# Patient Record
Sex: FEMALE | Race: OTHER | HISPANIC OR LATINO | URBAN - METROPOLITAN AREA
[De-identification: names, ages, dates, MRNs, and addresses within clinical notes are randomized per-mention and may not be internally consistent; named-entity substitution may affect disease eponyms.]

---

## 2017-07-19 ENCOUNTER — INPATIENT (INPATIENT)
Facility: HOSPITAL | Age: 77
LOS: 4 days | Discharge: HOME HEALTH | End: 2017-07-24
Attending: HOSPITALIST | Admitting: HOSPITALIST
Payer: MEDICAID

## 2017-07-19 VITALS
TEMPERATURE: 100 F | RESPIRATION RATE: 16 BRPM | DIASTOLIC BLOOD PRESSURE: 100 MMHG | OXYGEN SATURATION: 100 % | SYSTOLIC BLOOD PRESSURE: 192 MMHG | HEART RATE: 98 BPM

## 2017-07-19 LAB
ALBUMIN SERPL ELPH-MCNC: 3.7 G/DL — SIGNIFICANT CHANGE UP (ref 3.3–5)
ALP SERPL-CCNC: 64 U/L — SIGNIFICANT CHANGE UP (ref 40–120)
ALT FLD-CCNC: 14 U/L — SIGNIFICANT CHANGE UP (ref 4–33)
APPEARANCE UR: CLEAR — SIGNIFICANT CHANGE UP
AST SERPL-CCNC: 27 U/L — SIGNIFICANT CHANGE UP (ref 4–32)
BASOPHILS # BLD AUTO: 0.02 K/UL — SIGNIFICANT CHANGE UP (ref 0–0.2)
BASOPHILS NFR BLD AUTO: 0.2 % — SIGNIFICANT CHANGE UP (ref 0–2)
BILIRUB SERPL-MCNC: 0.2 MG/DL — SIGNIFICANT CHANGE UP (ref 0.2–1.2)
BILIRUB UR-MCNC: NEGATIVE — SIGNIFICANT CHANGE UP
BLOOD UR QL VISUAL: NEGATIVE — SIGNIFICANT CHANGE UP
BUN SERPL-MCNC: 44 MG/DL — HIGH (ref 7–23)
CALCIUM SERPL-MCNC: 8.4 MG/DL — SIGNIFICANT CHANGE UP (ref 8.4–10.5)
CHLORIDE SERPL-SCNC: 90 MMOL/L — LOW (ref 98–107)
CK SERPL-CCNC: 91 U/L — SIGNIFICANT CHANGE UP (ref 25–170)
CO2 SERPL-SCNC: 23 MMOL/L — SIGNIFICANT CHANGE UP (ref 22–31)
COLOR SPEC: SIGNIFICANT CHANGE UP
CREAT SERPL-MCNC: 1.52 MG/DL — HIGH (ref 0.5–1.3)
EOSINOPHIL # BLD AUTO: 0.18 K/UL — SIGNIFICANT CHANGE UP (ref 0–0.5)
EOSINOPHIL NFR BLD AUTO: 2.2 % — SIGNIFICANT CHANGE UP (ref 0–6)
GLUCOSE SERPL-MCNC: 191 MG/DL — HIGH (ref 70–99)
GLUCOSE UR-MCNC: 50 — SIGNIFICANT CHANGE UP
HCT VFR BLD CALC: 30.1 % — LOW (ref 34.5–45)
HGB BLD-MCNC: 10.2 G/DL — LOW (ref 11.5–15.5)
HYALINE CASTS # UR AUTO: SIGNIFICANT CHANGE UP (ref 0–?)
IMM GRANULOCYTES # BLD AUTO: 0.03 # — SIGNIFICANT CHANGE UP
IMM GRANULOCYTES NFR BLD AUTO: 0.4 % — SIGNIFICANT CHANGE UP (ref 0–1.5)
KETONES UR-MCNC: NEGATIVE — SIGNIFICANT CHANGE UP
LEUKOCYTE ESTERASE UR-ACNC: NEGATIVE — SIGNIFICANT CHANGE UP
LYMPHOCYTES # BLD AUTO: 2.18 K/UL — SIGNIFICANT CHANGE UP (ref 1–3.3)
LYMPHOCYTES # BLD AUTO: 26.9 % — SIGNIFICANT CHANGE UP (ref 13–44)
MCHC RBC-ENTMCNC: 29.3 PG — SIGNIFICANT CHANGE UP (ref 27–34)
MCHC RBC-ENTMCNC: 33.9 % — SIGNIFICANT CHANGE UP (ref 32–36)
MCV RBC AUTO: 86.5 FL — SIGNIFICANT CHANGE UP (ref 80–100)
MONOCYTES # BLD AUTO: 0.45 K/UL — SIGNIFICANT CHANGE UP (ref 0–0.9)
MONOCYTES NFR BLD AUTO: 5.6 % — SIGNIFICANT CHANGE UP (ref 2–14)
NEUTROPHILS # BLD AUTO: 5.23 K/UL — SIGNIFICANT CHANGE UP (ref 1.8–7.4)
NEUTROPHILS NFR BLD AUTO: 64.7 % — SIGNIFICANT CHANGE UP (ref 43–77)
NITRITE UR-MCNC: NEGATIVE — SIGNIFICANT CHANGE UP
NRBC # FLD: 0 — SIGNIFICANT CHANGE UP
PH UR: 7 — SIGNIFICANT CHANGE UP (ref 4.6–8)
PLATELET # BLD AUTO: 160 K/UL — SIGNIFICANT CHANGE UP (ref 150–400)
PMV BLD: 11.7 FL — SIGNIFICANT CHANGE UP (ref 7–13)
POTASSIUM SERPL-MCNC: 6.5 MMOL/L — CRITICAL HIGH (ref 3.5–5.3)
POTASSIUM SERPL-SCNC: 6.5 MMOL/L — CRITICAL HIGH (ref 3.5–5.3)
PROT SERPL-MCNC: 7.5 G/DL — SIGNIFICANT CHANGE UP (ref 6–8.3)
PROT UR-MCNC: 300 — HIGH
RBC # BLD: 3.48 M/UL — LOW (ref 3.8–5.2)
RBC # FLD: 14.3 % — SIGNIFICANT CHANGE UP (ref 10.3–14.5)
RBC CASTS # UR COMP ASSIST: SIGNIFICANT CHANGE UP (ref 0–?)
SODIUM SERPL-SCNC: 130 MMOL/L — LOW (ref 135–145)
SP GR SPEC: 1.01 — SIGNIFICANT CHANGE UP (ref 1–1.03)
SQUAMOUS # UR AUTO: SIGNIFICANT CHANGE UP
UROBILINOGEN FLD QL: NORMAL E.U. — SIGNIFICANT CHANGE UP (ref 0.1–0.2)
WBC # BLD: 8.09 K/UL — SIGNIFICANT CHANGE UP (ref 3.8–10.5)
WBC # FLD AUTO: 8.09 K/UL — SIGNIFICANT CHANGE UP (ref 3.8–10.5)
WBC UR QL: SIGNIFICANT CHANGE UP (ref 0–?)

## 2017-07-19 PROCEDURE — 71010: CPT | Mod: 26

## 2017-07-19 RX ORDER — HYDRALAZINE HCL 50 MG
10 TABLET ORAL ONCE
Qty: 0 | Refills: 0 | Status: COMPLETED | OUTPATIENT
Start: 2017-07-19 | End: 2017-07-19

## 2017-07-19 RX ORDER — CEFTRIAXONE 500 MG/1
1 INJECTION, POWDER, FOR SOLUTION INTRAMUSCULAR; INTRAVENOUS ONCE
Qty: 0 | Refills: 0 | Status: COMPLETED | OUTPATIENT
Start: 2017-07-19 | End: 2017-07-19

## 2017-07-19 RX ORDER — AZITHROMYCIN 500 MG/1
500 TABLET, FILM COATED ORAL ONCE
Qty: 0 | Refills: 0 | Status: COMPLETED | OUTPATIENT
Start: 2017-07-19 | End: 2017-07-19

## 2017-07-19 RX ADMIN — Medication 10 MILLIGRAM(S): at 23:31

## 2017-07-19 RX ADMIN — CEFTRIAXONE 100 GRAM(S): 500 INJECTION, POWDER, FOR SOLUTION INTRAMUSCULAR; INTRAVENOUS at 23:38

## 2017-07-19 NOTE — ED PROVIDER NOTE - MEDICAL DECISION MAKING DETAILS
Patient presenting with altered mental status, likely infectious (UTI vs PNA) vs Neurologic  - CBC, CMP, VBG, UA, CXR, cultures  - Reassess

## 2017-07-19 NOTE — ED PROVIDER NOTE - ATTENDING CONTRIBUTION TO CARE
ITALO DAVILA MD: 75 yo F with a past medical history of DM, Hypertension, hyperlipidemia and PNA several months ago presents with AMS and weakness since yesterday evening. Baseline the patient is A+OX2, but the patient is confused and seems to be hallucinating.  No recent h/o F/C, cough, SOB, N/V/D.  LOLA FLORES NOTE:  Patient is awake and alert and in no acute distress.  Doesn't follow commands.  Normocephalic/atraumatic.  Auricles are normal.  EOMI groassly. Neck supple.  Lungs CTAB, no wheeze, no rhonchi,  no rales.  Heart is regular rate and rhythm.  Abdomen is soft, not distended +BS.  Back is nontender, no CVAT.  DR. SANCHEZ, ATTENDING MD-  I performed a face to face bedside interview with patient regarding history of present illness, review of symptoms and past medical history. I completed an independent physical exam.  I have discussed patient's plan of care with Dr. Murdock.   I agree with note as stated above, having amended the EMR as needed to reflect my findings. I have discussed the assessment and plan of care.  This includes during the time I functioned as the attending physician for this patient.

## 2017-07-19 NOTE — ED ADULT NURSE NOTE - OBJECTIVE STATEMENT
Break coverage:  received pt in room 3 with family for altered mental status since this morning.  Pt A&Ox0 at present time, only responsive to vigorous tactile stimuli.  Family states pt is normally ambulatory with speaks normal; family now reports pt only mumbling and reaching for objects that are not there.  Pt febrile, no skin breakdown noted.  Septic workup completed as per MD request.  Family also reports pt had recent fall; unknown if there was head trauma. Pt awaiting full MD evaluation.

## 2017-07-19 NOTE — ED ADULT TRIAGE NOTE - CHIEF COMPLAINT QUOTE
Pt brought in with family for altered mental status since this morning. Per daughter pt has been mumbling since this morning and has been grabbing things off the floor that do not exist.

## 2017-07-19 NOTE — ED PROVIDER NOTE - OBJECTIVE STATEMENT
Offered  services, prefers family to translate.   76F h/o DM, HTN, HLD, PNA (3 months ago) p/w AMS and weakness since yesterday evening. Pt has been mumbling, has not been making sense. Has been grabbing towards the floor to  things that aren't there. Denies cough, fever, vomiting, diarrhea, falls. Baseline AAOx2.

## 2017-07-20 DIAGNOSIS — J18.9 PNEUMONIA, UNSPECIFIED ORGANISM: ICD-10-CM

## 2017-07-20 DIAGNOSIS — E11.9 TYPE 2 DIABETES MELLITUS WITHOUT COMPLICATIONS: ICD-10-CM

## 2017-07-20 DIAGNOSIS — E03.9 HYPOTHYROIDISM, UNSPECIFIED: ICD-10-CM

## 2017-07-20 DIAGNOSIS — N17.9 ACUTE KIDNEY FAILURE, UNSPECIFIED: ICD-10-CM

## 2017-07-20 DIAGNOSIS — Z29.9 ENCOUNTER FOR PROPHYLACTIC MEASURES, UNSPECIFIED: ICD-10-CM

## 2017-07-20 DIAGNOSIS — I10 ESSENTIAL (PRIMARY) HYPERTENSION: ICD-10-CM

## 2017-07-20 DIAGNOSIS — D64.9 ANEMIA, UNSPECIFIED: ICD-10-CM

## 2017-07-20 DIAGNOSIS — E78.5 HYPERLIPIDEMIA, UNSPECIFIED: ICD-10-CM

## 2017-07-20 DIAGNOSIS — R53.83 OTHER FATIGUE: ICD-10-CM

## 2017-07-20 LAB
ALBUMIN SERPL ELPH-MCNC: 3.4 G/DL — SIGNIFICANT CHANGE UP (ref 3.3–5)
ALBUMIN SERPL ELPH-MCNC: 3.6 G/DL — SIGNIFICANT CHANGE UP (ref 3.3–5)
ALBUMIN SERPL ELPH-MCNC: 3.8 G/DL — SIGNIFICANT CHANGE UP (ref 3.3–5)
ALP SERPL-CCNC: 55 U/L — SIGNIFICANT CHANGE UP (ref 40–120)
ALP SERPL-CCNC: 61 U/L — SIGNIFICANT CHANGE UP (ref 40–120)
ALP SERPL-CCNC: 66 U/L — SIGNIFICANT CHANGE UP (ref 40–120)
ALT FLD-CCNC: 10 U/L — SIGNIFICANT CHANGE UP (ref 4–33)
ALT FLD-CCNC: 11 U/L — SIGNIFICANT CHANGE UP (ref 4–33)
ALT FLD-CCNC: 23 U/L — SIGNIFICANT CHANGE UP (ref 4–33)
APAP SERPL-MCNC: < 15 UG/ML — LOW (ref 15–25)
APTT BLD: 22.5 SEC — LOW (ref 27.5–37.4)
AST SERPL-CCNC: 15 U/L — SIGNIFICANT CHANGE UP (ref 4–32)
AST SERPL-CCNC: 17 U/L — SIGNIFICANT CHANGE UP (ref 4–32)
AST SERPL-CCNC: 65 U/L — HIGH (ref 4–32)
BARBITURATES MEASUREMENT: NEGATIVE — SIGNIFICANT CHANGE UP
BASE EXCESS BLDV CALC-SCNC: 3.6 MMOL/L — SIGNIFICANT CHANGE UP
BASOPHILS # BLD AUTO: 0.04 K/UL — SIGNIFICANT CHANGE UP (ref 0–0.2)
BASOPHILS NFR BLD AUTO: 0.3 % — SIGNIFICANT CHANGE UP (ref 0–2)
BENZODIAZ SERPL-MCNC: NEGATIVE — SIGNIFICANT CHANGE UP
BILIRUB SERPL-MCNC: 0.2 MG/DL — SIGNIFICANT CHANGE UP (ref 0.2–1.2)
BLOOD GAS VENOUS - CREATININE: 1.44 MG/DL — HIGH (ref 0.5–1.3)
BUN SERPL-MCNC: 39 MG/DL — HIGH (ref 7–23)
BUN SERPL-MCNC: 41 MG/DL — HIGH (ref 7–23)
BUN SERPL-MCNC: 43 MG/DL — HIGH (ref 7–23)
CALCIUM SERPL-MCNC: 8 MG/DL — LOW (ref 8.4–10.5)
CALCIUM SERPL-MCNC: 8.1 MG/DL — LOW (ref 8.4–10.5)
CALCIUM SERPL-MCNC: 8.5 MG/DL — SIGNIFICANT CHANGE UP (ref 8.4–10.5)
CHLORIDE BLDV-SCNC: 95 MMOL/L — LOW (ref 96–108)
CHLORIDE SERPL-SCNC: 88 MMOL/L — LOW (ref 98–107)
CHLORIDE SERPL-SCNC: 90 MMOL/L — LOW (ref 98–107)
CHLORIDE SERPL-SCNC: 90 MMOL/L — LOW (ref 98–107)
CHOLEST SERPL-MCNC: 169 MG/DL — SIGNIFICANT CHANGE UP (ref 120–199)
CK SERPL-CCNC: 53 U/L — SIGNIFICANT CHANGE UP (ref 25–170)
CO2 SERPL-SCNC: 21 MMOL/L — LOW (ref 22–31)
CO2 SERPL-SCNC: 23 MMOL/L — SIGNIFICANT CHANGE UP (ref 22–31)
CO2 SERPL-SCNC: 25 MMOL/L — SIGNIFICANT CHANGE UP (ref 22–31)
CREAT SERPL-MCNC: 1.35 MG/DL — HIGH (ref 0.5–1.3)
CREAT SERPL-MCNC: 1.45 MG/DL — HIGH (ref 0.5–1.3)
CREAT SERPL-MCNC: 1.5 MG/DL — HIGH (ref 0.5–1.3)
EOSINOPHIL # BLD AUTO: 0.11 K/UL — SIGNIFICANT CHANGE UP (ref 0–0.5)
EOSINOPHIL NFR BLD AUTO: 0.8 % — SIGNIFICANT CHANGE UP (ref 0–6)
ETHANOL BLD-MCNC: < 10 MG/DL — SIGNIFICANT CHANGE UP
FERRITIN SERPL-MCNC: 489.5 NG/ML — HIGH (ref 15–150)
GAS PNL BLDV: 126 MMOL/L — LOW (ref 136–146)
GLUCOSE BLDV-MCNC: 231 — HIGH (ref 70–99)
GLUCOSE SERPL-MCNC: 189 MG/DL — HIGH (ref 70–99)
GLUCOSE SERPL-MCNC: 222 MG/DL — HIGH (ref 70–99)
GLUCOSE SERPL-MCNC: 279 MG/DL — HIGH (ref 70–99)
HAV IGM SER-ACNC: NONREACTIVE — SIGNIFICANT CHANGE UP
HBA1C BLD-MCNC: 7.9 % — HIGH (ref 4–5.6)
HBV CORE IGM SER-ACNC: NONREACTIVE — SIGNIFICANT CHANGE UP
HBV SURFACE AG SER-ACNC: NONREACTIVE — SIGNIFICANT CHANGE UP
HCO3 BLDV-SCNC: 27 MMOL/L — SIGNIFICANT CHANGE UP (ref 20–27)
HCT VFR BLD CALC: 31.5 % — LOW (ref 34.5–45)
HCT VFR BLDV CALC: 33.7 % — LOW (ref 34.5–45)
HCV AB S/CO SERPL IA: 0.16 S/CO — SIGNIFICANT CHANGE UP
HCV AB SERPL-IMP: SIGNIFICANT CHANGE UP
HDLC SERPL-MCNC: 47 MG/DL — SIGNIFICANT CHANGE UP (ref 45–65)
HGB BLD-MCNC: 10.4 G/DL — LOW (ref 11.5–15.5)
HGB BLDV-MCNC: 10.9 G/DL — LOW (ref 11.5–15.5)
HIV1 AG SER QL: SIGNIFICANT CHANGE UP
HIV1+2 AB SPEC QL: SIGNIFICANT CHANGE UP
IMM GRANULOCYTES # BLD AUTO: 0.06 # — SIGNIFICANT CHANGE UP
IMM GRANULOCYTES NFR BLD AUTO: 0.4 % — SIGNIFICANT CHANGE UP (ref 0–1.5)
INR BLD: 0.8 — LOW (ref 0.88–1.17)
IRON SATN MFR SERPL: 212 UG/DL — SIGNIFICANT CHANGE UP (ref 140–530)
IRON SATN MFR SERPL: 48 UG/DL — SIGNIFICANT CHANGE UP (ref 30–160)
LACTATE BLDV-MCNC: 1.1 MMOL/L — SIGNIFICANT CHANGE UP (ref 0.5–2)
LDH SERPL L TO P-CCNC: 200 U/L — SIGNIFICANT CHANGE UP (ref 135–225)
LIPID PNL WITH DIRECT LDL SERPL: 104 MG/DL — SIGNIFICANT CHANGE UP
LYMPHOCYTES # BLD AUTO: 23.4 % — SIGNIFICANT CHANGE UP (ref 13–44)
LYMPHOCYTES # BLD AUTO: 3.17 K/UL — SIGNIFICANT CHANGE UP (ref 1–3.3)
MAGNESIUM SERPL-MCNC: 1.7 MG/DL — SIGNIFICANT CHANGE UP (ref 1.6–2.6)
MCHC RBC-ENTMCNC: 27.7 PG — SIGNIFICANT CHANGE UP (ref 27–34)
MCHC RBC-ENTMCNC: 33 % — SIGNIFICANT CHANGE UP (ref 32–36)
MCV RBC AUTO: 83.8 FL — SIGNIFICANT CHANGE UP (ref 80–100)
MONOCYTES # BLD AUTO: 0.78 K/UL — SIGNIFICANT CHANGE UP (ref 0–0.9)
MONOCYTES NFR BLD AUTO: 5.8 % — SIGNIFICANT CHANGE UP (ref 2–14)
NEUTROPHILS # BLD AUTO: 9.39 K/UL — HIGH (ref 1.8–7.4)
NEUTROPHILS NFR BLD AUTO: 69.3 % — SIGNIFICANT CHANGE UP (ref 43–77)
NRBC # FLD: 0 — SIGNIFICANT CHANGE UP
PCO2 BLDV: 45 MMHG — SIGNIFICANT CHANGE UP (ref 41–51)
PH BLDV: 7.41 PH — SIGNIFICANT CHANGE UP (ref 7.32–7.43)
PHOSPHATE SERPL-MCNC: 3.8 MG/DL — SIGNIFICANT CHANGE UP (ref 2.5–4.5)
PLATELET # BLD AUTO: 281 K/UL — SIGNIFICANT CHANGE UP (ref 150–400)
PMV BLD: 11.4 FL — SIGNIFICANT CHANGE UP (ref 7–13)
PO2 BLDV: 50 MMHG — HIGH (ref 35–40)
POTASSIUM BLDV-SCNC: 4.7 MMOL/L — HIGH (ref 3.4–4.5)
POTASSIUM SERPL-MCNC: 5 MMOL/L — SIGNIFICANT CHANGE UP (ref 3.5–5.3)
POTASSIUM SERPL-MCNC: 5 MMOL/L — SIGNIFICANT CHANGE UP (ref 3.5–5.3)
POTASSIUM SERPL-MCNC: SIGNIFICANT CHANGE UP MMOL/L (ref 3.5–5.3)
POTASSIUM SERPL-SCNC: 5 MMOL/L — SIGNIFICANT CHANGE UP (ref 3.5–5.3)
POTASSIUM SERPL-SCNC: 5 MMOL/L — SIGNIFICANT CHANGE UP (ref 3.5–5.3)
POTASSIUM SERPL-SCNC: SIGNIFICANT CHANGE UP MMOL/L (ref 3.5–5.3)
PROT SERPL-MCNC: 6.6 G/DL — SIGNIFICANT CHANGE UP (ref 6–8.3)
PROT SERPL-MCNC: 7.2 G/DL — SIGNIFICANT CHANGE UP (ref 6–8.3)
PROT SERPL-MCNC: 8.1 G/DL — SIGNIFICANT CHANGE UP (ref 6–8.3)
PROTHROM AB SERPL-ACNC: 8.9 SEC — LOW (ref 9.8–13.1)
RBC # BLD: 3.76 M/UL — LOW (ref 3.8–5.2)
RBC # FLD: 14.1 % — SIGNIFICANT CHANGE UP (ref 10.3–14.5)
SALICYLATES SERPL-MCNC: < 5 MG/DL — LOW (ref 15–30)
SAO2 % BLDV: 80.8 % — SIGNIFICANT CHANGE UP (ref 60–85)
SODIUM SERPL-SCNC: 125 MMOL/L — LOW (ref 135–145)
SODIUM SERPL-SCNC: 128 MMOL/L — LOW (ref 135–145)
SODIUM SERPL-SCNC: 130 MMOL/L — LOW (ref 135–145)
SPECIMEN SOURCE: SIGNIFICANT CHANGE UP
SPECIMEN SOURCE: SIGNIFICANT CHANGE UP
T PALLIDUM AB TITR SER: NEGATIVE — SIGNIFICANT CHANGE UP
T4 FREE SERPL-MCNC: 1.28 NG/DL — SIGNIFICANT CHANGE UP (ref 0.9–1.8)
TRIGL SERPL-MCNC: 127 MG/DL — SIGNIFICANT CHANGE UP (ref 10–149)
TSH SERPL-MCNC: 2 UIU/ML — SIGNIFICANT CHANGE UP (ref 0.27–4.2)
UIBC SERPL-MCNC: 164 UG/DL — SIGNIFICANT CHANGE UP (ref 110–370)
WBC # BLD: 13.55 K/UL — HIGH (ref 3.8–10.5)
WBC # FLD AUTO: 13.55 K/UL — HIGH (ref 3.8–10.5)

## 2017-07-20 PROCEDURE — 70450 CT HEAD/BRAIN W/O DYE: CPT | Mod: 26

## 2017-07-20 PROCEDURE — 99223 1ST HOSP IP/OBS HIGH 75: CPT

## 2017-07-20 PROCEDURE — 71250 CT THORAX DX C-: CPT | Mod: 26

## 2017-07-20 PROCEDURE — 70551 MRI BRAIN STEM W/O DYE: CPT | Mod: 26

## 2017-07-20 PROCEDURE — 70547 MR ANGIOGRAPHY NECK W/O DYE: CPT | Mod: 26

## 2017-07-20 PROCEDURE — 74176 CT ABD & PELVIS W/O CONTRAST: CPT | Mod: 26

## 2017-07-20 RX ORDER — SODIUM CHLORIDE 9 MG/ML
1000 INJECTION INTRAMUSCULAR; INTRAVENOUS; SUBCUTANEOUS
Qty: 0 | Refills: 0 | Status: DISCONTINUED | OUTPATIENT
Start: 2017-07-20 | End: 2017-07-20

## 2017-07-20 RX ORDER — ASPIRIN/CALCIUM CARB/MAGNESIUM 324 MG
300 TABLET ORAL DAILY
Qty: 0 | Refills: 0 | Status: DISCONTINUED | OUTPATIENT
Start: 2017-07-20 | End: 2017-07-20

## 2017-07-20 RX ORDER — ASPIRIN/CALCIUM CARB/MAGNESIUM 324 MG
81 TABLET ORAL DAILY
Qty: 0 | Refills: 0 | Status: DISCONTINUED | OUTPATIENT
Start: 2017-07-20 | End: 2017-07-21

## 2017-07-20 RX ORDER — INSULIN LISPRO 100/ML
VIAL (ML) SUBCUTANEOUS
Qty: 0 | Refills: 0 | Status: DISCONTINUED | OUTPATIENT
Start: 2017-07-20 | End: 2017-07-24

## 2017-07-20 RX ORDER — GLUCAGON INJECTION, SOLUTION 0.5 MG/.1ML
1 INJECTION, SOLUTION SUBCUTANEOUS ONCE
Qty: 0 | Refills: 0 | Status: DISCONTINUED | OUTPATIENT
Start: 2017-07-20 | End: 2017-07-24

## 2017-07-20 RX ORDER — INSULIN LISPRO 100/ML
VIAL (ML) SUBCUTANEOUS AT BEDTIME
Qty: 0 | Refills: 0 | Status: DISCONTINUED | OUTPATIENT
Start: 2017-07-20 | End: 2017-07-24

## 2017-07-20 RX ORDER — DEXTROSE 50 % IN WATER 50 %
1 SYRINGE (ML) INTRAVENOUS ONCE
Qty: 0 | Refills: 0 | Status: DISCONTINUED | OUTPATIENT
Start: 2017-07-20 | End: 2017-07-24

## 2017-07-20 RX ORDER — HEPARIN SODIUM 5000 [USP'U]/ML
5000 INJECTION INTRAVENOUS; SUBCUTANEOUS EVERY 12 HOURS
Qty: 0 | Refills: 0 | Status: DISCONTINUED | OUTPATIENT
Start: 2017-07-20 | End: 2017-07-24

## 2017-07-20 RX ORDER — CEFTRIAXONE 500 MG/1
1 INJECTION, POWDER, FOR SOLUTION INTRAMUSCULAR; INTRAVENOUS EVERY 24 HOURS
Qty: 0 | Refills: 0 | Status: DISCONTINUED | OUTPATIENT
Start: 2017-07-20 | End: 2017-07-24

## 2017-07-20 RX ORDER — SODIUM CHLORIDE 9 MG/ML
1000 INJECTION, SOLUTION INTRAVENOUS
Qty: 0 | Refills: 0 | Status: DISCONTINUED | OUTPATIENT
Start: 2017-07-20 | End: 2017-07-20

## 2017-07-20 RX ORDER — SODIUM CHLORIDE 9 MG/ML
1000 INJECTION INTRAMUSCULAR; INTRAVENOUS; SUBCUTANEOUS
Qty: 0 | Refills: 0 | Status: DISCONTINUED | OUTPATIENT
Start: 2017-07-20 | End: 2017-07-24

## 2017-07-20 RX ORDER — DEXTROSE 50 % IN WATER 50 %
25 SYRINGE (ML) INTRAVENOUS ONCE
Qty: 0 | Refills: 0 | Status: DISCONTINUED | OUTPATIENT
Start: 2017-07-20 | End: 2017-07-24

## 2017-07-20 RX ORDER — ACETAMINOPHEN 500 MG
650 TABLET ORAL EVERY 8 HOURS
Qty: 0 | Refills: 0 | Status: DISCONTINUED | OUTPATIENT
Start: 2017-07-20 | End: 2017-07-24

## 2017-07-20 RX ORDER — ATORVASTATIN CALCIUM 80 MG/1
40 TABLET, FILM COATED ORAL AT BEDTIME
Qty: 0 | Refills: 0 | Status: DISCONTINUED | OUTPATIENT
Start: 2017-07-20 | End: 2017-07-22

## 2017-07-20 RX ORDER — SODIUM CHLORIDE 9 MG/ML
1000 INJECTION, SOLUTION INTRAVENOUS
Qty: 0 | Refills: 0 | Status: DISCONTINUED | OUTPATIENT
Start: 2017-07-20 | End: 2017-07-24

## 2017-07-20 RX ORDER — HEPARIN SODIUM 5000 [USP'U]/ML
5000 INJECTION INTRAVENOUS; SUBCUTANEOUS EVERY 12 HOURS
Qty: 0 | Refills: 0 | Status: DISCONTINUED | OUTPATIENT
Start: 2017-07-20 | End: 2017-07-20

## 2017-07-20 RX ORDER — DEXTROSE 50 % IN WATER 50 %
12.5 SYRINGE (ML) INTRAVENOUS ONCE
Qty: 0 | Refills: 0 | Status: DISCONTINUED | OUTPATIENT
Start: 2017-07-20 | End: 2017-07-24

## 2017-07-20 RX ORDER — AZITHROMYCIN 500 MG/1
500 TABLET, FILM COATED ORAL EVERY 24 HOURS
Qty: 0 | Refills: 0 | Status: DISCONTINUED | OUTPATIENT
Start: 2017-07-21 | End: 2017-07-24

## 2017-07-20 RX ORDER — INSULIN GLARGINE 100 [IU]/ML
8 INJECTION, SOLUTION SUBCUTANEOUS AT BEDTIME
Qty: 0 | Refills: 0 | Status: DISCONTINUED | OUTPATIENT
Start: 2017-07-20 | End: 2017-07-21

## 2017-07-20 RX ADMIN — AZITHROMYCIN 250 MILLIGRAM(S): 500 TABLET, FILM COATED ORAL at 00:14

## 2017-07-20 RX ADMIN — HEPARIN SODIUM 5000 UNIT(S): 5000 INJECTION INTRAVENOUS; SUBCUTANEOUS at 17:42

## 2017-07-20 RX ADMIN — SODIUM CHLORIDE 75 MILLILITER(S): 9 INJECTION INTRAMUSCULAR; INTRAVENOUS; SUBCUTANEOUS at 03:00

## 2017-07-20 RX ADMIN — INSULIN GLARGINE 8 UNIT(S): 100 INJECTION, SOLUTION SUBCUTANEOUS at 22:09

## 2017-07-20 RX ADMIN — CEFTRIAXONE 100 GRAM(S): 500 INJECTION, POWDER, FOR SOLUTION INTRAMUSCULAR; INTRAVENOUS at 22:09

## 2017-07-20 RX ADMIN — Medication 2: at 17:42

## 2017-07-20 RX ADMIN — Medication 300 MILLIGRAM(S): at 12:24

## 2017-07-20 RX ADMIN — Medication 1 MILLIGRAM(S): at 18:45

## 2017-07-20 RX ADMIN — AZITHROMYCIN 250 MILLIGRAM(S): 500 TABLET, FILM COATED ORAL at 23:31

## 2017-07-20 RX ADMIN — SODIUM CHLORIDE 75 MILLILITER(S): 9 INJECTION INTRAMUSCULAR; INTRAVENOUS; SUBCUTANEOUS at 10:19

## 2017-07-20 RX ADMIN — Medication 1: at 13:46

## 2017-07-20 RX ADMIN — HEPARIN SODIUM 5000 UNIT(S): 5000 INJECTION INTRAVENOUS; SUBCUTANEOUS at 06:17

## 2017-07-20 NOTE — H&P ADULT - HISTORY OF PRESENT ILLNESS
77 y/o female from Home , accompanied  with her 2 sons and Daughters in law in the ER tonight, HX of DM, HTN , High Cholesterol, CKD, Hypothyroid? Pneumonia 3 months ago , hospitalized in [facility] , + Anemia, pt was admitted as per family for Lethargy, Unsteady gait, confusion, trying to  objects from floor which do not exist, mumbling, slurred speech? Abdominal pain, Constipation, Drooling , Last BM 2 days ago ass per family, + N/V in the ER, no Fever, no rash, no cough , Neuro was called by me R/O CVA, + NIXON as per lab, S/P IV Zithromax and IV Ceftriaxone for possible LLL Pneumonia as per Chest X Ray ( Prelim ), CT head , CT Chest , CT A/P No Contrast were ordered tonight, NPO, IVF NS, Elevated BP in the /73, S/P IV Hydralazine 10 mg x 1 given in the ER tonight, pt was A+O x 2 up to 2 days ago as per family,     Labs: Na 130, K+ 5, BUN 41, Creatinine 1.52-----> 1.35, Glucose 222, CPK 91, UA : Glucose 50, Protein 300, WBC 8.09, Hgb 10.2, Platelet 160,     Vitals : Tem 100, HR 86, , /53, RR 16, 99% RA

## 2017-07-20 NOTE — SWALLOW BEDSIDE ASSESSMENT ADULT - ORAL PREPARATORY PHASE
Within functional limits no mastication, oral holding of solid piece in the anterior oral cavity; given verbal directive but does not chew solid therefore it was removed from the oral cavity

## 2017-07-20 NOTE — H&P ADULT - NSHPLABSRESULTS_GEN_ALL_CORE
ECG Ordered pending ECG : NSR @ 93, no acute ST , T changes noted     Chest X Ray: Prelim LLL Pneumonia?

## 2017-07-20 NOTE — PHYSICAL THERAPY INITIAL EVALUATION ADULT - PERTINENT HX OF CURRENT PROBLEM, REHAB EVAL
76 y.o. female admitted for c/o Lethargy, Unsteady gait, confusion, trying to  objects from floor which do not exist, mumbling, slurred speech? Abdominal pain, Constipation, Drooling .  Pt found to have PNA , neuro consulted.

## 2017-07-20 NOTE — H&P ADULT - PMH
DM (diabetes mellitus)    HLD (hyperlipidemia)    HTN (hypertension) Anemia    CKD (chronic kidney disease)    DM (diabetes mellitus)    HLD (hyperlipidemia)    HTN (hypertension)    Hypothyroid    Pneumonia

## 2017-07-20 NOTE — SWALLOW BEDSIDE ASSESSMENT ADULT - SWALLOW EVAL: DIAGNOSIS
Patient presents with functional oral and pharyngeal stage swallowing mechanism for puree and thin liquid trials characterized by adequate oral containment, adequate bolus manipulation and transport with adequate oral clearance. There is laryngeal elevation upon palpation, initiation of the pharyngeal swallow. There were no overt signs of impaired airway protection.   Of Note: Patient did not masticate solid trial despite dentures in place and encouragement to chew the solid; therefore solid piece was removed from the oral cavity; family also reports that patient tends to pocket solids.

## 2017-07-20 NOTE — CONSULT NOTE ADULT - SUBJECTIVE AND OBJECTIVE BOX
Neurology Consult    Name  BENTON RIGGINS    HPI: 77 yearold RH woman from [facility] w/ PMH of DM, dementia, HTN, HLD, PNA (3 months ago, treated in [facility]) p/w AMS and weakness since yesterday evening. Family reports that she has been altered since about March but today they noticed her having slurred speech and a left facial droop. She has also been trying to grab imaginary items from the floor. Family reports that at baseline she is usually AAOx2 (does not realize she is no longer in [facility]) but is conversational. Walks with a cane and assistance Lately has been veering toward the sides, was seen leaning to the left by daughter in law and to the right by daughter. Family also states that she has been complaining of headaches 2 weeks ago. Primary attending wants to r/o stroke. Family denies cough, fever, vomiting, diarrhea, falls.     NIHSS- 10, MRS-4        MEDICATIONS  (STANDING):  insulin lispro (HumaLOG) corrective regimen sliding scale   SubCutaneous three times a day before meals  insulin lispro (HumaLOG) corrective regimen sliding scale   SubCutaneous at bedtime  dextrose 5%. 1000 milliLiter(s) (50 mL/Hr) IV Continuous <Continuous>  dextrose 50% Injectable 12.5 Gram(s) IV Push once  dextrose 50% Injectable 25 Gram(s) IV Push once  dextrose 50% Injectable 25 Gram(s) IV Push once  azithromycin  IVPB 500 milliGRAM(s) IV Intermittent every 24 hours  cefTRIAXone   IVPB 1 Gram(s) IV Intermittent every 24 hours  sodium chloride 0.9%. 1000 milliLiter(s) (75 mL/Hr) IV Continuous <Continuous>    MEDICATIONS  (PRN):  dextrose Gel 1 Dose(s) Oral once PRN Blood Glucose LESS THAN 70 milliGRAM(s)/deciliter  glucagon  Injectable 1 milliGRAM(s) IntraMuscular once PRN Glucose LESS THAN 70 milligrams/deciliter      Allergies    No Known Allergies        Objective:   Vital Signs Last 24 Hrs  T(C): 38.2 (20 Jul 2017 01:37), Max: 38.2 (20 Jul 2017 01:37)  T(F): 100.8 (20 Jul 2017 01:37), Max: 100.8 (20 Jul 2017 01:37)  HR: 94 (20 Jul 2017 01:37) (78 - 98)  BP: 148/81 (20 Jul 2017 01:37) (148/81 - 228/73)  BP(mean): --  RR: 16 (20 Jul 2017 01:37) (15 - 16)  SpO2: 96% (20 Jul 2017 01:37) (96% - 100%)    General Exam:   General appearance: No acute distress                   Neurological Exam:  Mental Status: AAOx0, mute, does not follow any commands    Cranial Nerves: EOMI, PERRL, mild left facial (as per family) no dysarthria, tongue seems to be deviated to the right    Motor: moves all extremities spontaneously, L>R    Sensation: does not withdraw to painful stimulus on RUE and RLE. withdraws on LUE and LLE.    Coordination: deferred, patient not following commands    Labs:    07-20    130<L>  |  90<L>  |  41<H>  ----------------------------<  222<H>  5.0   |  25  |  1.35<H>    Ca    8.5      20 Jul 2017 00:10    TPro  7.2  /  Alb  3.6  /  TBili  0.2  /  DBili  x   /  AST  17  /  ALT  11  /  AlkPhos  66  07-20    LIVER FUNCTIONS - ( 20 Jul 2017 00:10 )  Alb: 3.6 g/dL / Pro: 7.2 g/dL / ALK PHOS: 66 u/L / ALT: 11 u/L / AST: 17 u/L / GGT: x           CBC Full  -  ( 19 Jul 2017 22:05 )  WBC Count : 8.09 K/uL  Hemoglobin : 10.2 g/dL  Hematocrit : 30.1 %  Platelet Count - Automated : 160 K/uL  Mean Cell Volume : 86.5 fL  Mean Cell Hemoglobin : 29.3 pg  Mean Cell Hemoglobin Concentration : 33.9 %  Auto Neutrophil # : 5.23 K/uL  Auto Lymphocyte # : 2.18 K/uL  Auto Monocyte # : 0.45 K/uL  Auto Eosinophil # : 0.18 K/uL  Auto Basophil # : 0.02 K/uL  Auto Neutrophil % : 64.7 %  Auto Lymphocyte % : 26.9 %  Auto Monocyte % : 5.6 %  Auto Eosinophil % : 2.2 %  Auto Basophil % : 0.2 % Neurology Consult    Name  BENTON RIGGINS    HPI: 77 yearold RH woman from [facility] w/ PMH of DM, dementia, HTN, HLD, PNA (3 months ago, treated in [facility] p/w AMS and weakness since yesterday evening. Family reports that she has been altered since about March but today they noticed her having slurred speech and a left facial droop. She has also been trying to grab imaginary items from the floor. Family reports that at baseline she is usually AAOx2 (does not realize she is no longer in [facility]) but is conversational. Walks with a cane and assistance Lately has been veering toward the sides, was seen leaning to the left by daughter in law and to the right by daughter. Family also states that she has been complaining of headaches 2 weeks ago. Family denies cough, fever, vomiting, diarrhea, falls.     NIHSS- 10, MRS-4        MEDICATIONS  (STANDING):  insulin lispro (HumaLOG) corrective regimen sliding scale   SubCutaneous three times a day before meals  insulin lispro (HumaLOG) corrective regimen sliding scale   SubCutaneous at bedtime  dextrose 5%. 1000 milliLiter(s) (50 mL/Hr) IV Continuous <Continuous>  dextrose 50% Injectable 12.5 Gram(s) IV Push once  dextrose 50% Injectable 25 Gram(s) IV Push once  dextrose 50% Injectable 25 Gram(s) IV Push once  azithromycin  IVPB 500 milliGRAM(s) IV Intermittent every 24 hours  cefTRIAXone   IVPB 1 Gram(s) IV Intermittent every 24 hours  sodium chloride 0.9%. 1000 milliLiter(s) (75 mL/Hr) IV Continuous <Continuous>    MEDICATIONS  (PRN):  dextrose Gel 1 Dose(s) Oral once PRN Blood Glucose LESS THAN 70 milliGRAM(s)/deciliter  glucagon  Injectable 1 milliGRAM(s) IntraMuscular once PRN Glucose LESS THAN 70 milligrams/deciliter      Allergies    No Known Allergies        Objective:   Vital Signs Last 24 Hrs  T(C): 38.2 (20 Jul 2017 01:37), Max: 38.2 (20 Jul 2017 01:37)  T(F): 100.8 (20 Jul 2017 01:37), Max: 100.8 (20 Jul 2017 01:37)  HR: 94 (20 Jul 2017 01:37) (78 - 98)  BP: 148/81 (20 Jul 2017 01:37) (148/81 - 228/73)  BP(mean): --  RR: 16 (20 Jul 2017 01:37) (15 - 16)  SpO2: 96% (20 Jul 2017 01:37) (96% - 100%)    General Exam:   General appearance: No acute distress                   Neurological Exam:  Mental Status: AAOx0, mute, does not follow any commands    Cranial Nerves: EOMI, PERRL, mild left facial (as per family) no dysarthria, tongue seems to be deviated to the right    Motor: moves all extremities spontaneously, L>R    Sensation: does not withdraw to painful stimulus on RUE and RLE. withdraws on LUE and LLE.    Coordination: deferred, patient not following commands    Labs:    07-20    130<L>  |  90<L>  |  41<H>  ----------------------------<  222<H>  5.0   |  25  |  1.35<H>    Ca    8.5      20 Jul 2017 00:10    TPro  7.2  /  Alb  3.6  /  TBili  0.2  /  DBili  x   /  AST  17  /  ALT  11  /  AlkPhos  66  07-20    LIVER FUNCTIONS - ( 20 Jul 2017 00:10 )  Alb: 3.6 g/dL / Pro: 7.2 g/dL / ALK PHOS: 66 u/L / ALT: 11 u/L / AST: 17 u/L / GGT: x           CBC Full  -  ( 19 Jul 2017 22:05 )  WBC Count : 8.09 K/uL  Hemoglobin : 10.2 g/dL  Hematocrit : 30.1 %  Platelet Count - Automated : 160 K/uL  Mean Cell Volume : 86.5 fL  Mean Cell Hemoglobin : 29.3 pg  Mean Cell Hemoglobin Concentration : 33.9 %  Auto Neutrophil # : 5.23 K/uL  Auto Lymphocyte # : 2.18 K/uL  Auto Monocyte # : 0.45 K/uL  Auto Eosinophil # : 0.18 K/uL  Auto Basophil # : 0.02 K/uL  Auto Neutrophil % : 64.7 %  Auto Lymphocyte % : 26.9 %  Auto Monocyte % : 5.6 %  Auto Eosinophil % : 2.2 %  Auto Basophil % : 0.2 %

## 2017-07-20 NOTE — H&P ADULT - ASSESSMENT
75 y/o female from Home , accompanied  with her 2 sons and Daughters in law in the ER tonight, HX of DM, HTN , High Cholesterol, CKD, Hypothyroid? Pneumonia 3 months ago , hospitalized in [facility] , + Anemia, pt was admitted as per family for Lethargy, Unsteady gait, confusion, trying to  objects from floor which do not exist, mumbling, slurred speech? Abdominal pain, Constipation, Drooling , Last BM 2 days ago ass per family, + N/V in the ER, no Fever, no rash, no cough , Neuro was called by me R/O CVA, + NIXON as per lab, S/P IV Zithromax and IV Ceftriaxone for possible LLL Pneumonia as per Chest X Ray ( Prelim ), CT head , CT Chest , CT A/P No Contrast were ordered tonight, NPO, IVF NS, Elevated BP in the /73, S/P IV Hydralazine 10 mg x 1 given in the ER tonight, pt was A+O x 2 up to 2 days ago as per family,

## 2017-07-20 NOTE — SWALLOW BEDSIDE ASSESSMENT ADULT - COMMENTS
Patient is a 77 y/o female from Home, HX of DM, HTN , High Cholesterol, CKD, Hypothyroid? Pneumonia 3 months ago , hospitalized in [facility] , + Anemia, pt was admitted as per family for Lethargy, Unsteady gait, confusion, trying to  objects from floor which do not exist, mumbling, slurred speech, r/o CVA, PNA.

## 2017-07-20 NOTE — H&P ADULT - PROBLEM SELECTOR PLAN 1
R/O CVA, Neuro consult was called, CT Head No Contrast, + NIXON, MRI Brain No contrast, MRA Head and Neck No Contrast, Fall/aspiration/seizure precaution, NPO ,   HOLD ASA/SQ Heparin for now, CT Head Pending, R/O Bleed , PT consult  Speech and swallow consult, CBC, CMP, HgbA1c, TSH, Free T4 , PT, PTT, INR, RPR, HIV Test, Tox Screen , Hep A,B,C  profile , Fasting Lipid, CPK , ECHO, ECG   IVF NS @ 75 cc/hr x 8 hours  CT A/P No contrast: R/O Obstruction , + constipation    ASA per  Rectal if NO Bleed , CT Head pending R/O CVA, Neuro consult was called, CT Head No Contrast, + NIXON, MRI Brain No contrast, MRA Head and Neck No Contrast, Fall/aspiration/seizure precaution, NPO ,   HOLD ASA/SQ Heparin for now, CT Head Pending, R/O Bleed , PT consult  Speech and swallow consult, CBC, CMP, HgbA1c, TSH, Free T4 , PT, PTT, INR, RPR, HIV Test, Tox Screen , Hep A,B,C  profile , Fasting Lipid, CPK , ECHO,   IVF NS @ 75 cc/hr x 8 hours  CT A/P No contrast: R/O Obstruction , + constipation    ASA per  Rectal if NO Bleed , CT Head pending  Enhanced Supervision

## 2017-07-20 NOTE — CONSULT NOTE ADULT - ATTENDING COMMENTS
History and  exam as above, with minor changes.  History. 76-year-old right-handed  lady first evaluated at [facility] on 7/20/17 with change in mental status. According to her family, in 3/17, in [facility], she developed a profound change in her mental status, including inability to generate speech, and was diagnosed with "diabetic coma" (perhaps hyperosmolar coma), with blood glucose of over 900. She was treated and improved, but never returned completely to baseline, with residual mild or mild-moderate cognitive deficits. On 7/19/17, she developed recurrent "confusion" followed by inability to generate speech, extremely similar to her previous episode, but apparently without documented hyperglycemia. When she was initially evaluated by our resident, she was mute and unable to follow commands, with the suggestion of a mild right hemiparesis. ROS otherwise negative. Exam. Alert and attentive; speech is fluent and prosodic; follows some but not all one step commands; no clear facial palsy; moves all extremities well versus gravity; remainder of neurologic exam nonfocal. CT head (7/19/17) to my eye showed moderate periventricular chronic ischemic changes and was otherwise unremarkable. Impression. In 3/17 she developed a change in mental status, ultimately with mutism and was diagnosed with probable hyperosmolar coma with blood glucose of over 900. Since that event, she improved, but with residual cognitive deficits. On 7/19/17 she developed recurrent, similar symptoms, with her initial exam suggesting global aphasia and mild right hemiparesis, but with subsequent examination showing only mildly impaired comprehension and otherwise a nonfocal exam. It is unclear whether she was actually globally aphasic consistent with left hemispheric dysfunction, possibly from left MCA infarction, or whether she had diffuse cerebral dysfunction with some type of encephalopathy, either due to as yet undiagnosed metabolic or infectious condition, or perhaps from a seizure. Plan. MRI brain/MRA neck and head; TTE; if an infarct is documented and MRA and TTE are unremarkable, then would obtain implantable loop recorder; if MRI is negative for infarction, obtain EEG; aspirin; rehabilitation.

## 2017-07-21 DIAGNOSIS — R47.01 APHASIA: ICD-10-CM

## 2017-07-21 DIAGNOSIS — G93.40 ENCEPHALOPATHY, UNSPECIFIED: ICD-10-CM

## 2017-07-21 LAB
BUN SERPL-MCNC: 30 MG/DL — HIGH (ref 7–23)
CALCIUM SERPL-MCNC: 8.1 MG/DL — LOW (ref 8.4–10.5)
CHLORIDE SERPL-SCNC: 101 MMOL/L — SIGNIFICANT CHANGE UP (ref 98–107)
CHLORIDE UR-SCNC: 26 MMOL/L — SIGNIFICANT CHANGE UP
CO2 SERPL-SCNC: 26 MMOL/L — SIGNIFICANT CHANGE UP (ref 22–31)
CREAT ?TM UR-MCNC: 23.71 MG/DL — SIGNIFICANT CHANGE UP
CREAT SERPL-MCNC: 1.45 MG/DL — HIGH (ref 0.5–1.3)
GLUCOSE SERPL-MCNC: 241 MG/DL — HIGH (ref 70–99)
HCT VFR BLD CALC: 31 % — LOW (ref 34.5–45)
HGB BLD-MCNC: 10.2 G/DL — LOW (ref 11.5–15.5)
MAGNESIUM SERPL-MCNC: 2 MG/DL — SIGNIFICANT CHANGE UP (ref 1.6–2.6)
MCHC RBC-ENTMCNC: 28.2 PG — SIGNIFICANT CHANGE UP (ref 27–34)
MCHC RBC-ENTMCNC: 32.9 % — SIGNIFICANT CHANGE UP (ref 32–36)
MCV RBC AUTO: 85.6 FL — SIGNIFICANT CHANGE UP (ref 80–100)
NRBC # FLD: 0 — SIGNIFICANT CHANGE UP
OSMOLALITY UR: 190 MOSMO/KG — SIGNIFICANT CHANGE UP (ref 50–1200)
PHOSPHATE SERPL-MCNC: 4.3 MG/DL — SIGNIFICANT CHANGE UP (ref 2.5–4.5)
PLATELET # BLD AUTO: 266 K/UL — SIGNIFICANT CHANGE UP (ref 150–400)
PMV BLD: 11.8 FL — SIGNIFICANT CHANGE UP (ref 7–13)
POTASSIUM SERPL-MCNC: 4.6 MMOL/L — SIGNIFICANT CHANGE UP (ref 3.5–5.3)
POTASSIUM SERPL-SCNC: 4.6 MMOL/L — SIGNIFICANT CHANGE UP (ref 3.5–5.3)
POTASSIUM UR-SCNC: 20.8 MEQ/L — SIGNIFICANT CHANGE UP
RBC # BLD: 3.62 M/UL — LOW (ref 3.8–5.2)
RBC # FLD: 14.4 % — SIGNIFICANT CHANGE UP (ref 10.3–14.5)
SODIUM SERPL-SCNC: 139 MMOL/L — SIGNIFICANT CHANGE UP (ref 135–145)
SODIUM UR-SCNC: 39 MEQ/L — SIGNIFICANT CHANGE UP
SPECIMEN SOURCE: SIGNIFICANT CHANGE UP
WBC # BLD: 10.34 K/UL — SIGNIFICANT CHANGE UP (ref 3.8–10.5)
WBC # FLD AUTO: 10.34 K/UL — SIGNIFICANT CHANGE UP (ref 3.8–10.5)

## 2017-07-21 PROCEDURE — 99233 SBSQ HOSP IP/OBS HIGH 50: CPT

## 2017-07-21 RX ORDER — INSULIN GLARGINE 100 [IU]/ML
10 INJECTION, SOLUTION SUBCUTANEOUS AT BEDTIME
Qty: 0 | Refills: 0 | Status: DISCONTINUED | OUTPATIENT
Start: 2017-07-21 | End: 2017-07-24

## 2017-07-21 RX ORDER — AMLODIPINE BESYLATE 2.5 MG/1
5 TABLET ORAL DAILY
Qty: 0 | Refills: 0 | Status: DISCONTINUED | OUTPATIENT
Start: 2017-07-21 | End: 2017-07-24

## 2017-07-21 RX ORDER — ASPIRIN/CALCIUM CARB/MAGNESIUM 324 MG
81 TABLET ORAL DAILY
Qty: 0 | Refills: 0 | Status: DISCONTINUED | OUTPATIENT
Start: 2017-07-21 | End: 2017-07-24

## 2017-07-21 RX ADMIN — Medication 2.5 MILLIGRAM(S): at 05:18

## 2017-07-21 RX ADMIN — Medication: at 08:30

## 2017-07-21 RX ADMIN — Medication 1: at 21:36

## 2017-07-21 RX ADMIN — HEPARIN SODIUM 5000 UNIT(S): 5000 INJECTION INTRAVENOUS; SUBCUTANEOUS at 18:19

## 2017-07-21 RX ADMIN — Medication 2: at 12:37

## 2017-07-21 RX ADMIN — INSULIN GLARGINE 10 UNIT(S): 100 INJECTION, SOLUTION SUBCUTANEOUS at 21:36

## 2017-07-21 RX ADMIN — Medication 1 MILLIGRAM(S): at 15:41

## 2017-07-21 RX ADMIN — AZITHROMYCIN 250 MILLIGRAM(S): 500 TABLET, FILM COATED ORAL at 23:03

## 2017-07-21 RX ADMIN — CEFTRIAXONE 100 GRAM(S): 500 INJECTION, POWDER, FOR SOLUTION INTRAMUSCULAR; INTRAVENOUS at 22:11

## 2017-07-21 RX ADMIN — Medication: at 18:19

## 2017-07-21 RX ADMIN — AMLODIPINE BESYLATE 5 MILLIGRAM(S): 2.5 TABLET ORAL at 15:41

## 2017-07-21 RX ADMIN — ATORVASTATIN CALCIUM 40 MILLIGRAM(S): 80 TABLET, FILM COATED ORAL at 21:25

## 2017-07-21 RX ADMIN — Medication 81 MILLIGRAM(S): at 15:41

## 2017-07-21 NOTE — PROGRESS NOTE ADULT - ASSESSMENT
75 yearold RH woman from [facility] w/ PMH of DM, dementia, HTN, HLD, PNA (3 months ago, treated in [facility] p/w AMS and weakness since the previous evening.    On initial physical exam, patient is not following any commands and does not speak, is not withdrawing from painful stimulation on RUE and RLE, however the next morning when visited by the stroke team, she seemed much more alert and interactive, a complete change in exam.   Patient had imaging which is concerning as there appears to be left M1 stenosis which would correlate to her initial symptoms of aphasia (left hemispheric dysfunction). For this reason, we would like to obtain higher quality imaging. Also, it is possible that a small infarct might be missed due to motion artifact.      Plan:  MRI brain w/o con  MRA Head and Neck w/o con (patient in [facility]ON)   rectal until passes dysphagia screening, NPO until then  atorvastatin 80  HBA1c, lipid panel  TTE w/ bubble study  Permissive HTN for 24 hours (<220/110)  neurochecks and vital signs Q4H  infectious work up as per primary team 75 yearold RH woman from [facility] w/ PMH of DM, dementia, HTN, HLD, PNA (3 months ago, treated in [facility]) p/w AMS and weakness since the previous evening.    On initial physical exam, patient is not following any commands and does not speak, is not withdrawing from painful stimulation on RUE and RLE, however the next morning when visited by the stroke team, she seemed much more alert and interactive, a complete change in exam.   Patient had imaging which is concerning as there appears to be left M1 stenosis which would correlate to her initial symptoms of aphasia (left hemispheric dysfunction). For this reason, we would like to obtain higher quality imaging. Also, it is possible that a small infarct might be missed due to motion artifact.  If Left MCA Stenosis Is Confirmed, then she should be treated as if she had symptomatic intracranial atherosclerosis, with aspirin plus Plavix for 3 months and high dose statin, etc. If repeat imaging shows no evidence of left MCA or left ICA stenosis, and no evidence of left hemispheric infarction, then her diagnosis is most likely encephalopathy due to systemic infection such as pneumonia, and her diagnosis would be non-neurovascular. In This Situation, just treat as appropriate from general medical standpoint.    Plan:   Repeat MRI brain w/o con  with adequate sedation  Repeat  MRA Head and Neck w/o con (patient in NIXON)   rectal until passes dysphagia screening, NPO until then  atorvastatin 80  HBA1c, lipid panel  TTE w/ bubble study  Permissive HTN for 24 hours (<220/110)  neurochecks and vital signs Q4H  infectious work up as per primary team

## 2017-07-21 NOTE — DISCHARGE NOTE ADULT - PATIENT PORTAL LINK FT
“You can access the Follow[employer] Patient Portal, offered by [facility], by registering with the following website: [email or web address]”

## 2017-07-21 NOTE — PROGRESS NOTE ADULT - PROBLEM SELECTOR PLAN 4
-unknown baseline likely CKD secondary to long standing DM  -consider restart ACE as outpt if Cr stable

## 2017-07-21 NOTE — DISCHARGE NOTE ADULT - CARE PROVIDER_API CALL
Your medical doctor or [facility] medical clinic,   Please call for appointment  Phone: [phone]  Fax: (   )    -

## 2017-07-21 NOTE — PROGRESS NOTE ADULT - SUBJECTIVE AND OBJECTIVE BOX
Patient is a 76y old  Female who presents with a chief complaint of R/O Pneumonia, confusion, R/O CVA, NIXON, Lethargic (2017 00:11)      SUBJECTIVE / OVERNIGHT EVENTS: Pt in NAD granddaughter at bedside states she a little more clearer sometimes a little confused.    MEDICATIONS  (STANDING):  insulin lispro (HumaLOG) corrective regimen sliding scale   SubCutaneous three times a day before meals  insulin lispro (HumaLOG) corrective regimen sliding scale   SubCutaneous at bedtime  dextrose 5%. 1000 milliLiter(s) (50 mL/Hr) IV Continuous <Continuous>  dextrose 50% Injectable 12.5 Gram(s) IV Push once  dextrose 50% Injectable 25 Gram(s) IV Push once  dextrose 50% Injectable 25 Gram(s) IV Push once  azithromycin  IVPB 500 milliGRAM(s) IV Intermittent every 24 hours  cefTRIAXone   IVPB 1 Gram(s) IV Intermittent every 24 hours  heparin  Injectable 5000 Unit(s) SubCutaneous every 12 hours  sodium chloride 0.9%. 1000 milliLiter(s) (75 mL/Hr) IV Continuous <Continuous>  aspirin enteric coated 81 milliGRAM(s) Oral daily  enalapril 2.5 milliGRAM(s) Oral daily  atorvastatin 40 milliGRAM(s) Oral at bedtime  insulin glargine Injectable (LANTUS) 8 Unit(s) SubCutaneous at bedtime    MEDICATIONS  (PRN):  dextrose Gel 1 Dose(s) Oral once PRN Blood Glucose LESS THAN 70 milliGRAM(s)/deciliter  glucagon  Injectable 1 milliGRAM(s) IntraMuscular once PRN Glucose LESS THAN 70 milligrams/deciliter  acetaminophen  Suppository 650 milliGRAM(s) Rectal every 8 hours PRN For Temp greater than 38 C (100.4 F)        CAPILLARY BLOOD GLUCOSE  199 (2017 07:28)  95 (2017 20:59)  231 (2017 17:25)  155 (2017 12:30)        I&O's Summary    2017 07:01  -  2017 07:00  --------------------------------------------------------  IN: 1410 mL / OUT: 1850 mL / NET: -440 mL        PHYSICAL EXAM:  GENERAL: NAD, well-developed  HEAD:  Atraumatic, Normocephalic  EYES: EOMI, PERRLA, conjunctiva and sclera clear  NECK: Supple, No JVD  CHEST/LUNG: +LLL crackles  HEART: Regular rate and rhythm; No murmurs, rubs, or gallops  ABDOMEN: Soft, Nontender, Nondistended; Bowel sounds present  EXTREMITIES:  2+ Peripheral Pulses, No clubbing, cyanosis, or edema  PSYCH: thinks she is in [facility]  NEUROLOGY: non-focal  SKIN: No rashes or lesions    LABS:                        10.2   10.34 )-----------( 266      ( 2017 04:50 )             31.0     07-21    139  |  101  |  30<H>  ----------------------------<  241<H>  4.6   |  26  |  1.45<H>    Ca    8.1<L>      2017 04:50  Phos  4.3     -  Mg     2.0     -    TPro  6.6  /  Alb  3.4  /  TBili  0.2  /  DBili  x   /  AST  15  /  ALT  10  /  AlkPhos  61  07-20    PT/INR - ( 2017 05:39 )   PT: 8.9 SEC;   INR: 0.80          PTT - ( 2017 05:39 )  PTT:22.5 SEC  CARDIAC MARKERS ( 2017 05:39 )  x     / x     / 53 u/L / x     / x      CARDIAC MARKERS ( 2017 22:05 )  x     / x     / 91 u/L / x     / x          Urinalysis Basic - ( 2017 22:25 )    Color: PLYEL / Appearance: CLEAR / S.009 / pH: 7.0  Gluc: 50 / Ketone: NEGATIVE  / Bili: NEGATIVE / Urobili: NORMAL E.U.   Blood: NEGATIVE / Protein: 300 / Nitrite: NEGATIVE   Leuk Esterase: NEGATIVE / RBC: 2-5 / WBC 0-2   Sq Epi: OCC / Non Sq Epi: x / Bacteria: x        RADIOLOGY & ADDITIONAL TESTS:    Imaging Personally Reviewed:< from: MRI Head w/o Cont (17 @ 20:15) >  No large territorial infarction.    Severely motion limited examination. Questionable moderate stenosis along   a long segment of the left M1 segment of the MCA versus artifact.   Diminished flow related signal is also seen involving the distal left   internal carotid artery. Better quality study is recommended for further   evaluation.    < end of copied text >      Consultant(s) Notes Reviewed:      Care Discussed with Consultants/Other Providers:neurology Patient is a 76y old  Female who presents with a chief complaint of R/O Pneumonia, confusion, R/O CVA, NIXON, Lethargic (2017 00:11)      SUBJECTIVE / OVERNIGHT EVENTS: Pt in NAD granddaughter at bedside states she a little more clearer sometimes a little confused.    MEDICATIONS  (STANDING):  insulin lispro (HumaLOG) corrective regimen sliding scale   SubCutaneous three times a day before meals  insulin lispro (HumaLOG) corrective regimen sliding scale   SubCutaneous at bedtime  dextrose 5%. 1000 milliLiter(s) (50 mL/Hr) IV Continuous <Continuous>  dextrose 50% Injectable 12.5 Gram(s) IV Push once  dextrose 50% Injectable 25 Gram(s) IV Push once  dextrose 50% Injectable 25 Gram(s) IV Push once  azithromycin  IVPB 500 milliGRAM(s) IV Intermittent every 24 hours  cefTRIAXone   IVPB 1 Gram(s) IV Intermittent every 24 hours  heparin  Injectable 5000 Unit(s) SubCutaneous every 12 hours  sodium chloride 0.9%. 1000 milliLiter(s) (75 mL/Hr) IV Continuous <Continuous>  aspirin enteric coated 81 milliGRAM(s) Oral daily  enalapril 2.5 milliGRAM(s) Oral daily  atorvastatin 40 milliGRAM(s) Oral at bedtime  insulin glargine Injectable (LANTUS) 8 Unit(s) SubCutaneous at bedtime    MEDICATIONS  (PRN):  dextrose Gel 1 Dose(s) Oral once PRN Blood Glucose LESS THAN 70 milliGRAM(s)/deciliter  glucagon  Injectable 1 milliGRAM(s) IntraMuscular once PRN Glucose LESS THAN 70 milligrams/deciliter  acetaminophen  Suppository 650 milliGRAM(s) Rectal every 8 hours PRN For Temp greater than 38 C (100.4 F)        CAPILLARY BLOOD GLUCOSE  199 (2017 07:28)  95 (2017 20:59)  231 (2017 17:25)  155 (2017 12:30)        I&O's Summary    2017 07:01  -  2017 07:00  --------------------------------------------------------  IN: 1410 mL / OUT: 1850 mL / NET: -440 mL    Vital Signs Last 24 Hrs  T(C): 37 (2017 12:09), Max: 37.1 (2017 20:44)  T(F): 98.6 (2017 12:09), Max: 98.7 (2017 20:44)  HR: 65 (2017 12:09) (65 - 88)  BP: 165/63 (2017 12:09) (118/55 - 165/63)  BP(mean): --  RR: 18 (2017 12:09) (17 - 18)  SpO2: 95% (2017 12:09) (95% - 100%)    PHYSICAL EXAM:  GENERAL: NAD, well-developed  HEAD:  Atraumatic, Normocephalic  EYES: EOMI, PERRLA, conjunctiva and sclera clear  NECK: Supple, No JVD  CHEST/LUNG: +LLL crackles  HEART: Regular rate and rhythm; No murmurs, rubs, or gallops  ABDOMEN: Soft, Nontender, Nondistended; Bowel sounds present  EXTREMITIES:  2+ Peripheral Pulses, No clubbing, cyanosis, or edema  PSYCH: thinks she is in [facility]  NEUROLOGY: non-focal  SKIN: No rashes or lesions    LABS:                        10.2   10.34 )-----------( 266      ( 2017 04:50 )             31.0     07-21    139  |  101  |  30<H>  ----------------------------<  241<H>  4.6   |  26  |  1.45<H>    Ca    8.1<L>      2017 04:50  Phos  4.3     07-21  Mg     2.0     07-21    TPro  6.6  /  Alb  3.4  /  TBili  0.2  /  DBili  x   /  AST  15  /  ALT  10  /  AlkPhos  61  07-20    PT/INR - ( 2017 05:39 )   PT: 8.9 SEC;   INR: 0.80          PTT - ( 2017 05:39 )  PTT:22.5 SEC  CARDIAC MARKERS ( 2017 05:39 )  x     / x     / 53 u/L / x     / x      CARDIAC MARKERS ( 2017 22:05 )  x     / x     / 91 u/L / x     / x          Urinalysis Basic - ( 2017 22:25 )    Color: PLYEL / Appearance: CLEAR / S.009 / pH: 7.0  Gluc: 50 / Ketone: NEGATIVE  / Bili: NEGATIVE / Urobili: NORMAL E.U.   Blood: NEGATIVE / Protein: 300 / Nitrite: NEGATIVE   Leuk Esterase: NEGATIVE / RBC: 2-5 / WBC 0-2   Sq Epi: OCC / Non Sq Epi: x / Bacteria: x        RADIOLOGY & ADDITIONAL TESTS:    Imaging Personally Reviewed:< from: MRI Head w/o Cont (17 @ 20:15) >  No large territorial infarction.    Severely motion limited examination. Questionable moderate stenosis along   a long segment of the left M1 segment of the MCA versus artifact.   Diminished flow related signal is also seen involving the distal left   internal carotid artery. Better quality study is recommended for further   evaluation.    < end of copied text >      Consultant(s) Notes Reviewed:      Care Discussed with Consultants/Other Providers:neurology

## 2017-07-21 NOTE — DISCHARGE NOTE ADULT - NS AS DC STROKE ED MATERIALS
Stroke Warning Signs and Symptoms/Call 911 for Stroke/Prescribed Medications/Risk Factors for Stroke/Stroke Education Booklet/Need for Followup After Discharge

## 2017-07-21 NOTE — DISCHARGE NOTE ADULT - MEDICATION SUMMARY - MEDICATIONS TO TAKE
I will START or STAY ON the medications listed below when I get home from the hospital:    aspirin 81 mg oral tablet, chewable  -- 1 tab(s) by mouth once a day  -- Indication: For Heart    Lantus 100 units/mL subcutaneous solution  -- 8 unit(s) subcutaneous once a day (at bedtime)  -- Indication: For DM (diabetes mellitus)    atorvastatin 80 mg oral tablet  -- 1 tab(s) by mouth once a day (at bedtime)  -- Indication: For HLD (hyperlipidemia)    Norvasc 5 mg oral tablet  -- 1 tab(s) by mouth once a day  -- Indication: For HTN (hypertension)    Ceftin 500 mg oral tablet  -- 1 tab(s) by mouth every 12 hours  -- Indication: For PNA (pneumonia) until 7/28/17    azithromycin 500 mg oral tablet  -- 1 tab(s) by mouth once a day  -- Indication: For PNA (pneumonia) until 7/28/17

## 2017-07-21 NOTE — PROGRESS NOTE ADULT - SUBJECTIVE AND OBJECTIVE BOX
Patient seen and examined. Indicates that she feels well (translated by daughter)        Initial HPI from admission: 75 yearold RH woman from [facility] w/ PMH of DM, dementia, HTN, HLD, PNA (3 months ago, treated in [facility]) p/w AMS and weakness since yesterday evening. Family reports that she has been altered since about March but today they noticed her having slurred speech and a left facial droop. She has also been trying to grab imaginary items from the floor. Family reports that at baseline she is usually AAOx2 (does not realize she is no longer in [facility]) but is conversational. Walks with a cane and assistance Lately has been veering toward the sides, was seen leaning to the left by daughter in law and to the right by daughter. Family also states that she has been complaining of headaches 2 weeks ago. Family denies cough, fever, vomiting, diarrhea, falls.     NIHSS- 10, MRS-4        MEDICATIONS  (STANDING):  insulin lispro (HumaLOG) corrective regimen sliding scale   SubCutaneous three times a day before meals  insulin lispro (HumaLOG) corrective regimen sliding scale   SubCutaneous at bedtime  dextrose 5%. 1000 milliLiter(s) (50 mL/Hr) IV Continuous <Continuous>  dextrose 50% Injectable 12.5 Gram(s) IV Push once  dextrose 50% Injectable 25 Gram(s) IV Push once  dextrose 50% Injectable 25 Gram(s) IV Push once  azithromycin  IVPB 500 milliGRAM(s) IV Intermittent every 24 hours  cefTRIAXone   IVPB 1 Gram(s) IV Intermittent every 24 hours  heparin  Injectable 5000 Unit(s) SubCutaneous every 12 hours  sodium chloride 0.9%. 1000 milliLiter(s) (75 mL/Hr) IV Continuous <Continuous>  atorvastatin 40 milliGRAM(s) Oral at bedtime  insulin glargine Injectable (LANTUS) 10 Unit(s) SubCutaneous at bedtime  amLODIPine   Tablet 5 milliGRAM(s) Oral daily  LORazepam     Tablet 1 milliGRAM(s) Oral once  aspirin  chewable 81 milliGRAM(s) Oral daily      Allergies    No Known Allergies        Vital Signs Last 24 Hrs  T(C): 37 (07-21-17 @ 12:09), Max: 37.1 (07-20-17 @ 20:44)  T(F): 98.6 (07-21-17 @ 12:09), Max: 98.7 (07-20-17 @ 20:44)  HR: 65 (07-21-17 @ 12:09) (65 - 88)  BP: 165/63 (07-21-17 @ 12:09) (118/55 - 165/63)  BP(mean): --  RR: 18 (07-21-17 @ 12:09) (17 - 18)  SpO2: 95% (07-21-17 @ 12:09) (95% - 100%)    General Exam:   General appearance: No acute distress                   Neurological Exam:  Mental Status: awake and alert, oriented to self only, follows simple commands such as making fist and showing 2 fingers    Cranial Nerves: EOMI, no dysarthria, no obvious facial droop, probably bilateral blink to threat    Motor: no drift in all 4 extremities, proximal strength intact      Labs:    07-20    130<L>  |  90<L>  |  41<H>  ----------------------------<  222<H>  5.0   |  25  |  1.35<H>    Ca    8.5      20 Jul 2017 00:10    TPro  7.2  /  Alb  3.6  /  TBili  0.2  /  DBili  x   /  AST  17  /  ALT  11  /  AlkPhos  66  07-20    LIVER FUNCTIONS - ( 20 Jul 2017 00:10 )  Alb: 3.6 g/dL / Pro: 7.2 g/dL / ALK PHOS: 66 u/L / ALT: 11 u/L / AST: 17 u/L / GGT: x           CBC Full  -  ( 19 Jul 2017 22:05 )  WBC Count : 8.09 K/uL  Hemoglobin : 10.2 g/dL  Hematocrit : 30.1 %  Platelet Count - Automated : 160 K/uL  Mean Cell Volume : 86.5 fL  Mean Cell Hemoglobin : 29.3 pg  Mean Cell Hemoglobin Concentration : 33.9 %  Auto Neutrophil # : 5.23 K/uL  Auto Lymphocyte # : 2.18 K/uL  Auto Monocyte # : 0.45 K/uL  Auto Eosinophil # : 0.18 K/uL  Auto Basophil # : 0.02 K/uL  Auto Neutrophil % : 64.7 %  Auto Lymphocyte % : 26.9 %  Auto Monocyte % : 5.6 %  Auto Eosinophil % : 2.2 %  Auto Basophil % : 0.2 %      IMaging: No large territorial infarction.    Severely motion limited examination. Questionable moderate stenosis along a  long segment of the left M1 segment of the MCA versus artifact. Diminished  flow related signal is also seen involving the distal left internal carotid  artery. Better quality study is recommended for further evaluation

## 2017-07-21 NOTE — DISCHARGE NOTE ADULT - PROVIDER TOKENS
FREE:[LAST:[Your medical doctor or [facility] medical clinic],PHONE:[phone]],FAX:[phone]   [phone]    -],ADDRESS:[Please call for appointment]]

## 2017-07-21 NOTE — PROGRESS NOTE ADULT - PROBLEM SELECTOR PLAN 1
-likely multifactorial CVA/TIA/Pneumonia/Delirium secondary to electrolyte abnormality  -ASA/statin  -case d/w with Neurology ?RT sided weakness as per family and possible LT MCA stenosis could potentially have had TIA/CVA will attempt MRI with ativan case d/w ADS NP/pharmacy ativan 1mg PO 1 hr prior to MRI if still agitated will give 0.5 mg PO prior to MRI.  -Tele Monitor   -S/S- puree with thin liquid  CT Chest no contrast-+LLL   ceftriaxone/Azithromycin  -Bcx neg till date  -monitor electrolytes

## 2017-07-21 NOTE — DISCHARGE NOTE ADULT - PLAN OF CARE
Continue 7 days of antibiotics. Follow up with your medical doctor within 1 week. Maintain adequate glycemic control. Monitor your sugars. Goal HgA1C < 7.0%. Low carb diet. Maintain adequate control of your blood pressure. Goal BP < 130/80. Continue low sodium diet. Follow up with PCP and/or cardiologist for ongoing medical management of your hypertension. Continue medications as prescribed. Low salt diet. Continue current meds. Low fat low cholesterol diet. Please follow up with your medical doctor within  1 week to recheck a kidney function level and resume your  Vasotec and Metformin as needed. Currently these medications are on hold. Complete antibiotics Maintain adequate glycemic control. Monitor your sugars. Goal HgA1C < 7.0%. Low carb diet.  Your metformin has been held on discharge as your kidney function was abnormal. Please repeat kidney function with your doctor to help determine if you need to continue Metformin

## 2017-07-21 NOTE — DISCHARGE NOTE ADULT - NS AS ACTIVITY OBS
Walking-Outdoors allowed/Do not make important decisions/Do not drive or operate machinery/Walking-Indoors allowed/No Heavy lifting/straining

## 2017-07-21 NOTE — DISCHARGE NOTE ADULT - CARE PLAN
Principal Discharge DX:	PNA (pneumonia)  Instructions for follow-up, activity and diet:	Continue 7 days of antibiotics. Follow up with your medical doctor within 1 week.  Secondary Diagnosis:	DM (diabetes mellitus)  Instructions for follow-up, activity and diet:	Maintain adequate glycemic control. Monitor your sugars. Goal HgA1C < 7.0%. Low carb diet.  Secondary Diagnosis:	HTN (hypertension)  Instructions for follow-up, activity and diet:	Maintain adequate control of your blood pressure. Goal BP < 130/80. Continue low sodium diet. Follow up with PCP and/or cardiologist for ongoing medical management of your hypertension. Continue medications as prescribed. Low salt diet.  Secondary Diagnosis:	HLD (hyperlipidemia)  Secondary Diagnosis:	Acute kidney injury Principal Discharge DX:	PNA (pneumonia)  Instructions for follow-up, activity and diet:	Continue 7 days of antibiotics. Follow up with your medical doctor within 1 week.  Secondary Diagnosis:	DM (diabetes mellitus)  Instructions for follow-up, activity and diet:	Maintain adequate glycemic control. Monitor your sugars. Goal HgA1C < 7.0%. Low carb diet.  Secondary Diagnosis:	HTN (hypertension)  Instructions for follow-up, activity and diet:	Maintain adequate control of your blood pressure. Goal BP < 130/80. Continue low sodium diet. Follow up with PCP and/or cardiologist for ongoing medical management of your hypertension. Continue medications as prescribed. Low salt diet.  Secondary Diagnosis:	HLD (hyperlipidemia)  Instructions for follow-up, activity and diet:	Continue current meds. Low fat low cholesterol diet.  Secondary Diagnosis:	Acute kidney injury  Instructions for follow-up, activity and diet:	Please follow up with your medical doctor within  1 week to recheck a kidney function level and resume your  Vasotec and Metformin as needed. Currently these medications are on hold. Principal Discharge DX:	PNA (pneumonia)  Goal:	Complete antibiotics  Instructions for follow-up, activity and diet:	Continue 7 days of antibiotics. Follow up with your medical doctor within 1 week.  Secondary Diagnosis:	DM (diabetes mellitus)  Instructions for follow-up, activity and diet:	Maintain adequate glycemic control. Monitor your sugars. Goal HgA1C < 7.0%. Low carb diet.  Your metformin has been held on discharge as your kidney function was abnormal. Please repeat kidney function with your doctor to help determine if you need to continue Metformin  Secondary Diagnosis:	HTN (hypertension)  Instructions for follow-up, activity and diet:	Maintain adequate control of your blood pressure. Goal BP < 130/80. Continue low sodium diet. Follow up with PCP and/or cardiologist for ongoing medical management of your hypertension. Continue medications as prescribed. Low salt diet.  Secondary Diagnosis:	HLD (hyperlipidemia)  Instructions for follow-up, activity and diet:	Continue current meds. Low fat low cholesterol diet.  Secondary Diagnosis:	Acute kidney injury  Instructions for follow-up, activity and diet:	Please follow up with your medical doctor within  1 week to recheck a kidney function level and resume your  Vasotec and Metformin as needed. Currently these medications are on hold.

## 2017-07-21 NOTE — PROGRESS NOTE ADULT - ASSESSMENT
77 y/o F w/ hx DM, HTN, CKD, ?dementia p/w lethargy and slurred speech and RT sided weakness. CT chest LLL PNA

## 2017-07-21 NOTE — DISCHARGE NOTE ADULT - HOSPITAL COURSE
75 y/o female from Home, HX of DM, HTN , High Cholesterol, CKD, Hypothyroid? Pneumonia 3 months ago , hospitalized in [facility] , + Anemia, pt was admitted as per family for Lethargy, Unsteady gait, confusion, trying to  objects from floor which do not exist, mumbling, slurred speech, r/o CVA, PNA    Lethargic.  AMS  - Neuro consulted  - CT Head - likely microvascular disease. There is no acute hemorrhage or midline shift..  - MRI Brain No contrast, MRA Head and Neck No Contrast:No large territorial infarction.  Severely motion limited examination. Questionable moderate stenosis along   a long segment of the left M1 segment of the MCA versus artifact.   Diminished flow related signal is also seen involving the distal left   internal carotid artery. Better quality study is recommended for further   evaluation.  -Repeat MRI/MRA of head and neck ----------------------------------------------  - Fall/aspiration/seizure precaution   - PT- home, no skilled PT needs  - Speech and swallow - Puree with Thin Liquids  - RPR neg, Tox Screen -neg, Hep A,B,C  profile: neg  HIV- neg   - CT A/P No contrast: R/O Obstruction , + constipation: neg     Pneumonia  - CT Chest w/o -Focal patchy consolidation within the left lower lobe and lingula with associated nodular opacities compatible with pneumonia.  - IV Zithromax, IV Ceftriaxone   - BCx: ntd;  UCx____     Anemia:  -Iron Studies- WNL, Vit B12, Folate.     Hypothyroid:  - TSH-2.0, Free T4-1.28    NIXON (acute kidney injury):  - IVF   -lisinopril switched to norvasc until baseline cr is confirmed     HTN (hypertension):  - HOLD PO Meds, NPO, IV Hydralazine PRN   - ECHO  - home meds held 2/2 NIXON    DM (diabetes mellitus)  - HgbA1c- 7.9%,   - FS, Sliding scale, NPO    -DVT Prophylaxis  -Venodyne for now    HLD (hyperlipidemia):  - Fasting Lipid - WNL 75 y/o female from Home, HX of DM, HTN , High Cholesterol, CKD, Hypothyroid? Pneumonia 3 months ago , hospitalized in [facility] , + Anemia, pt was admitted as per family for Lethargy, Unsteady gait, confusion, trying to  objects from floor which do not exist, mumbling, slurred speech, ADmitted to r/o CVA, PNA    Diagnostics:    - CT Head - likely microvascular disease. There is no acute hemorrhage or midline shift..  - MRI Brain No contrast, MRA Head and Neck No Contrast:No large territorial infarction.  Severely motion limited examination. Questionable moderate stenosis along   a long segment of the left M1 segment of the MCA versus artifact.   Diminished flow related signal is also seen involving the distal left   internal carotid artery. Better quality study is recommended for further   evaluation.  Repeat MRA -everely motion limited neck MRA examination.  No acute infarct or hemorrhage.  No significant stenosis of the M1 segment of the left MCA on this less   limited brain MRA study, when compared to artifact limiting brain MRA   study of 7/20/17.  Mild irregularity of the cervical segment of the left ICA, likely   representing mild to moderate stenosis secondary to atherosclerosis,   although these findings are limited secondary to motion artifact.  Otherwise, no significant vascular stenosis, occlusion, or aneurysm.   Intracranial and extracranial MR angiography is within normal limits by   NASCET criteria.  Moderate chronic microvascular ischemic disease.  RPR neg, Tox Screen -neg, Hep A,B,C  profile: neg  HIV- neg      PT- home, no skilled PT needs  - Speech and swallow - Puree with Thin Liquids    Pt also had CT chest revealing- Focal patchy consolidation within the left lower lobe and lingula with   associated nodular opacities compatible with pneumonia.    ID was consulted and abx started with Ceftriaxone and Zithromax- to finish 7 day course, will discharge with Ceftin and Azithromycin,    Pt also was constipated with unremarkable CT abd done.     Pt also noted to have some NIXON , improved on discharge, advised to have repeat Scr done with PMD within 1 week.  ARB and Metformin held on discharge.     Pt clinically improved and medically cleared for dc, 77 y/o female from Home, HX of DM, HTN , High Cholesterol, CKD, Hypothyroid? Pneumonia 3 months ago , hospitalized in [facility] , + Anemia, pt was admitted as per family for Lethargy, Unsteady gait, confusion, trying to  objects from floor which do not exist, mumbling, slurred speech, ADmitted to r/o CVA, PNA    Diagnostics:    - CT Head - likely microvascular disease. There is no acute hemorrhage or midline shift..  - MRI Brain No contrast, MRA Head and Neck No Contrast:No large territorial infarction.  Severely motion limited examination. Questionable moderate stenosis along   a long segment of the left M1 segment of the MCA versus artifact.   Diminished flow related signal is also seen involving the distal left   internal carotid artery. Better quality study is recommended for further   evaluation.  Repeat MRA -everely motion limited neck MRA examination.  No acute infarct or hemorrhage.  No significant stenosis of the M1 segment of the left MCA on this less   limited brain MRA study, when compared to artifact limiting brain MRA   study of 7/20/17.  Mild irregularity of the cervical segment of the left ICA, likely   representing mild to moderate stenosis secondary to atherosclerosis,   although these findings are limited secondary to motion artifact.  Otherwise, no significant vascular stenosis, occlusion, or aneurysm.   Intracranial and extracranial MR angiography is within normal limits by   NASCET criteria.  Moderate chronic microvascular ischemic disease.  RPR neg, Tox Screen -neg, Hep A,B,C  profile: neg  HIV- neg      PT- home, no skilled PT needs  - Speech and swallow - Puree with Thin Liquids    Pt also had CT chest revealing- Focal patchy consolidation within the left lower lobe and lingula with   associated nodular opacities compatible with pneumonia.    ID was consulted and abx started with Ceftriaxone and Zithromax- to finish 7 day course, will discharge with Ceftin and Azithromycin,    Pt also was constipated with unremarkable CT abd done.     Pt also noted to have some NIXON , improved on discharge however baseline was unknown, advised to have repeat Scr done with PMD within 1 week.  ARB held on discharge.     Pt clinically improved and medically cleared for dc,

## 2017-07-21 NOTE — PROGRESS NOTE ADULT - PROBLEM SELECTOR PLAN 1
Improved. Possible secondary to left hemispheric infarct. Repeat MRI and MRA with sedation (oral ativan or xanax) an hour before study is to be completed. Would recommend obtaining MRA with priority followed by MRI of lesser priority.  Continue ASA and DVT ppx.   No need for permissive HTN at this point.

## 2017-07-22 LAB
-  AMIKACIN: SIGNIFICANT CHANGE UP
-  AMPICILLIN/SULBACTAM: SIGNIFICANT CHANGE UP
-  AMPICILLIN: SIGNIFICANT CHANGE UP
-  AZTREONAM: SIGNIFICANT CHANGE UP
-  CEFAZOLIN: SIGNIFICANT CHANGE UP
-  CEFEPIME: SIGNIFICANT CHANGE UP
-  CEFOXITIN: SIGNIFICANT CHANGE UP
-  CEFTAZIDIME: SIGNIFICANT CHANGE UP
-  CEFTRIAXONE: SIGNIFICANT CHANGE UP
-  CIPROFLOXACIN: SIGNIFICANT CHANGE UP
-  ERTAPENEM: SIGNIFICANT CHANGE UP
-  GENTAMICIN: SIGNIFICANT CHANGE UP
-  IMIPENEM: SIGNIFICANT CHANGE UP
-  LEVOFLOXACIN: SIGNIFICANT CHANGE UP
-  MEROPENEM: SIGNIFICANT CHANGE UP
-  NITROFURANTOIN: SIGNIFICANT CHANGE UP
-  PIPERACILLIN/TAZOBACTAM: SIGNIFICANT CHANGE UP
-  TIGECYCLINE: SIGNIFICANT CHANGE UP
-  TOBRAMYCIN: SIGNIFICANT CHANGE UP
-  TRIMETHOPRIM/SULFAMETHOXAZOLE: SIGNIFICANT CHANGE UP
BACTERIA UR CULT: SIGNIFICANT CHANGE UP
BUN SERPL-MCNC: 32 MG/DL — HIGH (ref 7–23)
CALCIUM SERPL-MCNC: 8.1 MG/DL — LOW (ref 8.4–10.5)
CHLORIDE SERPL-SCNC: 100 MMOL/L — SIGNIFICANT CHANGE UP (ref 98–107)
CO2 SERPL-SCNC: 26 MMOL/L — SIGNIFICANT CHANGE UP (ref 22–31)
CREAT SERPL-MCNC: 1.37 MG/DL — HIGH (ref 0.5–1.3)
FOLATE SERPL-MCNC: 11.8 NG/ML — SIGNIFICANT CHANGE UP (ref 4.7–20)
GLUCOSE SERPL-MCNC: 193 MG/DL — HIGH (ref 70–99)
HCT VFR BLD CALC: 30 % — LOW (ref 34.5–45)
HGB BLD-MCNC: 10 G/DL — LOW (ref 11.5–15.5)
MCHC RBC-ENTMCNC: 28.9 PG — SIGNIFICANT CHANGE UP (ref 27–34)
MCHC RBC-ENTMCNC: 33.3 % — SIGNIFICANT CHANGE UP (ref 32–36)
MCV RBC AUTO: 86.7 FL — SIGNIFICANT CHANGE UP (ref 80–100)
METHOD TYPE: SIGNIFICANT CHANGE UP
NRBC # FLD: 0 — SIGNIFICANT CHANGE UP
ORGANISM # SPEC MICROSCOPIC CNT: SIGNIFICANT CHANGE UP
ORGANISM # SPEC MICROSCOPIC CNT: SIGNIFICANT CHANGE UP
PLATELET # BLD AUTO: 264 K/UL — SIGNIFICANT CHANGE UP (ref 150–400)
PMV BLD: 11 FL — SIGNIFICANT CHANGE UP (ref 7–13)
POTASSIUM SERPL-MCNC: 4.6 MMOL/L — SIGNIFICANT CHANGE UP (ref 3.5–5.3)
POTASSIUM SERPL-SCNC: 4.6 MMOL/L — SIGNIFICANT CHANGE UP (ref 3.5–5.3)
RBC # BLD: 3.46 M/UL — LOW (ref 3.8–5.2)
RBC # FLD: 14.3 % — SIGNIFICANT CHANGE UP (ref 10.3–14.5)
SODIUM SERPL-SCNC: 137 MMOL/L — SIGNIFICANT CHANGE UP (ref 135–145)
VIT B12 SERPL-MCNC: 660 PG/ML — SIGNIFICANT CHANGE UP (ref 200–900)
WBC # BLD: 8.83 K/UL — SIGNIFICANT CHANGE UP (ref 3.8–10.5)
WBC # FLD AUTO: 8.83 K/UL — SIGNIFICANT CHANGE UP (ref 3.8–10.5)

## 2017-07-22 PROCEDURE — 99233 SBSQ HOSP IP/OBS HIGH 50: CPT

## 2017-07-22 RX ORDER — ATORVASTATIN CALCIUM 80 MG/1
80 TABLET, FILM COATED ORAL AT BEDTIME
Qty: 0 | Refills: 0 | Status: DISCONTINUED | OUTPATIENT
Start: 2017-07-22 | End: 2017-07-24

## 2017-07-22 RX ADMIN — HEPARIN SODIUM 5000 UNIT(S): 5000 INJECTION INTRAVENOUS; SUBCUTANEOUS at 05:13

## 2017-07-22 RX ADMIN — AZITHROMYCIN 250 MILLIGRAM(S): 500 TABLET, FILM COATED ORAL at 23:58

## 2017-07-22 RX ADMIN — CEFTRIAXONE 100 GRAM(S): 500 INJECTION, POWDER, FOR SOLUTION INTRAMUSCULAR; INTRAVENOUS at 23:58

## 2017-07-22 RX ADMIN — Medication 81 MILLIGRAM(S): at 13:25

## 2017-07-22 RX ADMIN — Medication 1: at 18:36

## 2017-07-22 RX ADMIN — Medication 1: at 09:39

## 2017-07-22 RX ADMIN — AMLODIPINE BESYLATE 5 MILLIGRAM(S): 2.5 TABLET ORAL at 05:13

## 2017-07-22 RX ADMIN — Medication 2: at 13:25

## 2017-07-22 NOTE — PROVIDER CONTACT NOTE (OTHER) - ASSESSMENT
pt A&Ox1 at baseline. Temp 98.3. HR 67. /76. RR 17. 96% oxygen saturation on room air. pt is asymptomatic of hypertension

## 2017-07-22 NOTE — PROGRESS NOTE ADULT - PROBLEM SELECTOR PLAN 1
-likely multifactorial CVA/TIA/Pneumonia/Delirium secondary to electrolyte abnormality  -ASA/statin  -case d/w with Neurology ?RT sided weakness as per family (now resolved) and possible LT MCA stenosis could potentially have had TIA/CVA will attempt MRI with ativan case d/w ADS NP/pharmacy ativan 1mg PO 1 hr prior to MRI if still agitated will give 0.5 mg PO prior to MRI.  -Tele Monitor   -S/S- puree with thin liquid  CT Chest no contrast-+LLL   ceftriaxone/Azithromycin  -Bcx neg till date  -monitor electrolytes

## 2017-07-22 NOTE — PROGRESS NOTE ADULT - ASSESSMENT
75 y/o F w/ hx DM, HTN, CKD, ?dementia p/w lethargy and slurred speech and RT sided weakness. CT chest LLL PNA

## 2017-07-22 NOTE — CONSULT NOTE ADULT - SUBJECTIVE AND OBJECTIVE BOX
HPI:  77 y/o female with PMHx of DM, HTN , High Cholesterol, CKD, Hypothyroid? Pneumonia 3 months ago , hospitalized at [facility], presented with lethargy, unsteady gait, confusion, trying to  objects from floor which do not exist, mumbling, slurred speech, abdominal pain, constipation, drooling , had  + N/V in the ER, +low grade  fever, no rash, no cough.  CXR was suspicious for PNA. Pt was given IV Zithromax and IV Ceftriaxone for possible LLL Pneumonia.    Labs: Na 130, K+ 5, BUN 41, Creatinine 1.52-----> 1.35, Glucose 222, CPK 91, UA : Glucose 50, Protein 300, WBC 8.09, Hgb 10.2, Platelet 160,   Vitals : Tem 100, HR 86, , /53, RR 16, 99% RA (20 Jul 2017 00:11)  BC negative. Ct of the chest confirmed LLL PNA.  UC with ESBL E.coli in the absence of pyuria.   No dysuria, suprapubic or CVA tenderness.  Pt's mental status is better today.       PAST MEDICAL & SURGICAL HISTORY:  Pneumonia  Anemia  CKD (chronic kidney disease)  Hypothyroid  DM (diabetes mellitus)  HLD (hyperlipidemia)  HTN (hypertension)  No significant past surgical history    Family Hx: Non-contributory review     Social Hx: No smoking, no ETOH, no IVDU     Allergies    No Known Allergies      MEDICATIONS  (STANDING):  insulin lispro (HumaLOG) corrective regimen sliding scale   SubCutaneous three times a day before meals  insulin lispro (HumaLOG) corrective regimen sliding scale   SubCutaneous at bedtime  dextrose 5%. 1000 milliLiter(s) (50 mL/Hr) IV Continuous <Continuous>  dextrose 50% Injectable 12.5 Gram(s) IV Push once  dextrose 50% Injectable 25 Gram(s) IV Push once  dextrose 50% Injectable 25 Gram(s) IV Push once  azithromycin  IVPB 500 milliGRAM(s) IV Intermittent every 24 hours  cefTRIAXone   IVPB 1 Gram(s) IV Intermittent every 24 hours  heparin  Injectable 5000 Unit(s) SubCutaneous every 12 hours  sodium chloride 0.9%. 1000 milliLiter(s) (75 mL/Hr) IV Continuous <Continuous>  insulin glargine Injectable (LANTUS) 10 Unit(s) SubCutaneous at bedtime  amLODIPine   Tablet 5 milliGRAM(s) Oral daily  aspirin  chewable 81 milliGRAM(s) Oral daily  LORazepam     Tablet 1 milliGRAM(s) Oral once  atorvastatin 80 milliGRAM(s) Oral at bedtime    MEDICATIONS  (PRN):  dextrose Gel 1 Dose(s) Oral once PRN Blood Glucose LESS THAN 70 milliGRAM(s)/deciliter  glucagon  Injectable 1 milliGRAM(s) IntraMuscular once PRN Glucose LESS THAN 70 milligrams/deciliter  acetaminophen  Suppository 650 milliGRAM(s) Rectal every 8 hours PRN For Temp greater than 38 C (100.4 F)        LABS:    CBC Full  -  ( 22 Jul 2017 07:00 )  WBC Count : 8.83 K/uL  Hemoglobin : 10.0 g/dL  Hematocrit : 30.0 %  Platelet Count - Automated : 264 K/uL  Mean Cell Volume : 86.7 fL  Mean Cell Hemoglobin : 28.9 pg  Mean Cell Hemoglobin Concentration : 33.3 %  Auto Neutrophil # : x  Auto Lymphocyte # : x  Auto Monocyte # : x  Auto Eosinophil # : x  Auto Basophil # : x  Auto Neutrophil % : x  Auto Lymphocyte % : x  Auto Monocyte % : x  Auto Eosinophil % : x  Auto Basophil % : x    07-22    137  |  100  |  32<H>  ----------------------------<  193<H>  4.6   |  26  |  1.37<H>    Ca    8.1<L>      22 Jul 2017 07:00  Phos  4.3     07-21  Mg     2.0     07-21            Blood Gas Venous - Lactate: 1.1 mmol/L (07-20 @ 00:10      Cultures: BC negative, [place] [id number] ESBL E.coli.        Imaging Studies: CT chest: Focal patchy consolidation within the left lower lobe and lingula with   associated nodular opacities compatible with pneumonia.        Vital Signs:   Vital Signs Last 24 Hrs  T(C): 37.2 (07-22-17 @ 15:17), Max: 37.3 (07-22-17 @ 14:22)  T(F): 99 (07-22-17 @ 15:17), Max: 99.2 (07-22-17 @ 14:22)  HR: 80 (07-22-17 @ 15:17) (67 - 80)  BP: 127/59 (07-22-17 @ 15:17) (127/59 - 175/76)  BP(mean): --  RR: 18 (07-22-17 @ 15:17) (17 - 18)  SpO2: 99% (07-22-17 @ 15:17) (96% - 99%)

## 2017-07-22 NOTE — PROGRESS NOTE ADULT - SUBJECTIVE AND OBJECTIVE BOX
Patient is a 76y old  Female who presents with a chief complaint of R/O Pneumonia, confusion, R/O CVA, NIXON, Lethargic (21 Jul 2017 12:55)      SUBJECTIVE / OVERNIGHT EVENTS: Pt unable to get MRI yesterday was agitated and confused in PM. review of systems limited due to possible dementia    MEDICATIONS  (STANDING):  insulin lispro (HumaLOG) corrective regimen sliding scale   SubCutaneous three times a day before meals  insulin lispro (HumaLOG) corrective regimen sliding scale   SubCutaneous at bedtime  dextrose 5%. 1000 milliLiter(s) (50 mL/Hr) IV Continuous <Continuous>  dextrose 50% Injectable 12.5 Gram(s) IV Push once  dextrose 50% Injectable 25 Gram(s) IV Push once  dextrose 50% Injectable 25 Gram(s) IV Push once  azithromycin  IVPB 500 milliGRAM(s) IV Intermittent every 24 hours  cefTRIAXone   IVPB 1 Gram(s) IV Intermittent every 24 hours  heparin  Injectable 5000 Unit(s) SubCutaneous every 12 hours  sodium chloride 0.9%. 1000 milliLiter(s) (75 mL/Hr) IV Continuous <Continuous>  insulin glargine Injectable (LANTUS) 10 Unit(s) SubCutaneous at bedtime  amLODIPine   Tablet 5 milliGRAM(s) Oral daily  aspirin  chewable 81 milliGRAM(s) Oral daily  LORazepam     Tablet 1 milliGRAM(s) Oral once  atorvastatin 80 milliGRAM(s) Oral at bedtime    MEDICATIONS  (PRN):  dextrose Gel 1 Dose(s) Oral once PRN Blood Glucose LESS THAN 70 milliGRAM(s)/deciliter  glucagon  Injectable 1 milliGRAM(s) IntraMuscular once PRN Glucose LESS THAN 70 milligrams/deciliter  acetaminophen  Suppository 650 milliGRAM(s) Rectal every 8 hours PRN For Temp greater than 38 C (100.4 F)        CAPILLARY BLOOD GLUCOSE  232 (22 Jul 2017 13:02)  199 (22 Jul 2017 08:57)  265 (21 Jul 2017 21:35)  212 (21 Jul 2017 17:09)        I&O's Summary    21 Jul 2017 07:01  -  22 Jul 2017 07:00  --------------------------------------------------------  IN: 0 mL / OUT: 300 mL / NET: -300 mL        PHYSICAL EXAM:  GENERAL: NAD, well-developed  HEAD:  Atraumatic, Normocephalic  EYES: EOMI, PERRLA, conjunctiva and sclera clear  NECK: Supple, No JVD  CHEST/LUNG: Clear to auscultation bilaterally; No wheeze  HEART: Regular rate and rhythm; No murmurs, rubs, or gallops  ABDOMEN: Soft, Nontender, Nondistended; Bowel sounds present  EXTREMITIES:  2+ Peripheral Pulses, No clubbing, cyanosis, or edema  PSYCH: AAOx2 to herself and age   NEUROLOGY: non-focal  SKIN: No rashes or lesions    LABS:                        10.0   8.83  )-----------( 264      ( 22 Jul 2017 07:00 )             30.0     07-22    137  |  100  |  32<H>  ----------------------------<  193<H>  4.6   |  26  |  1.37<H>    Ca    8.1<L>      22 Jul 2017 07:00  Phos  4.3     07-21  Mg     2.0     07-21            UCx-Ecoli ESBL     RADIOLOGY & ADDITIONAL TESTS:    Imaging Personally Reviewed:    Consultant(s) Notes Reviewed:      Care Discussed with Consultants/Other Providers:

## 2017-07-22 NOTE — PROGRESS NOTE ADULT - SUBJECTIVE AND OBJECTIVE BOX
Patient is a 76y old  Female who presents with a chief complaint of R/O Pneumonia, confusion, R/O CVA, NIXON, Lethargic (21 Jul 2017 12:55)      SUBJECTIVE / OVERNIGHT EVENTS:     MEDICATIONS  (STANDING):  insulin lispro (HumaLOG) corrective regimen sliding scale   SubCutaneous three times a day before meals  insulin lispro (HumaLOG) corrective regimen sliding scale   SubCutaneous at bedtime  dextrose 5%. 1000 milliLiter(s) (50 mL/Hr) IV Continuous <Continuous>  dextrose 50% Injectable 12.5 Gram(s) IV Push once  dextrose 50% Injectable 25 Gram(s) IV Push once  dextrose 50% Injectable 25 Gram(s) IV Push once  azithromycin  IVPB 500 milliGRAM(s) IV Intermittent every 24 hours  cefTRIAXone   IVPB 1 Gram(s) IV Intermittent every 24 hours  heparin  Injectable 5000 Unit(s) SubCutaneous every 12 hours  sodium chloride 0.9%. 1000 milliLiter(s) (75 mL/Hr) IV Continuous <Continuous>  atorvastatin 40 milliGRAM(s) Oral at bedtime  insulin glargine Injectable (LANTUS) 10 Unit(s) SubCutaneous at bedtime  amLODIPine   Tablet 5 milliGRAM(s) Oral daily  aspirin  chewable 81 milliGRAM(s) Oral daily    MEDICATIONS  (PRN):  dextrose Gel 1 Dose(s) Oral once PRN Blood Glucose LESS THAN 70 milliGRAM(s)/deciliter  glucagon  Injectable 1 milliGRAM(s) IntraMuscular once PRN Glucose LESS THAN 70 milligrams/deciliter  acetaminophen  Suppository 650 milliGRAM(s) Rectal every 8 hours PRN For Temp greater than 38 C (100.4 F)        CAPILLARY BLOOD GLUCOSE  199 (22 Jul 2017 08:57)  265 (21 Jul 2017 21:35)  212 (21 Jul 2017 17:09)  219 (21 Jul 2017 12:09)        I&O's Summary    21 Jul 2017 07:01  -  22 Jul 2017 07:00  --------------------------------------------------------  IN: 0 mL / OUT: 300 mL / NET: -300 mL    Vital Signs Last 24 Hrs  T(C): 36.8 (22 Jul 2017 05:09), Max: 37 (21 Jul 2017 12:09)  T(F): 98.3 (22 Jul 2017 05:09), Max: 98.6 (21 Jul 2017 12:09)  HR: 72 (22 Jul 2017 05:09) (65 - 80)  BP: 154/66 (22 Jul 2017 05:09) (146/75 - 175/76)  BP(mean): --  RR: 18 (22 Jul 2017 05:09) (17 - 18)  SpO2: 97% (22 Jul 2017 05:09) (95% - 99%)    PHYSICAL EXAM:  GENERAL: NAD, well-developed  HEAD:  Atraumatic, Normocephalic  EYES: EOMI, PERRLA, conjunctiva and sclera clear  NECK: Supple, No JVD  CHEST/LUNG: +LLL crackles  HEART: Regular rate and rhythm; No murmurs, rubs, or gallops  ABDOMEN: Soft, Nontender, Nondistended; Bowel sounds present  EXTREMITIES:  2+ Peripheral Pulses, No clubbing, cyanosis, or edema  PSYCH: thinks she is in [facility]  NEUROLOGY: non-focal  SKIN: No rashes or lesions    LABS:                        10.0   8.83  )-----------( 264      ( 22 Jul 2017 07:00 )             30.0     07-22    137  |  100  |  32<H>  ----------------------------<  193<H>  4.6   |  26  |  1.37<H>    Ca    8.1<L>      22 Jul 2017 07:00  Phos  4.3     07-21  Mg     2.0     07-21                RADIOLOGY & ADDITIONAL TESTS:    Imaging Personally Reviewed:    Consultant(s) Notes Reviewed:      Care Discussed with Consultants/Other Providers: Patient is a 76y old  Female who presents with a chief complaint of R/O Pneumonia, confusion, R/O CVA, NIXON, Lethargic (21 Jul 2017 12:55)      SUBJECTIVE / OVERNIGHT EVENTS: Pt in NAD was unable to get MRI due to confusion Tele no events     MEDICATIONS  (STANDING):  insulin lispro (HumaLOG) corrective regimen sliding scale   SubCutaneous three times a day before meals  insulin lispro (HumaLOG) corrective regimen sliding scale   SubCutaneous at bedtime  dextrose 5%. 1000 milliLiter(s) (50 mL/Hr) IV Continuous <Continuous>  dextrose 50% Injectable 12.5 Gram(s) IV Push once  dextrose 50% Injectable 25 Gram(s) IV Push once  dextrose 50% Injectable 25 Gram(s) IV Push once  azithromycin  IVPB 500 milliGRAM(s) IV Intermittent every 24 hours  cefTRIAXone   IVPB 1 Gram(s) IV Intermittent every 24 hours  heparin  Injectable 5000 Unit(s) SubCutaneous every 12 hours  sodium chloride 0.9%. 1000 milliLiter(s) (75 mL/Hr) IV Continuous <Continuous>  atorvastatin 40 milliGRAM(s) Oral at bedtime  insulin glargine Injectable (LANTUS) 10 Unit(s) SubCutaneous at bedtime  amLODIPine   Tablet 5 milliGRAM(s) Oral daily  aspirin  chewable 81 milliGRAM(s) Oral daily    MEDICATIONS  (PRN):  dextrose Gel 1 Dose(s) Oral once PRN Blood Glucose LESS THAN 70 milliGRAM(s)/deciliter  glucagon  Injectable 1 milliGRAM(s) IntraMuscular once PRN Glucose LESS THAN 70 milligrams/deciliter  acetaminophen  Suppository 650 milliGRAM(s) Rectal every 8 hours PRN For Temp greater than 38 C (100.4 F)        CAPILLARY BLOOD GLUCOSE  199 (22 Jul 2017 08:57)  265 (21 Jul 2017 21:35)  212 (21 Jul 2017 17:09)  219 (21 Jul 2017 12:09)        I&O's Summary    21 Jul 2017 07:01  -  22 Jul 2017 07:00  --------------------------------------------------------  IN: 0 mL / OUT: 300 mL / NET: -300 mL    Vital Signs Last 24 Hrs  T(C): 36.8 (22 Jul 2017 05:09), Max: 37 (21 Jul 2017 12:09)  T(F): 98.3 (22 Jul 2017 05:09), Max: 98.6 (21 Jul 2017 12:09)  HR: 72 (22 Jul 2017 05:09) (65 - 80)  BP: 154/66 (22 Jul 2017 05:09) (146/75 - 175/76)  BP(mean): --  RR: 18 (22 Jul 2017 05:09) (17 - 18)  SpO2: 97% (22 Jul 2017 05:09) (95% - 99%)    PHYSICAL EXAM:  GENERAL: NAD, well-developed  HEAD:  Atraumatic, Normocephalic  EYES: EOMI, PERRLA, conjunctiva and sclera clear  NECK: Supple, No JVD  CHEST/LUNG: +LLL crackles  HEART: Regular rate and rhythm; No murmurs, rubs, or gallops  ABDOMEN: Soft, Nontender, Nondistended; Bowel sounds present  EXTREMITIES:  2+ Peripheral Pulses, No clubbing, cyanosis, or edema  PSYCH: thinks she is in [facility]  NEUROLOGY: non-focal  SKIN: No rashes or lesions    LABS:                        10.0   8.83  )-----------( 264      ( 22 Jul 2017 07:00 )             30.0     07-22    137  |  100  |  32<H>  ----------------------------<  193<H>  4.6   |  26  |  1.37<H>    Ca    8.1<L>      22 Jul 2017 07:00  Phos  4.3     07-21  Mg     2.0     07-21                RADIOLOGY & ADDITIONAL TESTS:    Imaging Personally Reviewed:    Consultant(s) Notes Reviewed:      Care Discussed with Consultants/Other Providers: Patient is a 76y old  Female who presents with a chief complaint of R/O Pneumonia, confusion, R/O CVA, NIXON, Lethargic (21 Jul 2017 12:55)      SUBJECTIVE / OVERNIGHT EVENTS: Pt in NAD was unable to get MRI due to agitation Tele no events     MEDICATIONS  (STANDING):  insulin lispro (HumaLOG) corrective regimen sliding scale   SubCutaneous three times a day before meals  insulin lispro (HumaLOG) corrective regimen sliding scale   SubCutaneous at bedtime  dextrose 5%. 1000 milliLiter(s) (50 mL/Hr) IV Continuous <Continuous>  dextrose 50% Injectable 12.5 Gram(s) IV Push once  dextrose 50% Injectable 25 Gram(s) IV Push once  dextrose 50% Injectable 25 Gram(s) IV Push once  azithromycin  IVPB 500 milliGRAM(s) IV Intermittent every 24 hours  cefTRIAXone   IVPB 1 Gram(s) IV Intermittent every 24 hours  heparin  Injectable 5000 Unit(s) SubCutaneous every 12 hours  sodium chloride 0.9%. 1000 milliLiter(s) (75 mL/Hr) IV Continuous <Continuous>  atorvastatin 40 milliGRAM(s) Oral at bedtime  insulin glargine Injectable (LANTUS) 10 Unit(s) SubCutaneous at bedtime  amLODIPine   Tablet 5 milliGRAM(s) Oral daily  aspirin  chewable 81 milliGRAM(s) Oral daily    MEDICATIONS  (PRN):  dextrose Gel 1 Dose(s) Oral once PRN Blood Glucose LESS THAN 70 milliGRAM(s)/deciliter  glucagon  Injectable 1 milliGRAM(s) IntraMuscular once PRN Glucose LESS THAN 70 milligrams/deciliter  acetaminophen  Suppository 650 milliGRAM(s) Rectal every 8 hours PRN For Temp greater than 38 C (100.4 F)        CAPILLARY BLOOD GLUCOSE  199 (22 Jul 2017 08:57)  265 (21 Jul 2017 21:35)  212 (21 Jul 2017 17:09)  219 (21 Jul 2017 12:09)        I&O's Summary    21 Jul 2017 07:01  -  22 Jul 2017 07:00  --------------------------------------------------------  IN: 0 mL / OUT: 300 mL / NET: -300 mL    Vital Signs Last 24 Hrs  T(C): 36.8 (22 Jul 2017 05:09), Max: 37 (21 Jul 2017 12:09)  T(F): 98.3 (22 Jul 2017 05:09), Max: 98.6 (21 Jul 2017 12:09)  HR: 72 (22 Jul 2017 05:09) (65 - 80)  BP: 154/66 (22 Jul 2017 05:09) (146/75 - 175/76)  BP(mean): --  RR: 18 (22 Jul 2017 05:09) (17 - 18)  SpO2: 97% (22 Jul 2017 05:09) (95% - 99%)    PHYSICAL EXAM:  GENERAL: NAD, well-developed  HEAD:  Atraumatic, Normocephalic  EYES: EOMI, PERRLA, conjunctiva and sclera clear  NECK: Supple, No JVD  CHEST/LUNG: +LLL crackles  HEART: Regular rate and rhythm; No murmurs, rubs, or gallops  ABDOMEN: Soft, Nontender, Nondistended; Bowel sounds present  EXTREMITIES:  2+ Peripheral Pulses, No clubbing, cyanosis, or edema  PSYCH: thinks she is in [facility]  NEUROLOGY: non-focal  SKIN: No rashes or lesions    LABS:                        10.0   8.83  )-----------( 264      ( 22 Jul 2017 07:00 )             30.0     07-22    137  |  100  |  32<H>  ----------------------------<  193<H>  4.6   |  26  |  1.37<H>    Ca    8.1<L>      22 Jul 2017 07:00  Phos  4.3     07-21  Mg     2.0     07-21                RADIOLOGY & ADDITIONAL TESTS:    Imaging Personally Reviewed:    Consultant(s) Notes Reviewed:      Care Discussed with Consultants/Other Providers:

## 2017-07-22 NOTE — CONSULT NOTE ADULT - ASSESSMENT
75 yearold RH woman from [facility] w/ PMH of DM, dementia, HTN, HLD, PNA (3 months ago, treated in [facility]) p/w AMS and weakness since yesterday evening. Family reports that she has been altered since about March but today they noticed her having slurred speech and a left facial droop.  On physical exam, patient is not following any commands and does not speak, is not withdrawing from painful stimulation on RUE and RLE. Family endorses that the left side of the face seems asymmetrical.  Impression: possibly altered mental state from infection (although no fever or white count, negative UA) or electrolyte/metabolic disturbance (Na-125-->130, BUN/Cr: 41/1/35). Stroke cannot be ruled out, possible L hemispheric infarct since patient is not withdrawing from painful stimuli on the right and is moving L>R.     Plan:  MRI brain w/o con  MRA Head and Neck w/o con (patient in [place])   rectal until passes dysphagia screening, NPO until then  atorvastatin 80  HBA1c, lipid panel  TTE w/ bubble study  Permissive HTN for 24 hours (<220/110)  neurochecks and vital signs Q4H  infectious work up as per primary team
75 y/o female with PMHx of DM, HTN , High Cholesterol, CKD, Hypothyroid? Pneumonia 3 months ago , hospitalized at [facility], presented with lethargy, unsteady gait, confusion, trying to  objects from floor which do not exist, mumbling, slurred speech, abdominal pain, constipation, drooling , had  + N/V in the ER, +low grade  fever, no rash, no cough.  CXR was suspicious for PNA. Pt was given IV Zithromax and IV Ceftriaxone for possible LLL Pneumonia.    Labs: Na 130, K+ 5, BUN 41, Creatinine 1.52-----> 1.35, Glucose 222, CPK 91, UA : Glucose 50, Protein 300, WBC 8.09, Hgb 10.2, Platelet 160,   Vitals : Tem 100, HR 86, , /53, RR 16, 99% RA (20 Jul 2017 00:11)  BC negative. Ct of the chest confirmed LLL PNA.  UC with ESBL E.coli in the absence of pyuria.   No dysuria, suprapubic or CVA tenderness.  Pt's mental status is better today.  Continue Tx with Zithromax and Ceftriaxone for CAP.  Doubt UTI. Would not Tx for + UC in the absence of pyuria.

## 2017-07-22 NOTE — PROGRESS NOTE ADULT - PROBLEM SELECTOR PLAN 1
-likely multifactorial CVA/TIA/Pneumonia/Delirium secondary to electrolyte abnormality  -ASA/statin  -case d/w with Neurology ?RT sided weakness as per family (now resolved) and possible LT MCA stenosis could potentially have had TIA/CVA will attempt MRI with ativan cased/w family at bedside and tele PA to arrange MRI/MRA while family in hospital.  -Tele Monitor   -S/S- puree with thin liquid  CT Chest no contrast-+LLL   ceftriaxone/Azithromycin day 4  -Bcx neg till date  -monitor electrolytes  -Ucx-Ecoli ESBL asymptomatic will consult ID for further recs hold carbapenems

## 2017-07-23 LAB
BUN SERPL-MCNC: 34 MG/DL — HIGH (ref 7–23)
CALCIUM SERPL-MCNC: 8.6 MG/DL — SIGNIFICANT CHANGE UP (ref 8.4–10.5)
CHLORIDE SERPL-SCNC: 99 MMOL/L — SIGNIFICANT CHANGE UP (ref 98–107)
CO2 SERPL-SCNC: 26 MMOL/L — SIGNIFICANT CHANGE UP (ref 22–31)
CREAT SERPL-MCNC: 1.29 MG/DL — SIGNIFICANT CHANGE UP (ref 0.5–1.3)
GLUCOSE SERPL-MCNC: 158 MG/DL — HIGH (ref 70–99)
HCT VFR BLD CALC: 33.7 % — LOW (ref 34.5–45)
HGB BLD-MCNC: 11.1 G/DL — LOW (ref 11.5–15.5)
MCHC RBC-ENTMCNC: 28.5 PG — SIGNIFICANT CHANGE UP (ref 27–34)
MCHC RBC-ENTMCNC: 32.9 % — SIGNIFICANT CHANGE UP (ref 32–36)
MCV RBC AUTO: 86.4 FL — SIGNIFICANT CHANGE UP (ref 80–100)
NRBC # FLD: 0 — SIGNIFICANT CHANGE UP
PLATELET # BLD AUTO: 303 K/UL — SIGNIFICANT CHANGE UP (ref 150–400)
PMV BLD: 11 FL — SIGNIFICANT CHANGE UP (ref 7–13)
POTASSIUM SERPL-MCNC: 4.4 MMOL/L — SIGNIFICANT CHANGE UP (ref 3.5–5.3)
POTASSIUM SERPL-SCNC: 4.4 MMOL/L — SIGNIFICANT CHANGE UP (ref 3.5–5.3)
RBC # BLD: 3.9 M/UL — SIGNIFICANT CHANGE UP (ref 3.8–5.2)
RBC # FLD: 14.3 % — SIGNIFICANT CHANGE UP (ref 10.3–14.5)
SODIUM SERPL-SCNC: 139 MMOL/L — SIGNIFICANT CHANGE UP (ref 135–145)
WBC # BLD: 8.48 K/UL — SIGNIFICANT CHANGE UP (ref 3.8–10.5)
WBC # FLD AUTO: 8.48 K/UL — SIGNIFICANT CHANGE UP (ref 3.8–10.5)

## 2017-07-23 PROCEDURE — 99233 SBSQ HOSP IP/OBS HIGH 50: CPT

## 2017-07-23 PROCEDURE — 70547 MR ANGIOGRAPHY NECK W/O DYE: CPT | Mod: 26

## 2017-07-23 PROCEDURE — 71010: CPT | Mod: 26

## 2017-07-23 PROCEDURE — 70551 MRI BRAIN STEM W/O DYE: CPT | Mod: 26

## 2017-07-23 RX ADMIN — HEPARIN SODIUM 5000 UNIT(S): 5000 INJECTION INTRAVENOUS; SUBCUTANEOUS at 06:06

## 2017-07-23 RX ADMIN — AZITHROMYCIN 250 MILLIGRAM(S): 500 TABLET, FILM COATED ORAL at 22:40

## 2017-07-23 RX ADMIN — INSULIN GLARGINE 10 UNIT(S): 100 INJECTION, SOLUTION SUBCUTANEOUS at 00:00

## 2017-07-23 RX ADMIN — Medication 1 MILLIGRAM(S): at 10:07

## 2017-07-23 RX ADMIN — Medication 81 MILLIGRAM(S): at 14:59

## 2017-07-23 RX ADMIN — AMLODIPINE BESYLATE 5 MILLIGRAM(S): 2.5 TABLET ORAL at 06:06

## 2017-07-23 RX ADMIN — CEFTRIAXONE 100 GRAM(S): 500 INJECTION, POWDER, FOR SOLUTION INTRAMUSCULAR; INTRAVENOUS at 22:40

## 2017-07-23 RX ADMIN — Medication 4: at 14:58

## 2017-07-23 RX ADMIN — INSULIN GLARGINE 10 UNIT(S): 100 INJECTION, SOLUTION SUBCUTANEOUS at 22:40

## 2017-07-23 RX ADMIN — Medication: at 18:03

## 2017-07-23 RX ADMIN — ATORVASTATIN CALCIUM 80 MILLIGRAM(S): 80 TABLET, FILM COATED ORAL at 22:39

## 2017-07-23 RX ADMIN — HEPARIN SODIUM 5000 UNIT(S): 5000 INJECTION INTRAVENOUS; SUBCUTANEOUS at 18:03

## 2017-07-23 RX ADMIN — Medication 1: at 22:41

## 2017-07-23 RX ADMIN — ATORVASTATIN CALCIUM 80 MILLIGRAM(S): 80 TABLET, FILM COATED ORAL at 00:00

## 2017-07-23 NOTE — PROGRESS NOTE ADULT - PROBLEM SELECTOR PLAN 4
-unknown baseline likely CKD secondary to long standing DM  -consider restart ACE as outpt if Cr stable -unknown baseline likely CKD secondary to long standing DM with overlying pre renal azotemia improved with IVF  -consider restart ACE as outpt if Cr stable

## 2017-07-23 NOTE — PROGRESS NOTE ADULT - PROBLEM SELECTOR PLAN 1
-likely multifactorial CVA/TIA/Pneumonia/Delirium secondary to electrolyte abnormality  -ASA/statin  -case d/w with Neurology ?RT sided weakness as per family (now resolved) and possible LT MCA stenosis could potentially have had TIA/CVA will attempt MRI with ativan cased/w family at bedside and tele PA to arrange MRI/MRA while family in hospital.  -Tele Monitor   -S/S- puree with thin liquid  CT Chest no contrast-+LLL   ceftriaxone/Azithromycin day 4  -Bcx neg till date  -monitor electrolytes  -Ucx-Ecoli ESBL asymptomatic will consult ID for further recs hold carbapenems -likely multifactorial Pneumonia/Delirium secondary to electrolyte abnormality/dementia  -ASA/statin  -repeat MRI/MRA no acute CVA  -Tele Monitor   -S/S- puree with thin liquid  CT Chest no contrast-+LLL   ceftriaxone/Azithromycin day 4  -Bcx neg till date  -monitor electrolytes  -Ucx-Ecoli ESBL asymptomatic will consult ID for further recs hold carbapenems -likely multifactorial Pneumonia/Delirium secondary to electrolyte abnormality/infection/dementia  -repeat MRI/MRA -no acute CVA/no focal LT MCA stenosis unlikely TIA/CVA  -Tele Monitor   -S/S- puree with thin liquid-family educated on diet   CT Chest no contrast-+LLL   -f/u repeat CXR  ceftriaxone/Azithromycin day 5  -Bcx neg till date  -monitor electrolytes  -Ucx-Ecoli ESBL asymptomatic ID recs hold carbapenems

## 2017-07-23 NOTE — PROGRESS NOTE ADULT - SUBJECTIVE AND OBJECTIVE BOX
Patient is a 76y old  Female who presents with a chief complaint of R/O Pneumonia, confusion, R/O CVA, NIX[place], Lethargic (21 Jul 2017 12:55)      SUBJECTIVE / OVERNIGHT EVENTS:    MEDICATIONS  (STANDING):  insulin lispro (HumaLOG) corrective regimen sliding scale   SubCutaneous three times a day before meals  insulin lispro (HumaLOG) corrective regimen sliding scale   SubCutaneous at bedtime  dextrose 5%. 1000 milliLiter(s) (50 mL/Hr) IV Continuous <Continuous>  dextrose 50% Injectable 12.5 Gram(s) IV Push once  dextrose 50% Injectable 25 Gram(s) IV Push once  dextrose 50% Injectable 25 Gram(s) IV Push once  azithromycin  IVPB 500 milliGRAM(s) IV Intermittent every 24 hours  cefTRIAXone   IVPB 1 Gram(s) IV Intermittent every 24 hours  heparin  Injectable 5000 Unit(s) SubCutaneous every 12 hours  sodium chloride 0.9%. 1000 milliLiter(s) (75 mL/Hr) IV Continuous <Continuous>  insulin glargine Injectable (LANTUS) 10 Unit(s) SubCutaneous at bedtime  amLODIPine   Tablet 5 milliGRAM(s) Oral daily  aspirin  chewable 81 milliGRAM(s) Oral daily  LORazepam     Tablet 1 milliGRAM(s) Oral once  atorvastatin 80 milliGRAM(s) Oral at bedtime    MEDICATIONS  (PRN):  dextrose Gel 1 Dose(s) Oral once PRN Blood Glucose LESS THAN 70 milliGRAM(s)/deciliter  glucagon  Injectable 1 milliGRAM(s) IntraMuscular once PRN Glucose LESS THAN 70 milligrams/deciliter  acetaminophen  Suppository 650 milliGRAM(s) Rectal every 8 hours PRN For Temp greater than 38 C (100.4 F)        CAPILLARY BLOOD GLUCOSE  157 (23 Jul 2017 08:41)  158 (22 Jul 2017 21:44)  174 (22 Jul 2017 17:00)  232 (22 Jul 2017 13:02)  199 (22 Jul 2017 08:57)        I&O's Summary    ICU Vital Signs Last 24 Hrs  T(C): 37.2 (23 Jul 2017 06:01), Max: 37.3 (22 Jul 2017 14:22)  T(F): 98.9 (23 Jul 2017 06:01), Max: 99.2 (22 Jul 2017 14:22)  HR: 80 (23 Jul 2017 06:01) (70 - 80)  BP: 155/77 (23 Jul 2017 06:01) (127/59 - 155/77)  BP(mean): --  ABP: --  ABP(mean): --  RR: 18 (23 Jul 2017 06:01) (18 - 18)  SpO2: 96% (23 Jul 2017 06:01) (96% - 99%)    PHYSICAL EXAM:  GENERAL: NAD, well-developed  HEAD:  Atraumatic, Normocephalic  EYES: EOMI, PERRLA, conjunctiva and sclera clear  NECK: Supple, No JVD  CHEST/LUNG: Clear to auscultation bilaterally; No wheeze  HEART: Regular rate and rhythm; No murmurs, rubs, or gallops  ABDOMEN: Soft, Nontender, Nondistended; Bowel sounds present  EXTREMITIES:  2+ Peripheral Pulses, No clubbing, cyanosis, or edema  PSYCH: AAOx3  NEUROLOGY: non-focal  SKIN: No rashes or lesions    LABS:                        11.1   8.48  )-----------( 303      ( 23 Jul 2017 06:38 )             33.7     07-23    139  |  99  |  34<H>  ----------------------------<  158<H>  4.4   |  26  |  1.29    Ca    8.6      23 Jul 2017 06:35                RADIOLOGY & ADDITIONAL TESTS:    Imaging Personally Reviewed:    Consultant(s) Notes Reviewed:      Care Discussed with Consultants/Other Providers: Patient is a 76y old  Female who presents with a chief complaint of R/O Pneumonia, confusion, R/O CVA, [place]IXON, Lethargic (21 Jul 2017 12:55)      SUBJECTIVE / OVERNIGHT EVENTS: Pt was fed by family and was choking on bread had Heimlich maneuver perform now o2 sats within normal limits afebrile     MEDICATIONS  (STANDING):  insulin lispro (HumaLOG) corrective regimen sliding scale   SubCutaneous three times a day before meals  insulin lispro (HumaLOG) corrective regimen sliding scale   SubCutaneous at bedtime  dextrose 5%. 1000 milliLiter(s) (50 mL/Hr) IV Continuous <Continuous>  dextrose 50% Injectable 12.5 Gram(s) IV Push once  dextrose 50% Injectable 25 Gram(s) IV Push once  dextrose 50% Injectable 25 Gram(s) IV Push once  azithromycin  IVPB 500 milliGRAM(s) IV Intermittent every 24 hours  cefTRIAXone   IVPB 1 Gram(s) IV Intermittent every 24 hours  heparin  Injectable 5000 Unit(s) SubCutaneous every 12 hours  sodium chloride 0.9%. 1000 milliLiter(s) (75 mL/Hr) IV Continuous <Continuous>  insulin glargine Injectable (LANTUS) 10 Unit(s) SubCutaneous at bedtime  amLODIPine   Tablet 5 milliGRAM(s) Oral daily  aspirin  chewable 81 milliGRAM(s) Oral daily  LORazepam     Tablet 1 milliGRAM(s) Oral once  atorvastatin 80 milliGRAM(s) Oral at bedtime    MEDICATIONS  (PRN):  dextrose Gel 1 Dose(s) Oral once PRN Blood Glucose LESS THAN 70 milliGRAM(s)/deciliter  glucagon  Injectable 1 milliGRAM(s) IntraMuscular once PRN Glucose LESS THAN 70 milligrams/deciliter  acetaminophen  Suppository 650 milliGRAM(s) Rectal every 8 hours PRN For Temp greater than 38 C (100.4 F)        CAPILLARY BLOOD GLUCOSE  157 (23 Jul 2017 08:41)  158 (22 Jul 2017 21:44)  174 (22 Jul 2017 17:00)  232 (22 Jul 2017 13:02)  199 (22 Jul 2017 08:57)        I&O's Summary    ICU Vital Signs Last 24 Hrs  T(C): 37.2 (23 Jul 2017 06:01), Max: 37.3 (22 Jul 2017 14:22)  T(F): 98.9 (23 Jul 2017 06:01), Max: 99.2 (22 Jul 2017 14:22)  HR: 80 (23 Jul 2017 06:01) (70 - 80)  BP: 155/77 (23 Jul 2017 06:01) (127/59 - 155/77)  BP(mean): --  ABP: --  ABP(mean): --  RR: 18 (23 Jul 2017 06:01) (18 - 18)  SpO2: 96% (23 Jul 2017 06:01) (96% - 99%)    PHYSICAL EXAM:  GENERAL: NAD, well-developed  HEAD:  Atraumatic, Normocephalic  EYES: EOMI, PERRLA, conjunctiva and sclera clear  NECK: Supple, No JVD  CHEST/LUNG: Clear to auscultation bilaterally; No wheeze  HEART: Regular rate and rhythm; No murmurs, rubs, or gallops  ABDOMEN: Soft, Nontender, Nondistended; Bowel sounds present  EXTREMITIES:  2+ Peripheral Pulses, No clubbing, cyanosis, or edema  PSYCH: AAOx2  NEUROLOGY: non-focal  SKIN: No rashes or lesions    LABS:                        11.1   8.48  )-----------( 303      ( 23 Jul 2017 06:38 )             33.7     07-23    139  |  99  |  34<H>  ----------------------------<  158<H>  4.4   |  26  |  1.29    Ca    8.6      23 Jul 2017 06:35                RADIOLOGY & ADDITIONAL TESTS:    Imaging Personally Reviewed:< from: MRI Head w/o Cont (07.23.17 @ 12:08) >  Severely motion limited neck MRA examination.    No acute infarct or hemorrhage.    No significant stenosis of the M1 segment of the left MCA on this less   limited brain MRA study, when compared to artifact limiting brain MRA   study of 7/20/17.    Mild irregularity of the cervical segment of the left ICA, likely   representing mild to moderate stenosis secondary to atherosclerosis,   although these findings are limited secondary to motion artifact.    Otherwise, no significant vascular stenosis, occlusion, or aneurysm.   Intracranial and extracranial MR angiography is within normal limits by   NASCET criteria.    Moderate chronic microvascular ischemic disease.    < end of copied text >      Consultant(s) Notes Reviewed:      Care Discussed with Consultants/Other Providers: Patient is a 76y old  Female who presents with a chief complaint of R/O Pneumonia, confusion, R/O CVA, [place]IXON, Lethargic (21 Jul 2017 12:55)      SUBJECTIVE / OVERNIGHT EVENTS: Pt was fed by family and was choking on bread had Heimlich maneuver perform now o2 sats within normal limits afebrile. Tele SR    MEDICATIONS  (STANDING):  insulin lispro (HumaLOG) corrective regimen sliding scale   SubCutaneous three times a day before meals  insulin lispro (HumaLOG) corrective regimen sliding scale   SubCutaneous at bedtime  dextrose 5%. 1000 milliLiter(s) (50 mL/Hr) IV Continuous <Continuous>  dextrose 50% Injectable 12.5 Gram(s) IV Push once  dextrose 50% Injectable 25 Gram(s) IV Push once  dextrose 50% Injectable 25 Gram(s) IV Push once  azithromycin  IVPB 500 milliGRAM(s) IV Intermittent every 24 hours  cefTRIAXone   IVPB 1 Gram(s) IV Intermittent every 24 hours  heparin  Injectable 5000 Unit(s) SubCutaneous every 12 hours  sodium chloride 0.9%. 1000 milliLiter(s) (75 mL/Hr) IV Continuous <Continuous>  insulin glargine Injectable (LANTUS) 10 Unit(s) SubCutaneous at bedtime  amLODIPine   Tablet 5 milliGRAM(s) Oral daily  aspirin  chewable 81 milliGRAM(s) Oral daily  LORazepam     Tablet 1 milliGRAM(s) Oral once  atorvastatin 80 milliGRAM(s) Oral at bedtime    MEDICATIONS  (PRN):  dextrose Gel 1 Dose(s) Oral once PRN Blood Glucose LESS THAN 70 milliGRAM(s)/deciliter  glucagon  Injectable 1 milliGRAM(s) IntraMuscular once PRN Glucose LESS THAN 70 milligrams/deciliter  acetaminophen  Suppository 650 milliGRAM(s) Rectal every 8 hours PRN For Temp greater than 38 C (100.4 F)        CAPILLARY BLOOD GLUCOSE  157 (23 Jul 2017 08:41)  158 (22 Jul 2017 21:44)  174 (22 Jul 2017 17:00)  232 (22 Jul 2017 13:02)  199 (22 Jul 2017 08:57)        I&O's Summary    ICU Vital Signs Last 24 Hrs  T(C): 37.2 (23 Jul 2017 06:01), Max: 37.3 (22 Jul 2017 14:22)  T(F): 98.9 (23 Jul 2017 06:01), Max: 99.2 (22 Jul 2017 14:22)  HR: 80 (23 Jul 2017 06:01) (70 - 80)  BP: 155/77 (23 Jul 2017 06:01) (127/59 - 155/77)  BP(mean): --  ABP: --  ABP(mean): --  RR: 18 (23 Jul 2017 06:01) (18 - 18)  SpO2: 96% (23 Jul 2017 06:01) (96% - 99%)    PHYSICAL EXAM:  GENERAL: NAD, well-developed  HEAD:  Atraumatic, Normocephalic  EYES: EOMI, PERRLA, conjunctiva and sclera clear  NECK: Supple, No JVD  CHEST/LUNG: Clear to auscultation bilaterally; No wheeze  HEART: Regular rate and rhythm; No murmurs, rubs, or gallops  ABDOMEN: Soft, Nontender, Nondistended; Bowel sounds present  EXTREMITIES:  2+ Peripheral Pulses, No clubbing, cyanosis, or edema  PSYCH: aao to self  NEUROLOGY: non-focal  SKIN: No rashes or lesions    LABS:                        11.1   8.48  )-----------( 303      ( 23 Jul 2017 06:38 )             33.7     07-23    139  |  99  |  34<H>  ----------------------------<  158<H>  4.4   |  26  |  1.29    Ca    8.6      23 Jul 2017 06:35                RADIOLOGY & ADDITIONAL TESTS:    Imaging Personally Reviewed:< from: MRI Head w/o Cont (07.23.17 @ 12:08) >  Severely motion limited neck MRA examination.    No acute infarct or hemorrhage.    No significant stenosis of the M1 segment of the left MCA on this less   limited brain MRA study, when compared to artifact limiting brain MRA   study of 7/20/17.    Mild irregularity of the cervical segment of the left ICA, likely   representing mild to moderate stenosis secondary to atherosclerosis,   although these findings are limited secondary to motion artifact.    Otherwise, no significant vascular stenosis, occlusion, or aneurysm.   Intracranial and extracranial MR angiography is within normal limits by   NASCET criteria.    Moderate chronic microvascular ischemic disease.    < end of copied text >      Consultant(s) Notes Reviewed:      Care Discussed with Consultants/Other Providers:

## 2017-07-24 VITALS
SYSTOLIC BLOOD PRESSURE: 155 MMHG | DIASTOLIC BLOOD PRESSURE: 72 MMHG | TEMPERATURE: 98 F | RESPIRATION RATE: 18 BRPM | HEART RATE: 69 BPM | OXYGEN SATURATION: 98 %

## 2017-07-24 DIAGNOSIS — J69.0 PNEUMONITIS DUE TO INHALATION OF FOOD AND VOMIT: ICD-10-CM

## 2017-07-24 LAB
BACTERIA BLD CULT: SIGNIFICANT CHANGE UP
BACTERIA BLD CULT: SIGNIFICANT CHANGE UP
BUN SERPL-MCNC: 31 MG/DL — HIGH (ref 7–23)
CALCIUM SERPL-MCNC: 8 MG/DL — LOW (ref 8.4–10.5)
CHLORIDE SERPL-SCNC: 102 MMOL/L — SIGNIFICANT CHANGE UP (ref 98–107)
CO2 SERPL-SCNC: 24 MMOL/L — SIGNIFICANT CHANGE UP (ref 22–31)
CREAT SERPL-MCNC: 1.31 MG/DL — HIGH (ref 0.5–1.3)
GLUCOSE SERPL-MCNC: 178 MG/DL — HIGH (ref 70–99)
HCT VFR BLD CALC: 30.1 % — LOW (ref 34.5–45)
HGB BLD-MCNC: 10.3 G/DL — LOW (ref 11.5–15.5)
MAGNESIUM SERPL-MCNC: 2.2 MG/DL — SIGNIFICANT CHANGE UP (ref 1.6–2.6)
MCHC RBC-ENTMCNC: 29.6 PG — SIGNIFICANT CHANGE UP (ref 27–34)
MCHC RBC-ENTMCNC: 34.2 % — SIGNIFICANT CHANGE UP (ref 32–36)
MCV RBC AUTO: 86.5 FL — SIGNIFICANT CHANGE UP (ref 80–100)
NRBC # FLD: 0 — SIGNIFICANT CHANGE UP
PLATELET # BLD AUTO: 282 K/UL — SIGNIFICANT CHANGE UP (ref 150–400)
PMV BLD: 11 FL — SIGNIFICANT CHANGE UP (ref 7–13)
POTASSIUM SERPL-MCNC: 4.6 MMOL/L — SIGNIFICANT CHANGE UP (ref 3.5–5.3)
POTASSIUM SERPL-SCNC: 4.6 MMOL/L — SIGNIFICANT CHANGE UP (ref 3.5–5.3)
RBC # BLD: 3.48 M/UL — LOW (ref 3.8–5.2)
RBC # FLD: 14 % — SIGNIFICANT CHANGE UP (ref 10.3–14.5)
SODIUM SERPL-SCNC: 139 MMOL/L — SIGNIFICANT CHANGE UP (ref 135–145)
WBC # BLD: 9.26 K/UL — SIGNIFICANT CHANGE UP (ref 3.8–10.5)
WBC # FLD AUTO: 9.26 K/UL — SIGNIFICANT CHANGE UP (ref 3.8–10.5)

## 2017-07-24 PROCEDURE — 99239 HOSP IP/OBS DSCHRG MGMT >30: CPT

## 2017-07-24 RX ORDER — ATORVASTATIN CALCIUM 80 MG/1
1 TABLET, FILM COATED ORAL
Qty: 0 | Refills: 0 | COMMUNITY
Start: 2017-07-24

## 2017-07-24 RX ORDER — METFORMIN HYDROCHLORIDE 850 MG/1
1 TABLET ORAL
Qty: 0 | Refills: 0 | COMMUNITY

## 2017-07-24 RX ORDER — ASPIRIN/CALCIUM CARB/MAGNESIUM 324 MG
1 TABLET ORAL
Qty: 0 | Refills: 0 | COMMUNITY
Start: 2017-07-24

## 2017-07-24 RX ORDER — AMLODIPINE BESYLATE 2.5 MG/1
1 TABLET ORAL
Qty: 0 | Refills: 0 | COMMUNITY
Start: 2017-07-24

## 2017-07-24 RX ORDER — ATORVASTATIN CALCIUM 80 MG/1
1 TABLET, FILM COATED ORAL
Qty: 0 | Refills: 0 | COMMUNITY

## 2017-07-24 RX ORDER — AZITHROMYCIN 500 MG/1
1 TABLET, FILM COATED ORAL
Qty: 4 | Refills: 0 | OUTPATIENT
Start: 2017-07-24 | End: 2017-07-28

## 2017-07-24 RX ORDER — CEFOXITIN 1 G/1
1 INJECTION, POWDER, FOR SOLUTION INTRAVENOUS
Qty: 8 | Refills: 0 | OUTPATIENT
Start: 2017-07-24 | End: 2017-07-28

## 2017-07-24 RX ADMIN — Medication 1: at 08:51

## 2017-07-24 RX ADMIN — Medication 81 MILLIGRAM(S): at 12:48

## 2017-07-24 RX ADMIN — AMLODIPINE BESYLATE 5 MILLIGRAM(S): 2.5 TABLET ORAL at 05:48

## 2017-07-24 RX ADMIN — Medication: at 12:47

## 2017-07-24 RX ADMIN — HEPARIN SODIUM 5000 UNIT(S): 5000 INJECTION INTRAVENOUS; SUBCUTANEOUS at 05:48

## 2017-07-24 NOTE — PROGRESS NOTE ADULT - SUBJECTIVE AND OBJECTIVE BOX
Patient is a 76y old  Female who presents with a chief complaint of R/O Pneumonia, confusion, R/O CVA, NIXON, Lethargic (21 Jul 2017 12:55)      SUBJECTIVE / OVERNIGHT EVENTS: Interview with pt and  via . Pt without complaints, pt and  reports pt appears to be back at baseline.     MEDICATIONS  (STANDING):  insulin lispro (HumaLOG) corrective regimen sliding scale   SubCutaneous three times a day before meals  insulin lispro (HumaLOG) corrective regimen sliding scale   SubCutaneous at bedtime  dextrose 5%. 1000 milliLiter(s) (50 mL/Hr) IV Continuous <Continuous>  dextrose 50% Injectable 12.5 Gram(s) IV Push once  dextrose 50% Injectable 25 Gram(s) IV Push once  dextrose 50% Injectable 25 Gram(s) IV Push once  azithromycin  IVPB 500 milliGRAM(s) IV Intermittent every 24 hours  cefTRIAXone   IVPB 1 Gram(s) IV Intermittent every 24 hours  heparin  Injectable 5000 Unit(s) SubCutaneous every 12 hours  sodium chloride 0.9%. 1000 milliLiter(s) (75 mL/Hr) IV Continuous <Continuous>  insulin glargine Injectable (LANTUS) 10 Unit(s) SubCutaneous at bedtime  amLODIPine   Tablet 5 milliGRAM(s) Oral daily  aspirin  chewable 81 milliGRAM(s) Oral daily  atorvastatin 80 milliGRAM(s) Oral at bedtime    MEDICATIONS  (PRN):  dextrose Gel 1 Dose(s) Oral once PRN Blood Glucose LESS THAN 70 milliGRAM(s)/deciliter  glucagon  Injectable 1 milliGRAM(s) IntraMuscular once PRN Glucose LESS THAN 70 milligrams/deciliter  acetaminophen  Suppository 650 milliGRAM(s) Rectal every 8 hours PRN For Temp greater than 38 C (100.4 F)        CAPILLARY BLOOD GLUCOSE  170 (24 Jul 2017 08:26)  273 (23 Jul 2017 21:36)  173 (23 Jul 2017 16:41)  340 (23 Jul 2017 14:54)  295 (23 Jul 2017 12:16)        I&O's Summary      PHYSICAL EXAM:  GENERAL: NAD, well-nourished  HEENT: mmm  CHEST/LUNG: CTABL  HEART: RRR, no m/r/g  ABDOMEN: soft, nt, nd  EXTREMITIES:  wwp, no c/c/e  PSYCH: pleasant  NEUROLOGY: non-focal  SKIN: No rashes or lesions    LABS:                        10.3   9.26  )-----------( 282      ( 24 Jul 2017 07:00 )             30.1     07-24    139  |  102  |  31<H>  ----------------------------<  178<H>  4.6   |  24  |  1.31<H>    Ca    8.0<L>      24 Jul 2017 07:00  Mg     2.2     07-24                RADIOLOGY & ADDITIONAL TESTS:    Imaging Personally Reviewed:    Consultant(s) Notes Reviewed:      Care Discussed with Consultants/Other Providers:

## 2017-07-24 NOTE — PROGRESS NOTE ADULT - PROBLEM SELECTOR PROBLEM 2
DM (diabetes mellitus)
Encephalopathy
Encephalopathy

## 2017-07-24 NOTE — PROGRESS NOTE ADULT - ASSESSMENT
75 y/o F w/ hx DM, HTN, CKD, ?dementia p/w lethargy and slurred speech and RT sided weakness fth CT chest LLL PNA, improved on abx

## 2017-07-24 NOTE — CHART NOTE - NSCHARTNOTEFT_GEN_A_CORE
Regarding MRI and MRA  of head result:No large territorial infarction.    Severely motion limited examination. Questionable moderate stenosis along   a long segment of the left M1 segment of the MCA versus artifact.   Diminished flow related signal is also seen involving the distal left   internal carotid artery. Better quality study is recommended for further   evaluation.  D/w Neuro Radiology 4096 no acute infarct..  D/w Neurology no need to repeat MRI as its negative for acute infarct: may order eeg----------
Called by RN that patient choked on food being fed to patient by family members at bedside.  Per family, they fed patient a piece of bread (not provided by hospital).  Patient subsequently choked and required Heimlich maneuver provided by DENIZ Wolfe.  Patient regurgitated up food following this.  Assessed patient at bedside.  Patient in NAD and appears comfortable.  She is able to communicate, no evidence of airway obstruction.  VSS as below.    Vital Signs Last 24 Hrs  T(C): 36.7 (23 Jul 2017 14:54), Max: 37.2 (22 Jul 2017 22:00)  T(F): 98.1 (23 Jul 2017 14:54), Max: 99 (22 Jul 2017 22:00)  HR: 80 (23 Jul 2017 14:54) (70 - 80)  BP: 158/78 (23 Jul 2017 14:54) (128/60 - 158/78)  BP(mean): --  RR: 18 (23 Jul 2017 14:54) (18 - 18)  SpO2: 96% (23 Jul 2017 14:54) (96% - 99%)    Repeat CXR done; awaiting read by radiology.  Aspiration precautions ordered. Patient on dysphagia 1 diet (pureed-thin liquids) as per S&S.  Enforced to family members at bedside the importance of only feeding patient pureed solids/thin liquids to prevent aspiration.  Family members demonstrated understanding.  Will continue to monitor and follow up above.  Patient being treated for aspiration PNA currently with azithromycin and CTX as per ID.  Dr. Siddiqui made aware.
Case d/w Dr. Rodriguez ID attending    OK to dc home on PO abx Ceftin and Azithromycin to complete total 7 days.     will dc today

## 2017-07-24 NOTE — PROGRESS NOTE ADULT - PROBLEM SELECTOR PROBLEM 1
Aspiration pneumonia of left lung, unspecified aspiration pneumonia type, unspecified part of lung
Encephalopathy
Aphasia

## 2017-07-24 NOTE — PROGRESS NOTE ADULT - PROBLEM SELECTOR PLAN 2
- Resolved  - likely multifactorial Pneumonia/Delirium secondary to electrolyte abnormality/infection/dementia  -repeat MRI/MRA -no acute CVA/no focal LT MCA stenosis unlikely TIA/CVA  - CT Chest no contrast-+LLL   - ceftriaxone/Azithromycin day 5  - Bcx neg till date  -monitor electrolytes  -Ucx-Ecoli ESBL asymptomatic ID recs hold carbapenems
monitor FS  -increase Lantus to 10 U sq   -goal HgBA1- 7%
monitor FS  -increase Lantus to 10 U sq   -goal HgBA1- 7%
monitor FS  -increase Lantus to 10 U sq   -goal HgBA1- 7% currently 7.9%
monitor FS  -increase Lantus to 10 U sq   -goal HgBA1- 7% currently 7.9%
According to family, patient gets more confused in the evening hours. This is likely secondary to unfamiliar surroundings as it has been happening since patient came from [facility]. If agitation, consider seroquel or zyprexa but avoid unless pt becoming a safety risk.

## 2017-07-24 NOTE — PROGRESS NOTE ADULT - PROBLEM SELECTOR PLAN 5
-unknown baseline likely CKD secondary to long standing DM with overlying pre renal azotemia improved with IVF  -consider restart ACE as outpt if Cr stable

## 2017-07-24 NOTE — PROGRESS NOTE ADULT - PROBLEM SELECTOR PLAN 1
- complete 5 days of abx  - fu ID re transitioning to PO for d/c  -S/S- puree with thin liquid-family educated on diet

## 2017-11-03 ENCOUNTER — INPATIENT (INPATIENT)
Facility: HOSPITAL | Age: 77
LOS: 5 days | Discharge: HOME | End: 2017-11-09
Attending: INTERNAL MEDICINE | Admitting: INTERNAL MEDICINE
Payer: MEDICAID

## 2017-11-03 VITALS
SYSTOLIC BLOOD PRESSURE: 136 MMHG | HEART RATE: 69 BPM | RESPIRATION RATE: 15 BRPM | TEMPERATURE: 98 F | OXYGEN SATURATION: 99 % | DIASTOLIC BLOOD PRESSURE: 58 MMHG

## 2017-11-03 DIAGNOSIS — R42 DIZZINESS AND GIDDINESS: ICD-10-CM

## 2017-11-03 DIAGNOSIS — Z29.9 ENCOUNTER FOR PROPHYLACTIC MEASURES, UNSPECIFIED: ICD-10-CM

## 2017-11-03 DIAGNOSIS — I10 ESSENTIAL (PRIMARY) HYPERTENSION: ICD-10-CM

## 2017-11-03 DIAGNOSIS — N17.9 ACUTE KIDNEY FAILURE, UNSPECIFIED: ICD-10-CM

## 2017-11-03 DIAGNOSIS — E78.5 HYPERLIPIDEMIA, UNSPECIFIED: ICD-10-CM

## 2017-11-03 DIAGNOSIS — E11.9 TYPE 2 DIABETES MELLITUS WITHOUT COMPLICATIONS: ICD-10-CM

## 2017-11-03 DIAGNOSIS — D64.9 ANEMIA, UNSPECIFIED: ICD-10-CM

## 2017-11-03 DIAGNOSIS — E03.9 HYPOTHYROIDISM, UNSPECIFIED: ICD-10-CM

## 2017-11-03 LAB
ALBUMIN SERPL ELPH-MCNC: 3.3 G/DL — SIGNIFICANT CHANGE UP (ref 3.3–5)
ALP SERPL-CCNC: 77 U/L — SIGNIFICANT CHANGE UP (ref 40–120)
ALT FLD-CCNC: 11 U/L — SIGNIFICANT CHANGE UP (ref 4–33)
AST SERPL-CCNC: 12 U/L — SIGNIFICANT CHANGE UP (ref 4–32)
BASE EXCESS BLDV CALC-SCNC: -1.5 MMOL/L — SIGNIFICANT CHANGE UP
BASE EXCESS BLDV CALC-SCNC: -5 MMOL/L — SIGNIFICANT CHANGE UP
BASOPHILS # BLD AUTO: 0.04 K/UL — SIGNIFICANT CHANGE UP (ref 0–0.2)
BASOPHILS NFR BLD AUTO: 0.3 % — SIGNIFICANT CHANGE UP (ref 0–2)
BILIRUB SERPL-MCNC: < 0.2 MG/DL — LOW (ref 0.2–1.2)
BLOOD GAS VENOUS - CREATININE: 1.85 MG/DL — HIGH (ref 0.5–1.3)
BLOOD GAS VENOUS - CREATININE: 1.86 MG/DL — HIGH (ref 0.5–1.3)
BUN SERPL-MCNC: 41 MG/DL — HIGH (ref 7–23)
BUN SERPL-MCNC: 46 MG/DL — HIGH (ref 7–23)
CALCIUM SERPL-MCNC: 8.2 MG/DL — LOW (ref 8.4–10.5)
CALCIUM SERPL-MCNC: 8.2 MG/DL — LOW (ref 8.4–10.5)
CHLORIDE BLDV-SCNC: 103 MMOL/L — SIGNIFICANT CHANGE UP (ref 96–108)
CHLORIDE BLDV-SCNC: 110 MMOL/L — HIGH (ref 96–108)
CHLORIDE SERPL-SCNC: 105 MMOL/L — SIGNIFICANT CHANGE UP (ref 98–107)
CHLORIDE SERPL-SCNC: 99 MMOL/L — SIGNIFICANT CHANGE UP (ref 98–107)
CK MB BLD-MCNC: 1.4 — SIGNIFICANT CHANGE UP (ref 0–2.5)
CK MB BLD-MCNC: 5.27 NG/ML — HIGH (ref 1–4.7)
CK MB BLD-MCNC: 5.95 NG/ML — HIGH (ref 1–4.7)
CK SERPL-CCNC: 422 U/L — HIGH (ref 25–170)
CK SERPL-CCNC: 427 U/L — HIGH (ref 25–170)
CO2 SERPL-SCNC: 21 MMOL/L — LOW (ref 22–31)
CO2 SERPL-SCNC: 22 MMOL/L — SIGNIFICANT CHANGE UP (ref 22–31)
CREAT SERPL-MCNC: 1.8 MG/DL — HIGH (ref 0.5–1.3)
CREAT SERPL-MCNC: 2.09 MG/DL — HIGH (ref 0.5–1.3)
EOSINOPHIL # BLD AUTO: 0.36 K/UL — SIGNIFICANT CHANGE UP (ref 0–0.5)
EOSINOPHIL NFR BLD AUTO: 2.9 % — SIGNIFICANT CHANGE UP (ref 0–6)
GAS PNL BLDV: 133 MMOL/L — LOW (ref 136–146)
GAS PNL BLDV: 136 MMOL/L — SIGNIFICANT CHANGE UP (ref 136–146)
GLUCOSE BLDV-MCNC: 164 — HIGH (ref 70–99)
GLUCOSE BLDV-MCNC: 274 — HIGH (ref 70–99)
GLUCOSE SERPL-MCNC: 139 MG/DL — HIGH (ref 70–99)
GLUCOSE SERPL-MCNC: 285 MG/DL — HIGH (ref 70–99)
HCO3 BLDV-SCNC: 20 MMOL/L — SIGNIFICANT CHANGE UP (ref 20–27)
HCO3 BLDV-SCNC: 22 MMOL/L — SIGNIFICANT CHANGE UP (ref 20–27)
HCT VFR BLD CALC: 30.6 % — LOW (ref 34.5–45)
HCT VFR BLDV CALC: 31.2 % — LOW (ref 34.5–45)
HCT VFR BLDV CALC: 32.4 % — LOW (ref 34.5–45)
HGB BLD-MCNC: 10 G/DL — LOW (ref 11.5–15.5)
HGB BLDV-MCNC: 10.1 G/DL — LOW (ref 11.5–15.5)
HGB BLDV-MCNC: 10.5 G/DL — LOW (ref 11.5–15.5)
IMM GRANULOCYTES # BLD AUTO: 0.05 # — SIGNIFICANT CHANGE UP
IMM GRANULOCYTES NFR BLD AUTO: 0.4 % — SIGNIFICANT CHANGE UP (ref 0–1.5)
LACTATE BLDV-MCNC: 1.1 MMOL/L — SIGNIFICANT CHANGE UP (ref 0.5–2)
LACTATE BLDV-MCNC: 2.5 MMOL/L — HIGH (ref 0.5–2)
LYMPHOCYTES # BLD AUTO: 1.77 K/UL — SIGNIFICANT CHANGE UP (ref 1–3.3)
LYMPHOCYTES # BLD AUTO: 14.1 % — SIGNIFICANT CHANGE UP (ref 13–44)
MAGNESIUM SERPL-MCNC: 2 MG/DL — SIGNIFICANT CHANGE UP (ref 1.6–2.6)
MCHC RBC-ENTMCNC: 29.9 PG — SIGNIFICANT CHANGE UP (ref 27–34)
MCHC RBC-ENTMCNC: 32.7 % — SIGNIFICANT CHANGE UP (ref 32–36)
MCV RBC AUTO: 91.3 FL — SIGNIFICANT CHANGE UP (ref 80–100)
MONOCYTES # BLD AUTO: 0.55 K/UL — SIGNIFICANT CHANGE UP (ref 0–0.9)
MONOCYTES NFR BLD AUTO: 4.4 % — SIGNIFICANT CHANGE UP (ref 2–14)
NEUTROPHILS # BLD AUTO: 9.77 K/UL — HIGH (ref 1.8–7.4)
NEUTROPHILS NFR BLD AUTO: 77.9 % — HIGH (ref 43–77)
NRBC # FLD: 0 — SIGNIFICANT CHANGE UP
PCO2 BLDV: 41 MMHG — SIGNIFICANT CHANGE UP (ref 41–51)
PCO2 BLDV: 50 MMHG — SIGNIFICANT CHANGE UP (ref 41–51)
PH BLDV: 7.3 PH — LOW (ref 7.32–7.43)
PH BLDV: 7.31 PH — LOW (ref 7.32–7.43)
PLATELET # BLD AUTO: 291 K/UL — SIGNIFICANT CHANGE UP (ref 150–400)
PMV BLD: 11.1 FL — SIGNIFICANT CHANGE UP (ref 7–13)
PO2 BLDV: 32 MMHG — LOW (ref 35–40)
PO2 BLDV: 46 MMHG — HIGH (ref 35–40)
POTASSIUM BLDV-SCNC: 4.5 MMOL/L — SIGNIFICANT CHANGE UP (ref 3.4–4.5)
POTASSIUM BLDV-SCNC: 5.5 MMOL/L — HIGH (ref 3.4–4.5)
POTASSIUM SERPL-MCNC: 4.9 MMOL/L — SIGNIFICANT CHANGE UP (ref 3.5–5.3)
POTASSIUM SERPL-MCNC: 5.4 MMOL/L — HIGH (ref 3.5–5.3)
POTASSIUM SERPL-SCNC: 4.9 MMOL/L — SIGNIFICANT CHANGE UP (ref 3.5–5.3)
POTASSIUM SERPL-SCNC: 5.4 MMOL/L — HIGH (ref 3.5–5.3)
PROT SERPL-MCNC: 6.5 G/DL — SIGNIFICANT CHANGE UP (ref 6–8.3)
RBC # BLD: 3.35 M/UL — LOW (ref 3.8–5.2)
RBC # FLD: 13.6 % — SIGNIFICANT CHANGE UP (ref 10.3–14.5)
SAO2 % BLDV: 53.8 % — LOW (ref 60–85)
SAO2 % BLDV: 76.3 % — SIGNIFICANT CHANGE UP (ref 60–85)
SODIUM SERPL-SCNC: 136 MMOL/L — SIGNIFICANT CHANGE UP (ref 135–145)
SODIUM SERPL-SCNC: 137 MMOL/L — SIGNIFICANT CHANGE UP (ref 135–145)
TROPONIN T SERPL-MCNC: < 0.06 NG/ML — SIGNIFICANT CHANGE UP (ref 0–0.06)
TROPONIN T SERPL-MCNC: < 0.06 NG/ML — SIGNIFICANT CHANGE UP (ref 0–0.06)
WBC # BLD: 12.54 K/UL — HIGH (ref 3.8–10.5)
WBC # FLD AUTO: 12.54 K/UL — HIGH (ref 3.8–10.5)

## 2017-11-03 PROCEDURE — 71010: CPT | Mod: 26

## 2017-11-03 PROCEDURE — 99221 1ST HOSP IP/OBS SF/LOW 40: CPT | Mod: GC

## 2017-11-03 PROCEDURE — 99223 1ST HOSP IP/OBS HIGH 75: CPT | Mod: AI

## 2017-11-03 PROCEDURE — 71250 CT THORAX DX C-: CPT | Mod: 26

## 2017-11-03 PROCEDURE — 70450 CT HEAD/BRAIN W/O DYE: CPT | Mod: 26

## 2017-11-03 RX ORDER — AMLODIPINE BESYLATE 2.5 MG/1
5 TABLET ORAL DAILY
Qty: 0 | Refills: 0 | Status: DISCONTINUED | OUTPATIENT
Start: 2017-11-03 | End: 2017-11-03

## 2017-11-03 RX ORDER — AZITHROMYCIN 500 MG/1
0 TABLET, FILM COATED ORAL
Qty: 0 | Refills: 0 | COMMUNITY

## 2017-11-03 RX ORDER — SODIUM CHLORIDE 9 MG/ML
1000 INJECTION INTRAMUSCULAR; INTRAVENOUS; SUBCUTANEOUS
Qty: 0 | Refills: 0 | Status: DISCONTINUED | OUTPATIENT
Start: 2017-11-03 | End: 2017-11-04

## 2017-11-03 RX ORDER — GLUCAGON INJECTION, SOLUTION 0.5 MG/.1ML
1 INJECTION, SOLUTION SUBCUTANEOUS ONCE
Qty: 0 | Refills: 0 | Status: DISCONTINUED | OUTPATIENT
Start: 2017-11-03 | End: 2017-11-09

## 2017-11-03 RX ORDER — LEVOTHYROXINE SODIUM 125 MCG
75 TABLET ORAL DAILY
Qty: 0 | Refills: 0 | Status: DISCONTINUED | OUTPATIENT
Start: 2017-11-03 | End: 2017-11-09

## 2017-11-03 RX ORDER — SODIUM CHLORIDE 9 MG/ML
1000 INJECTION INTRAMUSCULAR; INTRAVENOUS; SUBCUTANEOUS ONCE
Qty: 0 | Refills: 0 | Status: COMPLETED | OUTPATIENT
Start: 2017-11-03 | End: 2017-11-03

## 2017-11-03 RX ORDER — DEXTROSE 50 % IN WATER 50 %
25 SYRINGE (ML) INTRAVENOUS ONCE
Qty: 0 | Refills: 0 | Status: DISCONTINUED | OUTPATIENT
Start: 2017-11-03 | End: 2017-11-09

## 2017-11-03 RX ORDER — INSULIN GLARGINE 100 [IU]/ML
10 INJECTION, SOLUTION SUBCUTANEOUS
Qty: 0 | Refills: 0 | COMMUNITY

## 2017-11-03 RX ORDER — ASPIRIN/CALCIUM CARB/MAGNESIUM 324 MG
81 TABLET ORAL DAILY
Qty: 0 | Refills: 0 | Status: DISCONTINUED | OUTPATIENT
Start: 2017-11-03 | End: 2017-11-09

## 2017-11-03 RX ORDER — AZITHROMYCIN 500 MG/1
1 TABLET, FILM COATED ORAL
Qty: 0 | Refills: 0 | COMMUNITY

## 2017-11-03 RX ORDER — INFLUENZA VIRUS VACCINE 15; 15; 15; 15 UG/.5ML; UG/.5ML; UG/.5ML; UG/.5ML
0.5 SUSPENSION INTRAMUSCULAR ONCE
Qty: 0 | Refills: 0 | Status: COMPLETED | OUTPATIENT
Start: 2017-11-03 | End: 2017-11-09

## 2017-11-03 RX ORDER — INSULIN GLARGINE 100 [IU]/ML
10 INJECTION, SOLUTION SUBCUTANEOUS EVERY MORNING
Qty: 0 | Refills: 0 | Status: DISCONTINUED | OUTPATIENT
Start: 2017-11-03 | End: 2017-11-03

## 2017-11-03 RX ORDER — MEMANTINE HYDROCHLORIDE 10 MG/1
5 TABLET ORAL
Qty: 0 | Refills: 0 | Status: DISCONTINUED | OUTPATIENT
Start: 2017-11-03 | End: 2017-11-09

## 2017-11-03 RX ORDER — INSULIN LISPRO 100/ML
VIAL (ML) SUBCUTANEOUS
Qty: 0 | Refills: 0 | Status: DISCONTINUED | OUTPATIENT
Start: 2017-11-03 | End: 2017-11-09

## 2017-11-03 RX ORDER — ONDANSETRON 8 MG/1
4 TABLET, FILM COATED ORAL ONCE
Qty: 0 | Refills: 0 | Status: COMPLETED | OUTPATIENT
Start: 2017-11-03 | End: 2017-11-03

## 2017-11-03 RX ORDER — DEXTROSE 50 % IN WATER 50 %
12.5 SYRINGE (ML) INTRAVENOUS ONCE
Qty: 0 | Refills: 0 | Status: DISCONTINUED | OUTPATIENT
Start: 2017-11-03 | End: 2017-11-09

## 2017-11-03 RX ORDER — SODIUM POLYSTYRENE SULFONATE 4.1 MEQ/G
15 POWDER, FOR SUSPENSION ORAL ONCE
Qty: 0 | Refills: 0 | Status: COMPLETED | OUTPATIENT
Start: 2017-11-03 | End: 2017-11-03

## 2017-11-03 RX ORDER — INSULIN GLARGINE 100 [IU]/ML
8 INJECTION, SOLUTION SUBCUTANEOUS
Qty: 0 | Refills: 0 | COMMUNITY

## 2017-11-03 RX ORDER — HEPARIN SODIUM 5000 [USP'U]/ML
5000 INJECTION INTRAVENOUS; SUBCUTANEOUS EVERY 8 HOURS
Qty: 0 | Refills: 0 | Status: DISCONTINUED | OUTPATIENT
Start: 2017-11-03 | End: 2017-11-09

## 2017-11-03 RX ORDER — INSULIN LISPRO 100/ML
VIAL (ML) SUBCUTANEOUS AT BEDTIME
Qty: 0 | Refills: 0 | Status: DISCONTINUED | OUTPATIENT
Start: 2017-11-03 | End: 2017-11-09

## 2017-11-03 RX ORDER — FLUTICASONE PROPIONATE 220 MCG
2 AEROSOL WITH ADAPTER (GRAM) INHALATION
Qty: 0 | Refills: 0 | Status: DISCONTINUED | OUTPATIENT
Start: 2017-11-03 | End: 2017-11-09

## 2017-11-03 RX ORDER — SODIUM CHLORIDE 9 MG/ML
1000 INJECTION, SOLUTION INTRAVENOUS
Qty: 0 | Refills: 0 | Status: DISCONTINUED | OUTPATIENT
Start: 2017-11-03 | End: 2017-11-09

## 2017-11-03 RX ORDER — CEFOXITIN 1 G/1
1 INJECTION, POWDER, FOR SOLUTION INTRAVENOUS
Qty: 0 | Refills: 0 | COMMUNITY

## 2017-11-03 RX ORDER — ASPIRIN/CALCIUM CARB/MAGNESIUM 324 MG
1 TABLET ORAL
Qty: 0 | Refills: 0 | COMMUNITY

## 2017-11-03 RX ORDER — DEXTROSE 50 % IN WATER 50 %
1 SYRINGE (ML) INTRAVENOUS ONCE
Qty: 0 | Refills: 0 | Status: DISCONTINUED | OUTPATIENT
Start: 2017-11-03 | End: 2017-11-09

## 2017-11-03 RX ADMIN — HEPARIN SODIUM 5000 UNIT(S): 5000 INJECTION INTRAVENOUS; SUBCUTANEOUS at 15:16

## 2017-11-03 RX ADMIN — MEMANTINE HYDROCHLORIDE 5 MILLIGRAM(S): 10 TABLET ORAL at 19:31

## 2017-11-03 RX ADMIN — SODIUM CHLORIDE 75 MILLILITER(S): 9 INJECTION INTRAMUSCULAR; INTRAVENOUS; SUBCUTANEOUS at 21:20

## 2017-11-03 RX ADMIN — Medication 1: at 18:07

## 2017-11-03 RX ADMIN — SODIUM CHLORIDE 1000 MILLILITER(S): 9 INJECTION INTRAMUSCULAR; INTRAVENOUS; SUBCUTANEOUS at 17:18

## 2017-11-03 RX ADMIN — Medication 2 PUFF(S): at 21:58

## 2017-11-03 RX ADMIN — SODIUM POLYSTYRENE SULFONATE 15 GRAM(S): 4.1 POWDER, FOR SUSPENSION ORAL at 17:18

## 2017-11-03 RX ADMIN — ONDANSETRON 4 MILLIGRAM(S): 8 TABLET, FILM COATED ORAL at 03:33

## 2017-11-03 RX ADMIN — SODIUM CHLORIDE 75 MILLILITER(S): 9 INJECTION INTRAMUSCULAR; INTRAVENOUS; SUBCUTANEOUS at 18:06

## 2017-11-03 RX ADMIN — Medication 81 MILLIGRAM(S): at 15:16

## 2017-11-03 RX ADMIN — HEPARIN SODIUM 5000 UNIT(S): 5000 INJECTION INTRAVENOUS; SUBCUTANEOUS at 21:20

## 2017-11-03 RX ADMIN — SODIUM CHLORIDE 1000 MILLILITER(S): 9 INJECTION INTRAMUSCULAR; INTRAVENOUS; SUBCUTANEOUS at 03:33

## 2017-11-03 NOTE — H&P ADULT - NEUROLOGICAL DETAILS
alert and oriented x 3/responds to pain/sensation intact/cranial nerves intact/responds to verbal commands/normal strength

## 2017-11-03 NOTE — PHYSICAL THERAPY INITIAL EVALUATION ADULT - PERTINENT HX OF CURRENT PROBLEM, REHAB EVAL
77 y/o Wallisian speaking female with a PMHx of HTN, HLD, DM, CKD, hypothyroidism, and anemia presents to ED complaining of dizziness, nausea and vomiting, found to be in NIXON.

## 2017-11-03 NOTE — ED SUB INTERN NOTE - CONSTITUTIONAL, MLM
normal... Well appearing, well nourished, awake, alert, oriented to person, place, time/situation with prompting and in no apparent distress.

## 2017-11-03 NOTE — H&P ADULT - RS GEN PE MLT RESP DETAILS PC
no intercostal retractions/no rales/respirations non-labored/no wheezes/no chest wall tenderness/no rhonchi/good air movement/airway patent/breath sounds equal/clear to auscultation bilaterally

## 2017-11-03 NOTE — ED PROVIDER NOTE - OBJECTIVE STATEMENT
76F h/o HTN, DM, HLD presents for n/v/d.  States symptoms started several hours ago, woke up to urinate. Reports dizziness while seated on the toilet w room spinning.  One episode of vomiting and diarrhea. Now dizziness resolved, no LOC. Now with crampy abdominal pain. No fever.  No CP/SOB. 76F h/o HTN, DM, HLD presents for n/v/d.  States symptoms started several hours ago, woke up to urinate. Reports dizziness while seated on the toilet w room spinning. States dizziness was initially vertigo, now more lightheaded. One episode of vomiting and diarrhea. Now dizziness improved, worse w walking, Denies syncope, no LOC. Now with crampy abdominal pain. No fever.  No CP/SOB.

## 2017-11-03 NOTE — CONSULT NOTE ADULT - SUBJECTIVE AND OBJECTIVE BOX
Neurology Consult Note    76F h/o HTN, DM, HLD presents for n/v/d.  States symptoms started several hours ago, woke up to urinate. Reports dizziness while seated on the toilet w room spinning.  One episode of vomiting and diarrhea. Now dizziness resolved, no LOC. Now with crampy abdominal pain. No fever.  No CP/SOB.    PMHx -   Social  NKDA    MEDICATIONS  (STANDING):    MEDICATIONS  (PRN):    Vital Signs Last 24 Hrs  T(C): 36.5 (03 Nov 2017 00:09), Max: 36.5 (03 Nov 2017 00:09)  T(F): 97.7 (03 Nov 2017 00:09), Max: 97.7 (03 Nov 2017 00:09)  HR: 69 (03 Nov 2017 00:09) (69 - 69)  BP: 136/58 (03 Nov 2017 00:09) (136/58 - 136/58)  BP(mean): --  RR: 15 (03 Nov 2017 00:09) (15 - 15)  SpO2: 99% (03 Nov 2017 00:09) (99% - 99%)    Neurological Exam    Labs                        10.0   12.54 )-----------( 291      ( 03 Nov 2017 03:00 )             30.6   11-03    136  |  99  |  46<H>  ----------------------------<  285<H>  4.9   |  22  |  2.09<H>    Ca    8.2<L>      03 Nov 2017 03:00    TPro  6.5  /  Alb  3.3  /  TBili  < 0.2<L>  /  DBili  x   /  AST  12  /  ALT  11  /  AlkPhos  77  11-03    Images  CT head - No acute intracranial hemorrhage, large cortical infarct or mass effect,   given the extent of artifact. No significant interval change since   7/20/2017. If clinically indicated, a short-term follow-up or an MRI may   be obtained for further evaluation. Neurology Consult Note    76F h/o HTN, DM, HLD presents for n/v/d.  States symptoms started several hours ago, woke up to urinate. Reports dizziness while seated on the toilet w room spinning.  One episode of vomiting and diarrhea. Now dizziness resolved, no LOC. Now with crampy abdominal pain. No fever.  No CP/SOB.    PMHx - as above  Social - denies toxic habits  NKDA    MEDICATIONS  (STANDING):    MEDICATIONS  (PRN):    Vital Signs Last 24 Hrs  T(C): 36.5 (03 Nov 2017 00:09), Max: 36.5 (03 Nov 2017 00:09)  T(F): 97.7 (03 Nov 2017 00:09), Max: 97.7 (03 Nov 2017 00:09)  HR: 69 (03 Nov 2017 00:09) (69 - 69)  BP: 136/58 (03 Nov 2017 00:09) (136/58 - 136/58)  BP(mean): --  RR: 15 (03 Nov 2017 00:09) (15 - 15)  SpO2: 99% (03 Nov 2017 00:09) (99% - 99%)    Neurological Exam    Labs                        10.0   12.54 )-----------( 291      ( 03 Nov 2017 03:00 )             30.6   11-03    136  |  99  |  46<H>  ----------------------------<  285<H>  4.9   |  22  |  2.09<H>    Ca    8.2<L>      03 Nov 2017 03:00    TPro  6.5  /  Alb  3.3  /  TBili  < 0.2<L>  /  DBili  x   /  AST  12  /  ALT  11  /  AlkPhos  77  11-03    Images  CT head - No acute intracranial hemorrhage, large cortical infarct or mass effect,   given the extent of artifact. No significant interval change since   7/20/2017. If clinically indicated, a short-term follow-up or an MRI may   be obtained for further evaluation.

## 2017-11-03 NOTE — H&P ADULT - NEGATIVE CARDIOVASCULAR SYMPTOMS
no orthopnea/no palpitations/no paroxysmal nocturnal dyspnea/no chest pain/no claudication/no peripheral edema/no dyspnea on exertion

## 2017-11-03 NOTE — ED ADULT NURSE REASSESSMENT NOTE - NS ED NURSE REASSESS COMMENT FT1
Rpt rcvd from prior RN. Pt awake/alert. In NAD. Awaiting neuro c/s. No neuro deficits observed at this time. +/= hand  B/L. 20g IV observed to R lower arm. Will continue to monitor.

## 2017-11-03 NOTE — H&P ADULT - NEGATIVE OPHTHALMOLOGIC SYMPTOMS
no pain L/no diplopia/no photophobia/no loss of vision R/no pain R/no loss of vision L/no blurred vision L/no blurred vision R

## 2017-11-03 NOTE — H&P ADULT - NSHPLABSRESULTS_GEN_ALL_CORE
EKG: NSR with peaked T waves V2-V4  CE x1: Pending   CT head: No acute intracranial hemorrhage, large cortical infarct or mass effect, given the extent of artifact. No significant interval change since 7/20/2017.  WBC: 12.54  H/H: 10.0/30.6  BUN/Cr: 46/2.09  Glucose: 285 EKG: NSR with peaked T waves V2-V4  CE x1: Pending   CT head: No acute intracranial hemorrhage, large cortical infarct or mass effect, given the extent of artifact. No significant interval change since 7/20/2017.  WBC: 12.54  H/H: 10.0/30.6  BUN/Cr: 46/2.09  Glucose: 285    11-03    137  |  105  |  41<H>  ----------------------------<  139<H>  5.4<H>   |  21<L>  |  1.80<H>    Ca    8.2<L>      03 Nov 2017 13:49  Mg     2.0     11-03    TPro  6.5  /  Alb  3.3  /  TBili  < 0.2<L>  /  DBili  x   /  AST  12  /  ALT  11  /  AlkPhos  77  11-03                          10.0   12.54 )-----------( 291      ( 03 Nov 2017 03:00 )             30.6

## 2017-11-03 NOTE — H&P ADULT - NSHPSOCIALHISTORY_GEN_ALL_CORE
. Lives with . Independent with ambulation but requires some assistance with ADLs. No HHA or VN services. Denies smoking/drinking.

## 2017-11-03 NOTE — H&P ADULT - PMH
Anemia    CKD (chronic kidney disease)    DM (diabetes mellitus)    HLD (hyperlipidemia)    HTN (hypertension)    Hypothyroidism Anemia    CKD (chronic kidney disease)    Dementia without behavioral disturbance, unspecified dementia type    DM (diabetes mellitus)    HLD (hyperlipidemia)    HTN (hypertension)    Hypothyroidism

## 2017-11-03 NOTE — ED PROVIDER NOTE - PROGRESS NOTE DETAILS
Ataxic gait, plan for neuro exam. FANTASMA Martin MD Discussed with Neurology, okay with outpatient follow up IF patient ambulating without difficutly.   REASSESSMENT- patient seen and walked. unable to ambulate without feeling vertigo and unstable. will admit for MRI. symptoms not resolved.   Discussed with Dr. Mayorga. Beulah: s/o Dr. Martin pending neuro eval. Neuro cleared. pt ambulating well with assistance (Normally uses a cane) but still feels ataxic. given rf and still somewhat symptomatic, would benefit from admission for mri r/o cva.

## 2017-11-03 NOTE — PHYSICAL THERAPY INITIAL EVALUATION ADULT - GENERAL OBSERVATIONS, REHAB EVAL
Pt encountered in seated in chair, no distress, AxOx3, with +IV, +cardiac monitor, and son @bedside. Pt encountered seated in chair, no distress, AxOx3, with +IV, +cardiac monitor, and son @bedside.

## 2017-11-03 NOTE — PHYSICAL THERAPY INITIAL EVALUATION ADULT - DISCHARGE DISPOSITION, PT EVAL
TBD; anticipating home with no skilled PT needs; please follow PT notes carefully as pt @ this time is orthostatic

## 2017-11-03 NOTE — H&P ADULT - PROBLEM SELECTOR PLAN 2
Monitor renal function and electrolytes  S/P 1L NS in ED  Hold Lasix and Lisinopril for now  Consider renal consult, renal US  Avoid NSAIDs and nephrotoxins Monitor renal function and electrolytes  S/P 1L NS in ED  Hold Lasix and Lisinopril for now  Son unsure why patient is even on Lasix (possible lower extremity edema?)  Consider renal consult, renal US  Avoid NSAIDs and nephrotoxins

## 2017-11-03 NOTE — PHYSICAL THERAPY INITIAL EVALUATION ADULT - ADDITIONAL COMMENTS
Pt lives in a private house with sister, brother-in-law and 3 nieces with ~4 GURU; bedroom and bathroom are on the first floor. Prior to hospital admission pt ambulated independently using single axis cane. Pt independently dresses her self but needs occasional assist with showering. Pt's last fall was in June 2017     ORTHOSTATIC: Seated /83mmHg, Standing /67mmHg, RN aware/present    Pt left comfortable in bed, NAD, all lines intact, all precautions maintained, with call bell in reach, bed alarm on, and son @bedside.

## 2017-11-03 NOTE — CONSULT NOTE ADULT - ASSESSMENT
76F h/o HTN, DM, HLD presents for n/v/d.  States symptoms started several hours ago, woke up to urinate. Reports dizziness while seated on the toilet w room spinning.  One episode of vomiting and diarrhea. Now dizziness resolved, no LOC. Now with crampy abdominal pain. No fever.  No CP/SOB.    Reccs: 76F h/o HTN, DM, HLD presents for n/v/d.  States symptoms started several hours ago, woke up to urinate. Reports dizziness while seated on the toilet w room spinning.  One episode of vomiting and diarrhea. Now dizziness has resolved, no LOC. PE is unremarkable for any focal findings. Ct head is unremarkable. Given clinical scenario, dizziness (now resolved) is likely related to possible viral gi illness.   Acute cva unlikely.     Reccs:  IVF  OOB  Pt may be DC'ed with close follow up in the Neurology clinic. [phone]. 76F h/o HTN, DM, HLD presents for n/v/d.  States symptoms started several hours ago, woke up to urinate. Reports dizziness while seated on the toilet w room spinning.  One episode of vomiting and diarrhea. Now dizziness has resolved, no LOC. PE is unremarkable for any focal findings. Ct head is unremarkable. Given clinical scenario, dizziness (now resolved) is likely related to possible viral GI illness.   Acute cva unlikely.     Reccs:  IVF  OOB  Pt may be DC'ed with close follow up in the Neurology clinic. [phone].

## 2017-11-03 NOTE — H&P ADULT - ATTENDING COMMENTS
I personally saw and examined the patient.  Discussed with the tele PA and agree with the resident's findings and plan as documented above. I personally saw and examined the patient.  Discussed with the tele PA and agree with the resident's findings and plan as documented above.  +orthostatics --> rebolus 2nd liter, hold bp meds, f/u orthostatics, f/u tele  other issues as noted above  persistent LLL opacity - d/w pt/family --> check noncontrast chest CT I personally saw and examined the patient.  Discussed with the tele PA and agree with the resident's findings and plan as documented above.  +orthostatics --> rebolus 2nd liter, hold bp meds, f/u orthostatics, f/u tele  dementia - on Namenda, continue for now, pt at risk for delirium, d/w family, will stay with pt as possible, avoid Ambien, benzos, anticholinergics, day/night cues  other issues as noted above  persistent LLL opacity - d/w pt/family --> check noncontrast chest CT

## 2017-11-03 NOTE — ED PROVIDER NOTE - PMH
Anemia    CKD (chronic kidney disease)    DM (diabetes mellitus)    HLD (hyperlipidemia)    HTN (hypertension)    Hypothyroid    Pneumonia

## 2017-11-03 NOTE — H&P ADULT - PSYCHIATRIC
not applicable not examined Affect and characteristics of appearance, verbalizations, behaviors are appropriate negative

## 2017-11-03 NOTE — H&P ADULT - HISTORY OF PRESENT ILLNESS
75 y/o Italian speaking female with a PMHx of HTN, HLD, DM, CKD, hypothyroidism, and anemia presents to ED complaining of dizziness since last night. Pt is Italian speaking and  services offered but son at bedside would like to translate for patient. Pt is a relatively independent female who ambulates with cane (intermittently); family is involved for help and pt does not have a HHA or VN. Last night at around 10:00PM when pt was in the bathroom on the toilet bowl, she started to experience sudden onset of dizziness, described as "the room spinning." Pt tried to get up on her feet but her legs felt too weak and she was unable to get up off the toilet bowl so she sat back down. During this time, pt felt nauseas and had one episode of non-bloody and non-bilious vomiting, associated with one episode of non-bloody diarrhea. Pt called to her daughter who was able to help her up and bring her back to the bedroom. Pt has continued to experience dizziness, worse with exertion and better with rest. Her symptoms have improved but are still there. Patient's finger stick was apparently 180 during this time as per son. Pt denies fever, chills, headache, visual deficits, chest pain, shortness of breath, palpitations, abdominal pain, constipation, hematochezia, melena, dysuria, hematuria, LOC, syncope, fall, seizures, convulsions, hemiparesis, paresthesias. Upon arrival to ED, EKG: NSR with peaked T waves V2-V4. CT head: No acute intracranial hemorrhage, large cortical infarct or mass effect, given the extent of artifact. No significant interval change since 7/20/2017. WBC: 12.54. H/H: 10.0/30.6. BUN/Cr: 46/2.09. Glucose: 285. Pt was given 1L NS and admitted to telemetry. 77 y/o Chadian speaking female with a PMHx of dementia, HTN, HLD, DM, CKD, hypothyroidism, and anemia presents to ED complaining of dizziness since last night. Pt is Chadian speaking and  services offered but son at bedside would like to translate for patient. Pt is a relatively independent female who ambulates with cane (intermittently); family is involved for help and pt does not have a HHA or VN. Last night at around 10:00PM when pt was in the bathroom on the toilet bowl, she started to experience sudden onset of dizziness, described as "the room spinning." Pt tried to get up on her feet but her legs felt too weak and she was unable to get up off the toilet bowl so she sat back down. During this time, pt felt nauseas and had one episode of non-bloody and non-bilious vomiting, associated with one episode of non-bloody diarrhea. Pt called to her daughter who was able to help her up and bring her back to the bedroom. Pt has continued to experience dizziness, worse with exertion and better with rest. Her symptoms have improved but are still there. Patient's finger stick was apparently 180 during this time as per son. Pt denies fever, chills, headache, visual deficits, chest pain, shortness of breath, palpitations, abdominal pain, constipation, hematochezia, melena, dysuria, hematuria, LOC, syncope, fall, seizures, convulsions, hemiparesis, paresthesias. Upon arrival to ED, EKG: NSR with peaked T waves V2-V4. CT head: No acute intracranial hemorrhage, large cortical infarct or mass effect, given the extent of artifact. No significant interval change since 7/20/2017. WBC: 12.54. H/H: 10.0/30.6. BUN/Cr: 46/2.09. Glucose: 285. Pt was given 1L NS and admitted to telemetry.

## 2017-11-03 NOTE — H&P ADULT - ASSESSMENT
77 y/o English speaking female with a PMHx of HTN, HLD, DM, CKD, hypothyroidism, and anemia presents to ED complaining of dizziness, nausea and vomiting, found to be in NIXON. 77 y/o Ethiopian speaking female with a PMHx of dementia, HTN, HLD, DM, CKD, hypothyroidism, and anemia presents to ED complaining of dizziness, nausea and vomiting, found to be in NIXON.

## 2017-11-03 NOTE — H&P ADULT - PROBLEM SELECTOR PLAN 1
Admit to telemetry, serial EKGs, serial cardiac enzymes, orthostatics  Check CBC, CMP, TSH, FLP, HgA1C, UA  Appreciate neurology consult  No further inpatient neuro workup; discussed with neuro, no need for MRI/MRA  Echocardiogram ordered  Monitor on telemetry to rule out cardiac etiology  PT eval ordered  F/U MD note Admit to telemetry, serial EKGs, serial cardiac enzymes, orthostatics  Check CBC, CMP, TSH, FLP, HgA1C  Appreciate neurology consult  No further inpatient neuro workup; discussed with neuro, no need for MRI/MRA  Echocardiogram ordered  Monitor on telemetry to rule out cardiac etiology  Obtain UA, urine cultures and CXR given leukocytosis to R/O infectious source  PT eval ordered  F/U MD note Admit to telemetry, serial EKGs, serial cardiac enzymes, orthostatics  Check CBC, CMP, TSH, FLP, HgA1C  Appreciate neurology consult  No further inpatient neuro workup; discussed with neuro, no need for MRI/MRA  Echocardiogram ordered  Monitor on telemetry to rule out cardiac etiology  Obtain UA, urine cultures and CXR given leukocytosis to R/O infectious source  PT eval ordered

## 2017-11-03 NOTE — ED SUB INTERN NOTE - OBJECTIVE STATEMENT FT
77y/o F with PMH of DM, HTN, CKD, Anemia. presents with acute onset dizziness, N/V/D that happened @ 10pm after waking up from sleep to go to the bathroom. Patient walked to the bathroom when this episode occurred. - SOB, -Chest Pain - fever and feels better currently.    Pt is primarily Upper sorbian speaking with translation done by Family member

## 2017-11-03 NOTE — H&P ADULT - NSHPPHYSICALEXAM_GEN_ALL_CORE
Vital Signs Last 24 Hrs  T(C): 36.9 (03 Nov 2017 16:22), Max: 36.9 (03 Nov 2017 16:22)  T(F): 98.4 (03 Nov 2017 16:22), Max: 98.4 (03 Nov 2017 16:22)  HR: 81 (03 Nov 2017 16:22) (69 - 82)  BP: 173/59 (03 Nov 2017 16:22) (121/67 - 176/57)  RR: 18 (03 Nov 2017 16:22) (15 - 18)  SpO2: 98% (03 Nov 2017 16:22) (98% - 100%)

## 2017-11-03 NOTE — H&P ADULT - PROBLEM SELECTOR PLAN 5
Continue basal/bolus insulin and insulin sliding scale for coverage  Hold oral hypoglycemics  Check HgA1C, FS  Diabetic diet

## 2017-11-03 NOTE — ED PROVIDER NOTE - MEDICAL DECISION MAKING DETAILS
Sudden onset dizziness w n/v, difficulty ambulating, no other neuro deficits.  Plan for CT head, labs, UA.

## 2017-11-04 LAB
APPEARANCE UR: SIGNIFICANT CHANGE UP
BACTERIA # UR AUTO: SIGNIFICANT CHANGE UP
BILIRUB UR-MCNC: NEGATIVE — SIGNIFICANT CHANGE UP
BLOOD UR QL VISUAL: HIGH
BUN SERPL-MCNC: 30 MG/DL — HIGH (ref 7–23)
CALCIUM SERPL-MCNC: 7.6 MG/DL — LOW (ref 8.4–10.5)
CHLORIDE SERPL-SCNC: 107 MMOL/L — SIGNIFICANT CHANGE UP (ref 98–107)
CHOLEST SERPL-MCNC: 183 MG/DL — SIGNIFICANT CHANGE UP (ref 120–199)
CO2 SERPL-SCNC: 21 MMOL/L — LOW (ref 22–31)
COLOR SPEC: SIGNIFICANT CHANGE UP
CREAT SERPL-MCNC: 1.38 MG/DL — HIGH (ref 0.5–1.3)
GLUCOSE SERPL-MCNC: 123 MG/DL — HIGH (ref 70–99)
GLUCOSE UR-MCNC: NEGATIVE — SIGNIFICANT CHANGE UP
HBA1C BLD-MCNC: 8.3 % — HIGH (ref 4–5.6)
HCT VFR BLD CALC: 29.8 % — LOW (ref 34.5–45)
HDLC SERPL-MCNC: 44 MG/DL — LOW (ref 45–65)
HGB BLD-MCNC: 9.5 G/DL — LOW (ref 11.5–15.5)
KETONES UR-MCNC: NEGATIVE — SIGNIFICANT CHANGE UP
LEUKOCYTE ESTERASE UR-ACNC: HIGH
LIPID PNL WITH DIRECT LDL SERPL: 123 MG/DL — SIGNIFICANT CHANGE UP
MAGNESIUM SERPL-MCNC: 2 MG/DL — SIGNIFICANT CHANGE UP (ref 1.6–2.6)
MCHC RBC-ENTMCNC: 28.8 PG — SIGNIFICANT CHANGE UP (ref 27–34)
MCHC RBC-ENTMCNC: 31.9 % — LOW (ref 32–36)
MCV RBC AUTO: 90.3 FL — SIGNIFICANT CHANGE UP (ref 80–100)
NITRITE UR-MCNC: NEGATIVE — SIGNIFICANT CHANGE UP
NON-SQ EPI CELLS # UR AUTO: <1 — SIGNIFICANT CHANGE UP
NRBC # FLD: 0 — SIGNIFICANT CHANGE UP
PH UR: 6 — SIGNIFICANT CHANGE UP (ref 4.6–8)
PLATELET # BLD AUTO: 286 K/UL — SIGNIFICANT CHANGE UP (ref 150–400)
PMV BLD: 11.5 FL — SIGNIFICANT CHANGE UP (ref 7–13)
POTASSIUM SERPL-MCNC: 4.6 MMOL/L — SIGNIFICANT CHANGE UP (ref 3.5–5.3)
POTASSIUM SERPL-SCNC: 4.6 MMOL/L — SIGNIFICANT CHANGE UP (ref 3.5–5.3)
PROT UR-MCNC: 100 — HIGH
RBC # BLD: 3.3 M/UL — LOW (ref 3.8–5.2)
RBC # FLD: 13.7 % — SIGNIFICANT CHANGE UP (ref 10.3–14.5)
RBC CASTS # UR COMP ASSIST: SIGNIFICANT CHANGE UP (ref 0–?)
SODIUM SERPL-SCNC: 139 MMOL/L — SIGNIFICANT CHANGE UP (ref 135–145)
SP GR SPEC: 1.01 — SIGNIFICANT CHANGE UP (ref 1–1.03)
SQUAMOUS # UR AUTO: SIGNIFICANT CHANGE UP
TRIGL SERPL-MCNC: 173 MG/DL — HIGH (ref 10–149)
TSH SERPL-MCNC: 5.18 UIU/ML — HIGH (ref 0.27–4.2)
UROBILINOGEN FLD QL: NORMAL E.U. — SIGNIFICANT CHANGE UP (ref 0.1–0.2)
WBC # BLD: 8.01 K/UL — SIGNIFICANT CHANGE UP (ref 3.8–10.5)
WBC # FLD AUTO: 8.01 K/UL — SIGNIFICANT CHANGE UP (ref 3.8–10.5)
WBC CLUMPS #/AREA URNS HPF: PRESENT — HIGH (ref 0–?)
WBC UR QL: HIGH (ref 0–?)
YEAST BUDDING # UR COMP ASSIST: SIGNIFICANT CHANGE UP

## 2017-11-04 PROCEDURE — 99233 SBSQ HOSP IP/OBS HIGH 50: CPT

## 2017-11-04 RX ORDER — SODIUM CHLORIDE 9 MG/ML
1000 INJECTION INTRAMUSCULAR; INTRAVENOUS; SUBCUTANEOUS
Qty: 0 | Refills: 0 | Status: DISCONTINUED | OUTPATIENT
Start: 2017-11-04 | End: 2017-11-04

## 2017-11-04 RX ORDER — AMLODIPINE BESYLATE 2.5 MG/1
5 TABLET ORAL DAILY
Qty: 0 | Refills: 0 | Status: DISCONTINUED | OUTPATIENT
Start: 2017-11-04 | End: 2017-11-05

## 2017-11-04 RX ADMIN — HEPARIN SODIUM 5000 UNIT(S): 5000 INJECTION INTRAVENOUS; SUBCUTANEOUS at 21:22

## 2017-11-04 RX ADMIN — Medication: at 18:35

## 2017-11-04 RX ADMIN — Medication 75 MICROGRAM(S): at 06:11

## 2017-11-04 RX ADMIN — SODIUM CHLORIDE 75 MILLILITER(S): 9 INJECTION INTRAMUSCULAR; INTRAVENOUS; SUBCUTANEOUS at 08:21

## 2017-11-04 RX ADMIN — Medication 2: at 21:30

## 2017-11-04 RX ADMIN — Medication 81 MILLIGRAM(S): at 12:09

## 2017-11-04 RX ADMIN — MEMANTINE HYDROCHLORIDE 5 MILLIGRAM(S): 10 TABLET ORAL at 17:17

## 2017-11-04 RX ADMIN — HEPARIN SODIUM 5000 UNIT(S): 5000 INJECTION INTRAVENOUS; SUBCUTANEOUS at 06:11

## 2017-11-04 RX ADMIN — Medication 2 PUFF(S): at 12:09

## 2017-11-04 RX ADMIN — Medication 2 PUFF(S): at 21:22

## 2017-11-04 RX ADMIN — MEMANTINE HYDROCHLORIDE 5 MILLIGRAM(S): 10 TABLET ORAL at 06:11

## 2017-11-04 RX ADMIN — HEPARIN SODIUM 5000 UNIT(S): 5000 INJECTION INTRAVENOUS; SUBCUTANEOUS at 13:19

## 2017-11-04 RX ADMIN — SODIUM CHLORIDE 75 MILLILITER(S): 9 INJECTION INTRAMUSCULAR; INTRAVENOUS; SUBCUTANEOUS at 02:23

## 2017-11-04 RX ADMIN — AMLODIPINE BESYLATE 5 MILLIGRAM(S): 2.5 TABLET ORAL at 21:54

## 2017-11-04 NOTE — PROGRESS NOTE ADULT - SUBJECTIVE AND OBJECTIVE BOX
Patient is a 76y old  Female who presents with a chief complaint of Dizziness (2017 14:12)    SUBJECTIVE / OVERNIGHT EVENTS:  Patient feels much better today and as per patient's son, patient also looks better. Patient reports no SOB, chest pain, n/v, abdominal or dysuria.     MEDICATIONS  (STANDING):  aspirin enteric coated 81 milliGRAM(s) Oral daily  dextrose 5%. 1000 milliLiter(s) (50 mL/Hr) IV Continuous <Continuous>  dextrose 50% Injectable 12.5 Gram(s) IV Push once  dextrose 50% Injectable 25 Gram(s) IV Push once  dextrose 50% Injectable 25 Gram(s) IV Push once  fluticasone propionate   220 MICROgram(s) HFA Inhaler 2 Puff(s) Inhalation two times a day  heparin  Injectable 5000 Unit(s) SubCutaneous every 8 hours  influenza   Vaccine 0.5 milliLiter(s) IntraMuscular once  insulin lispro (HumaLOG) corrective regimen sliding scale   SubCutaneous three times a day before meals  insulin lispro (HumaLOG) corrective regimen sliding scale   SubCutaneous at bedtime  levothyroxine 75 MICROGram(s) Oral daily  memantine 5 milliGRAM(s) Oral two times a day    MEDICATIONS  (PRN):  dextrose Gel 1 Dose(s) Oral once PRN Blood Glucose LESS THAN 70 milliGRAM(s)/deciliter  glucagon  Injectable 1 milliGRAM(s) IntraMuscular once PRN Glucose LESS THAN 70 milligrams/deciliter      Vital Signs Last 24 Hrs  T(C): 36.6 (2017 13:54), Max: 36.8 (2017 21:16)  T(F): 97.9 (2017 13:54), Max: 98.2 (2017 21:16)  HR: 80 (2017 14:03) (73 - 80)  BP: 198/70 (2017 14:03) (170/87 - 198/70)  BP(mean): --  RR: 16 (2017 14:03) (16 - 18)  SpO2: 100% (2017 14:03) (99% - 100%)  CAPILLARY BLOOD GLUCOSE      POCT Blood Glucose.: 178 mg/dL (2017 17:21)  POCT Blood Glucose.: 124 mg/dL (2017 13:04)  POCT Blood Glucose.: 134 mg/dL (2017 09:02)  POCT Blood Glucose.: 158 mg/dL (2017 21:42)      PHYSICAL EXAM  GENERAL: Elderly woman in NAD, well-developed  EYES: EOMI, normal conjunctiva and sclera clear  NECK: Supple  CHEST/LUNG: Clear to auscultation bilaterally; No wheeze  HEART: Regular rate and rhythm; Grade 3/6 systolic murmur, radiates to clavicles; soft s1, normal s2.   ABDOMEN: Soft, Nontender, Nondistended; Bowel sounds present  EXTREMITIES:  2+ Peripheral Pulses, No clubbing, cyanosis, or edema  PSYCH: AAOx3      LABS:                        9.5    8.01  )-----------( 286      ( 2017 05:50 )             29.8     11-04    139  |  107  |  30<H>  ----------------------------<  123<H>  4.6   |  21<L>  |  1.38<H>    Ca    7.6<L>      2017 05:50  Mg     2.0     11-04    TPro  6.5  /  Alb  3.3  /  TBili  < 0.2<L>  /  DBili  x   /  AST  12  /  ALT  11  /  AlkPhos  77  11-03      CARDIAC MARKERS ( 2017 13:49 )  x     / < 0.06 ng/mL / 427 u/L / 5.27 ng/mL / x      CARDIAC MARKERS ( 2017 03:00 )  x     / < 0.06 ng/mL / 422 u/L / 5.95 ng/mL / x          Urinalysis Basic - ( 2017 22:30 )    Color: COLORL / Appearance: HAZY / S.007 / pH: 6.0  Gluc: NEGATIVE / Ketone: NEGATIVE  / Bili: NEGATIVE / Urobili: NORMAL E.U.   Blood: MODERATE / Protein: 100 / Nitrite: NEGATIVE   Leuk Esterase: LARGE / RBC: 25-50 / WBC 5-10   Sq Epi: OCC / Non Sq Epi: x / Bacteria: FEW        Care Discussed with Consultants/Other Providers: Yes

## 2017-11-04 NOTE — PROGRESS NOTE ADULT - PROBLEM SELECTOR PLAN 6
Continue Synthroid  Check TSH levels TSH 5.18  - continue home dose of levothyroxine 75mcg daily  - outpatient f/u

## 2017-11-04 NOTE — PROGRESS NOTE ADULT - ASSESSMENT
77 y/o Emirati speaking female with a PMHx of dementia, HTN, HLD, DM, CKD, hypothyroidism, and anemia presents to ED complaining of dizziness, nausea and vomiting, found to be in NIXON. Patient is a 75 yo Danish speaking [facility] with a PMHx of dementia, HTN, HLD, DM, CKD, hypothyroidism, and chronic anemia presenting with dizziness, nausea and vomiting, found to be in NIXON and relative orthostatic hypotension.

## 2017-11-04 NOTE — PROGRESS NOTE ADULT - PROBLEM SELECTOR PLAN 5
Continue basal/bolus insulin and insulin sliding scale for coverage  Hold oral hypoglycemics  Check HgA1C, FS  Diabetic diet HbA1c 8.3  - Continue basal/bolus insulin and insulin sliding scale for coverage  - Hold oral hypoglycemics    Diabetic diet

## 2017-11-04 NOTE — PROGRESS NOTE ADULT - PROBLEM SELECTOR PLAN 1
Admit to telemetry, serial EKGs, serial cardiac enzymes, orthostatics  Check CBC, CMP, TSH, FLP, HgA1C  Appreciate neurology consult  No further inpatient neuro workup; discussed with neuro, no need for MRI/MRA  Echocardiogram ordered  Monitor on telemetry to rule out cardiac etiology  Obtain UA, urine cultures and CXR given leukocytosis to R/O infectious source  PT eval ordered Telemetry reviewed - no arrhythmias noted. Noted systolic murmur on exam - cannot exclude valvular disease, such as aortic stenosis as etiology vs orthostatic hypotension. TSH mildly elevated 5.18 - unlikely cause. CT head unremarkable  No further inpatient neuro workup; discussed with neuro, no need for MRI/MRA  - continue cardiac monitoring with telemetry  - obtain TTE  - PT eval

## 2017-11-04 NOTE — PROGRESS NOTE ADULT - PROBLEM SELECTOR PLAN 2
Monitor renal function and electrolytes  S/P 1L NS in ED  Hold Lasix and Lisinopril for now  Son unsure why patient is even on Lasix (possible lower extremity edema?)  Consider renal consult, renal US  Avoid NSAIDs and nephrotoxins Improved s/p IV hydration. Suspect pre-renal etiology. UA positive for pyuria, hematuria, proteinuria, and +leukocyte esterase, however few bacteria and pt asymptomatic. Patient with known underlying CKD stage 3  - Monitor renal function and electrolytes  - d/c IV fluids  - continue to hold Lasix and Lisinopril   - Avoid NSAIDs and nephrotoxins

## 2017-11-05 DIAGNOSIS — N39.0 URINARY TRACT INFECTION, SITE NOT SPECIFIED: ICD-10-CM

## 2017-11-05 LAB
BUN SERPL-MCNC: 30 MG/DL — HIGH (ref 7–23)
CALCIUM SERPL-MCNC: 7.8 MG/DL — LOW (ref 8.4–10.5)
CHLORIDE SERPL-SCNC: 107 MMOL/L — SIGNIFICANT CHANGE UP (ref 98–107)
CO2 SERPL-SCNC: 22 MMOL/L — SIGNIFICANT CHANGE UP (ref 22–31)
CREAT SERPL-MCNC: 1.29 MG/DL — SIGNIFICANT CHANGE UP (ref 0.5–1.3)
GLUCOSE SERPL-MCNC: 177 MG/DL — HIGH (ref 70–99)
HCT VFR BLD CALC: 28.8 % — LOW (ref 34.5–45)
HGB BLD-MCNC: 9.5 G/DL — LOW (ref 11.5–15.5)
MAGNESIUM SERPL-MCNC: 1.9 MG/DL — SIGNIFICANT CHANGE UP (ref 1.6–2.6)
MCHC RBC-ENTMCNC: 29.6 PG — SIGNIFICANT CHANGE UP (ref 27–34)
MCHC RBC-ENTMCNC: 33 % — SIGNIFICANT CHANGE UP (ref 32–36)
MCV RBC AUTO: 89.7 FL — SIGNIFICANT CHANGE UP (ref 80–100)
NRBC # FLD: 0 — SIGNIFICANT CHANGE UP
PLATELET # BLD AUTO: 281 K/UL — SIGNIFICANT CHANGE UP (ref 150–400)
PMV BLD: 11.1 FL — SIGNIFICANT CHANGE UP (ref 7–13)
POTASSIUM SERPL-MCNC: 4.9 MMOL/L — SIGNIFICANT CHANGE UP (ref 3.5–5.3)
POTASSIUM SERPL-SCNC: 4.9 MMOL/L — SIGNIFICANT CHANGE UP (ref 3.5–5.3)
RBC # BLD: 3.21 M/UL — LOW (ref 3.8–5.2)
RBC # FLD: 13.4 % — SIGNIFICANT CHANGE UP (ref 10.3–14.5)
SODIUM SERPL-SCNC: 139 MMOL/L — SIGNIFICANT CHANGE UP (ref 135–145)
SPECIMEN SOURCE: SIGNIFICANT CHANGE UP
WBC # BLD: 7.46 K/UL — SIGNIFICANT CHANGE UP (ref 3.8–10.5)
WBC # FLD AUTO: 7.46 K/UL — SIGNIFICANT CHANGE UP (ref 3.8–10.5)

## 2017-11-05 PROCEDURE — 99233 SBSQ HOSP IP/OBS HIGH 50: CPT

## 2017-11-05 PROCEDURE — 93306 TTE W/DOPPLER COMPLETE: CPT | Mod: 26

## 2017-11-05 RX ORDER — CEFTRIAXONE 500 MG/1
INJECTION, POWDER, FOR SOLUTION INTRAMUSCULAR; INTRAVENOUS
Qty: 0 | Refills: 0 | Status: DISCONTINUED | OUTPATIENT
Start: 2017-11-05 | End: 2017-11-06

## 2017-11-05 RX ORDER — NIFEDIPINE 30 MG
30 TABLET, EXTENDED RELEASE 24 HR ORAL DAILY
Qty: 0 | Refills: 0 | Status: DISCONTINUED | OUTPATIENT
Start: 2017-11-05 | End: 2017-11-06

## 2017-11-05 RX ORDER — CEFTRIAXONE 500 MG/1
1 INJECTION, POWDER, FOR SOLUTION INTRAMUSCULAR; INTRAVENOUS ONCE
Qty: 0 | Refills: 0 | Status: COMPLETED | OUTPATIENT
Start: 2017-11-05 | End: 2017-11-05

## 2017-11-05 RX ADMIN — HEPARIN SODIUM 5000 UNIT(S): 5000 INJECTION INTRAVENOUS; SUBCUTANEOUS at 05:37

## 2017-11-05 RX ADMIN — MEMANTINE HYDROCHLORIDE 5 MILLIGRAM(S): 10 TABLET ORAL at 05:33

## 2017-11-05 RX ADMIN — AMLODIPINE BESYLATE 5 MILLIGRAM(S): 2.5 TABLET ORAL at 05:37

## 2017-11-05 RX ADMIN — Medication: at 18:52

## 2017-11-05 RX ADMIN — HEPARIN SODIUM 5000 UNIT(S): 5000 INJECTION INTRAVENOUS; SUBCUTANEOUS at 22:03

## 2017-11-05 RX ADMIN — Medication: at 12:40

## 2017-11-05 RX ADMIN — Medication 75 MICROGRAM(S): at 05:33

## 2017-11-05 RX ADMIN — HEPARIN SODIUM 5000 UNIT(S): 5000 INJECTION INTRAVENOUS; SUBCUTANEOUS at 13:02

## 2017-11-05 RX ADMIN — CEFTRIAXONE 100 GRAM(S): 500 INJECTION, POWDER, FOR SOLUTION INTRAMUSCULAR; INTRAVENOUS at 14:00

## 2017-11-05 RX ADMIN — Medication 2 PUFF(S): at 09:00

## 2017-11-05 RX ADMIN — MEMANTINE HYDROCHLORIDE 5 MILLIGRAM(S): 10 TABLET ORAL at 18:52

## 2017-11-05 RX ADMIN — Medication 2 PUFF(S): at 22:03

## 2017-11-05 RX ADMIN — Medication: at 10:26

## 2017-11-05 RX ADMIN — Medication 81 MILLIGRAM(S): at 12:41

## 2017-11-05 NOTE — PROGRESS NOTE ADULT - PROBLEM SELECTOR PLAN 4
Continue Lipitor  DASH diet BP better controlled. SBP ~160. Given hx of intermittent LE swelling which may be due to side effect of amlodipine and better BP control, will change to alternative antihypertensive regimen.  - d/c amlodipine. Start nifedipine 30mg daily, which can be uptitrated as tolerated.  - pt w/o systolic heart failure, therefore no indication for Lasix   - pt w/ noted proteinuria on prior UAs, but given potassium in upper limit of normal and hx of NIXON, will continue to hold Lisinopril  - Monitor BP serially  - DASH diet

## 2017-11-05 NOTE — PROGRESS NOTE ADULT - SUBJECTIVE AND OBJECTIVE BOX
Patient is a 76y old  Female who presents with a chief complaint of Dizziness (2017 14:12)    SUBJECTIVE / OVERNIGHT EVENTS:  No events overnight. BP improved with resumption of home dose amlodipine.   Telemetry reviewed - sinus rhythm, HR 60s-70s    MEDICATIONS  (STANDING):  amLODIPine   Tablet 5 milliGRAM(s) Oral daily  aspirin enteric coated 81 milliGRAM(s) Oral daily  dextrose 5%. 1000 milliLiter(s) (50 mL/Hr) IV Continuous <Continuous>  dextrose 50% Injectable 12.5 Gram(s) IV Push once  dextrose 50% Injectable 25 Gram(s) IV Push once  dextrose 50% Injectable 25 Gram(s) IV Push once  fluticasone propionate   220 MICROgram(s) HFA Inhaler 2 Puff(s) Inhalation two times a day  heparin  Injectable 5000 Unit(s) SubCutaneous every 8 hours  influenza   Vaccine 0.5 milliLiter(s) IntraMuscular once  insulin lispro (HumaLOG) corrective regimen sliding scale   SubCutaneous three times a day before meals  insulin lispro (HumaLOG) corrective regimen sliding scale   SubCutaneous at bedtime  levothyroxine 75 MICROGram(s) Oral daily  memantine 5 milliGRAM(s) Oral two times a day    MEDICATIONS  (PRN):  dextrose Gel 1 Dose(s) Oral once PRN Blood Glucose LESS THAN 70 milliGRAM(s)/deciliter  glucagon  Injectable 1 milliGRAM(s) IntraMuscular once PRN Glucose LESS THAN 70 milligrams/deciliter      Vital Signs Last 24 Hrs  T(C): 36.8 (2017 05:31), Max: 36.8 (2017 05:31)  T(F): 98.2 (2017 05:31), Max: 98.2 (2017 05:31)  HR: 72 (2017 05:31) (72 - 80)  BP: 160/73 (2017 05:31) (160/73 - 198/70)  BP(mean): --  RR: 18 (2017 05:31) (16 - 18)  SpO2: 96% (2017 05:31) (96% - 100%)  CAPILLARY BLOOD GLUCOSE      POCT Blood Glucose.: 187 mg/dL (2017 08:45)  POCT Blood Glucose.: 346 mg/dL (2017 21:25)  POCT Blood Glucose.: 178 mg/dL (2017 17:21)  POCT Blood Glucose.: 124 mg/dL (2017 13:04)        PHYSICAL EXAM  GENERAL: Elderly woman in NAD, well-developed  EYES: EOMI, normal conjunctiva and sclera clear  NECK: Supple  CHEST/LUNG: Clear to auscultation bilaterally; No wheeze  HEART: Regular rate and rhythm; Grade 3/6 systolic murmur, radiates to clavicles; soft s1, normal s2.   ABDOMEN: Soft, Nontender, Nondistended; Bowel sounds present  EXTREMITIES:  2+ Peripheral Pulses, No clubbing, cyanosis, or edema  PSYCH: AAOx3      LABS:                        9.5    7.46  )-----------( 281      ( 2017 07:43 )             28.8     11-05    139  |  107  |  30<H>  ----------------------------<  177<H>  4.9   |  22  |  1.29    Ca    7.8<L>      2017 07:43  Mg     1.9     11-05        CARDIAC MARKERS ( 2017 13:49 )  x     / < 0.06 ng/mL / 427 u/L / 5.27 ng/mL / x          Urinalysis Basic - ( 2017 22:30 )    Color: COLORL / Appearance: HAZY / S.007 / pH: 6.0  Gluc: NEGATIVE / Ketone: NEGATIVE  / Bili: NEGATIVE / Urobili: NORMAL E.U.   Blood: MODERATE / Protein: 100 / Nitrite: NEGATIVE   Leuk Esterase: LARGE / RBC: 25-50 / WBC 5-10   Sq Epi: OCC / Non Sq Epi: x / Bacteria: FEW      Care Discussed with Consultants/Other Providers: Yes Patient is a 76y old  Female who presents with a chief complaint of Dizziness (2017 14:12)    SUBJECTIVE / OVERNIGHT EVENTS:  No events overnight. BP improved with resumption of home dose amlodipine. However, as per patient's son, Hernandez Paz, who was present at bedside, pt with intermittent LE swelling at home. Patient now complaining of lower abdominal pain and burning with urination.  Telemetry reviewed - sinus rhythm, HR 60s-70s    MEDICATIONS  (STANDING):  amLODIPine   Tablet 5 milliGRAM(s) Oral daily  aspirin enteric coated 81 milliGRAM(s) Oral daily  dextrose 5%. 1000 milliLiter(s) (50 mL/Hr) IV Continuous <Continuous>  dextrose 50% Injectable 12.5 Gram(s) IV Push once  dextrose 50% Injectable 25 Gram(s) IV Push once  dextrose 50% Injectable 25 Gram(s) IV Push once  fluticasone propionate   220 MICROgram(s) HFA Inhaler 2 Puff(s) Inhalation two times a day  heparin  Injectable 5000 Unit(s) SubCutaneous every 8 hours  influenza   Vaccine 0.5 milliLiter(s) IntraMuscular once  insulin lispro (HumaLOG) corrective regimen sliding scale   SubCutaneous three times a day before meals  insulin lispro (HumaLOG) corrective regimen sliding scale   SubCutaneous at bedtime  levothyroxine 75 MICROGram(s) Oral daily  memantine 5 milliGRAM(s) Oral two times a day    MEDICATIONS  (PRN):  dextrose Gel 1 Dose(s) Oral once PRN Blood Glucose LESS THAN 70 milliGRAM(s)/deciliter  glucagon  Injectable 1 milliGRAM(s) IntraMuscular once PRN Glucose LESS THAN 70 milligrams/deciliter      Vital Signs Last 24 Hrs  T(C): 36.8 (2017 05:31), Max: 36.8 (2017 05:31)  T(F): 98.2 (2017 05:31), Max: 98.2 (2017 05:31)  HR: 72 (2017 05:31) (72 - 80)  BP: 160/73 (2017 05:31) (160/73 - 198/70)  BP(mean): --  RR: 18 (2017 05:31) (16 - 18)  SpO2: 96% (2017 05:31) (96% - 100%)  CAPILLARY BLOOD GLUCOSE      POCT Blood Glucose.: 187 mg/dL (2017 08:45)  POCT Blood Glucose.: 346 mg/dL (2017 21:25)  POCT Blood Glucose.: 178 mg/dL (2017 17:21)  POCT Blood Glucose.: 124 mg/dL (2017 13:04)        PHYSICAL EXAM  GENERAL: Elderly woman in NAD, well-developed  EYES: EOMI, normal conjunctiva and sclera clear  NECK: Supple  CHEST/LUNG: Clear to auscultation bilaterally; No wheeze  HEART: Regular rate and rhythm; Grade 3/6 systolic murmur, radiates to clavicles; soft s1, normal s2.   ABDOMEN: Soft, Nontender, Nondistended; Bowel sounds present  EXTREMITIES:  2+ Peripheral Pulses, No clubbing, cyanosis, or edema  PSYCH: AAOx3      LABS:                        9.5    7.46  )-----------( 281      ( 2017 07:43 )             28.8         139  |  107  |  30<H>  ----------------------------<  177<H>  4.9   |  22  |  1.29    Ca    7.8<L>      2017 07:43  Mg     1.9             CARDIAC MARKERS ( 2017 13:49 )  x     / < 0.06 ng/mL / 427 u/L / 5.27 ng/mL / x          Urinalysis Basic - ( 2017 22:30 )    Color: COLORL / Appearance: HAZY / S.007 / pH: 6.0  Gluc: NEGATIVE / Ketone: NEGATIVE  / Bili: NEGATIVE / Urobili: NORMAL E.U.   Blood: MODERATE / Protein: 100 / Nitrite: NEGATIVE   Leuk Esterase: LARGE / RBC: 25-50 / WBC 5-10   Sq Epi: OCC / Non Sq Epi: x / Bacteria: FEW    RECENT CULTURES:  17  Urine culture: E. coli >100K CFU/ML        17: Transthoracic echocardiogram with 2-D, M-Mode  and complete spectral and color flow Doppler.  INDICATION: Syncope and collapse (R55)  ------------------------------------------------------------------------  DIMENSIONS:  Dimensions:     Normal Values:  LA:     4.2 cm    2.0 - 4.0 cm  Ao:     3.0 cm    2.0 - 3.8 cm  SEPTUM: 0.7 cm    0.6 - 1.2 cm  PWT:    0.7 cm    0.6 - 1.1 cm  LVIDd:  4.3 cm    3.0 - 5.6 cm  LVIDs:  3.2 cm    1.8 - 4.0 cm  Derived Variables:  LVMI: 53 g/m2  RWT: 0.32  Fractional short: 26 %  Ejection Fraction (Teicholtz): 51 %  ------------------------------------------------------------------------  OBSERVATIONS:  Mitral Valve: Normal mitral valve. Minimal mitral  regurgitation.  Aortic Root: Normal aortic root.  Aortic Valve: Aortic valve not well visualized; appears  calcified.  Left Atrium: Normal left atrium.  LA volume index = 30  cc/m2.  Left Ventricle: Endocardium not well visualized; grossly  normal left ventricular systolic function. Normal left  ventricular internal dimensions and wall thicknesses. Mild  diastolic dysfunction (Stage I).  Right Heart: Normal right atrium. Normal right ventricular  size and function. Normal tricuspid valve. Minimal  tricuspid regurgitation. Normal pulmonic valve.  Pericardium/PleuraSmall pericardial effusion.  ------------------------------------------------------------------------  CONCLUSIONS:  1. Aortic valve not well visualized; appears calcified.  2. Endocardium not well visualized; grossly normal left  ventricular systolic function.  3. Mild diastolic dysfunction (Stage I).  4. Normal right ventricular size and function.      Care Discussed with Consultants/Other Providers: Yes

## 2017-11-05 NOTE — PROGRESS NOTE ADULT - PROBLEM SELECTOR PLAN 5
HbA1c 8.3  - Continue basal/bolus insulin and insulin sliding scale for coverage  - Hold oral hypoglycemics    Diabetic diet Continue Lipitor  DASH diet

## 2017-11-05 NOTE — PROGRESS NOTE ADULT - PROBLEM SELECTOR PLAN 2
Improved s/p IV hydration. Suspect pre-renal etiology. UA positive for pyuria, hematuria, proteinuria, and +leukocyte esterase, however few bacteria and pt asymptomatic. Patient with known underlying CKD stage 3  - Monitor renal function and electrolytes  - continue to hold Lasix and Lisinopril   - Avoid NSAIDs and nephrotoxins Improved s/p IV hydration. Suspect pre-renal etiology. UA positive for pyuria, hematuria, proteinuria, and +leukocyte esterase, and few bacteria with urine cx growing E. coli >100K cfu/mL. Patient with known underlying CKD stage 3  - Monitor renal function and electrolytes  - will treat for UTI  - continue to hold Lasix and Lisinopril   - Avoid NSAIDs and nephrotoxins

## 2017-11-05 NOTE — PROGRESS NOTE ADULT - PROBLEM SELECTOR PLAN 6
TSH 5.18  - continue home dose of levothyroxine 75mcg daily  - outpatient f/u HbA1c 8.3  - Continue basal/bolus insulin and insulin sliding scale for coverage  - Hold oral hypoglycemics    Diabetic diet

## 2017-11-05 NOTE — PROGRESS NOTE ADULT - PROBLEM SELECTOR PLAN 7
Hb/Hct stable. No signs of active bleeding  - outpatient f/u TSH 5.18  - continue home dose of levothyroxine 75mcg daily  - outpatient f/u

## 2017-11-05 NOTE — PROGRESS NOTE ADULT - PROBLEM SELECTOR PLAN 1
Telemetry reviewed - no arrhythmias noted. Noted systolic murmur on exam - cannot exclude valvular disease, such as aortic stenosis as etiology vs orthostatic hypotension. TSH mildly elevated 5.18 - unlikely cause. CT head unremarkable  No further inpatient neuro workup; discussed with neuro, no need for MRI/MRA  - continue cardiac monitoring with telemetry  - pending TTE  - awaiting PT eval Subjectively improved. Telemetry reviewed - no arrhythmias noted. Noted systolic murmur on exam. However, Echo done today - findings suggestive of aortic sclerosis NOT aortic stenosis, EF 51%, and mild diastolic dysfunction. TSH mildly elevated 5.18 - unlikely cause. CT head unremarkable  No further inpatient neuro workup; as per neuro, no need for MRI/MRA  Dizziness maybe 2/2 BPPV.  - continue cardiac monitoring with telemetry  - awaiting PT eval  - can consider meclizine if pt still symptomatic

## 2017-11-05 NOTE — PROGRESS NOTE ADULT - ASSESSMENT
Patient is a 75 yo [place]ian speaking woman from [facility] with a PMHx of dementia, HTN, HLD, DM, CKD, hypothyroidism, and chronic anemia presenting with dizziness, nausea and vomiting, found to be in NIXON and relative orthostatic hypotension. Patient is a 77 yo [place]ian speaking woman from [facility] with a PMHx of dementia, HTN, HLD, DM, CKD, hypothyroidism, and chronic anemia presenting with dizziness, nausea and vomiting, found to be in NIXON and relative orthostatic hypotension, now with confirmed symptomatic E.coli UTI.

## 2017-11-06 DIAGNOSIS — K59.01 SLOW TRANSIT CONSTIPATION: ICD-10-CM

## 2017-11-06 LAB
-  AMIKACIN: SIGNIFICANT CHANGE UP
-  AMPICILLIN/SULBACTAM: SIGNIFICANT CHANGE UP
-  AMPICILLIN: SIGNIFICANT CHANGE UP
-  AZTREONAM: SIGNIFICANT CHANGE UP
-  CEFAZOLIN: SIGNIFICANT CHANGE UP
-  CEFEPIME: SIGNIFICANT CHANGE UP
-  CEFOXITIN: SIGNIFICANT CHANGE UP
-  CEFTAZIDIME: SIGNIFICANT CHANGE UP
-  CIPROFLOXACIN: SIGNIFICANT CHANGE UP
-  ERTAPENEM: SIGNIFICANT CHANGE UP
-  GENTAMICIN: SIGNIFICANT CHANGE UP
-  IMIPENEM: SIGNIFICANT CHANGE UP
-  LEVOFLOXACIN: SIGNIFICANT CHANGE UP
-  MEROPENEM: SIGNIFICANT CHANGE UP
-  NITROFURANTOIN: SIGNIFICANT CHANGE UP
-  PIPERACILLIN/TAZOBACTAM: SIGNIFICANT CHANGE UP
-  TIGECYCLINE: SIGNIFICANT CHANGE UP
-  TOBRAMYCIN: SIGNIFICANT CHANGE UP
-  TRIMETHOPRIM/SULFAMETHOXAZOLE: SIGNIFICANT CHANGE UP
BACTERIA UR CULT: SIGNIFICANT CHANGE UP
BUN SERPL-MCNC: 28 MG/DL — HIGH (ref 7–23)
CALCIUM SERPL-MCNC: 8 MG/DL — LOW (ref 8.4–10.5)
CHLORIDE SERPL-SCNC: 105 MMOL/L — SIGNIFICANT CHANGE UP (ref 98–107)
CO2 SERPL-SCNC: 24 MMOL/L — SIGNIFICANT CHANGE UP (ref 22–31)
CREAT SERPL-MCNC: 1.41 MG/DL — HIGH (ref 0.5–1.3)
GLUCOSE SERPL-MCNC: 170 MG/DL — HIGH (ref 70–99)
HCT VFR BLD CALC: 29.6 % — LOW (ref 34.5–45)
HGB BLD-MCNC: 9.7 G/DL — LOW (ref 11.5–15.5)
MAGNESIUM SERPL-MCNC: 2.1 MG/DL — SIGNIFICANT CHANGE UP (ref 1.6–2.6)
MCHC RBC-ENTMCNC: 29.2 PG — SIGNIFICANT CHANGE UP (ref 27–34)
MCHC RBC-ENTMCNC: 32.8 % — SIGNIFICANT CHANGE UP (ref 32–36)
MCV RBC AUTO: 89.2 FL — SIGNIFICANT CHANGE UP (ref 80–100)
METHOD TYPE: SIGNIFICANT CHANGE UP
NRBC # FLD: 0 — SIGNIFICANT CHANGE UP
ORGANISM # SPEC MICROSCOPIC CNT: SIGNIFICANT CHANGE UP
PLATELET # BLD AUTO: 306 K/UL — SIGNIFICANT CHANGE UP (ref 150–400)
PMV BLD: 11.5 FL — SIGNIFICANT CHANGE UP (ref 7–13)
POTASSIUM SERPL-MCNC: 4.7 MMOL/L — SIGNIFICANT CHANGE UP (ref 3.5–5.3)
POTASSIUM SERPL-SCNC: 4.7 MMOL/L — SIGNIFICANT CHANGE UP (ref 3.5–5.3)
RBC # BLD: 3.32 M/UL — LOW (ref 3.8–5.2)
RBC # FLD: 13.6 % — SIGNIFICANT CHANGE UP (ref 10.3–14.5)
SODIUM SERPL-SCNC: 139 MMOL/L — SIGNIFICANT CHANGE UP (ref 135–145)
WBC # BLD: 8.19 K/UL — SIGNIFICANT CHANGE UP (ref 3.8–10.5)
WBC # FLD AUTO: 8.19 K/UL — SIGNIFICANT CHANGE UP (ref 3.8–10.5)

## 2017-11-06 PROCEDURE — 74022 RADEX COMPL AQT ABD SERIES: CPT | Mod: 26

## 2017-11-06 PROCEDURE — 99233 SBSQ HOSP IP/OBS HIGH 50: CPT

## 2017-11-06 RX ORDER — NIFEDIPINE 30 MG
30 TABLET, EXTENDED RELEASE 24 HR ORAL ONCE
Qty: 0 | Refills: 0 | Status: COMPLETED | OUTPATIENT
Start: 2017-11-06 | End: 2017-11-06

## 2017-11-06 RX ORDER — ERTAPENEM SODIUM 1 G/1
INJECTION, POWDER, LYOPHILIZED, FOR SOLUTION INTRAMUSCULAR; INTRAVENOUS
Qty: 0 | Refills: 0 | Status: DISCONTINUED | OUTPATIENT
Start: 2017-11-06 | End: 2017-11-09

## 2017-11-06 RX ORDER — NIFEDIPINE 30 MG
60 TABLET, EXTENDED RELEASE 24 HR ORAL DAILY
Qty: 0 | Refills: 0 | Status: DISCONTINUED | OUTPATIENT
Start: 2017-11-07 | End: 2017-11-09

## 2017-11-06 RX ORDER — ERTAPENEM SODIUM 1 G/1
1000 INJECTION, POWDER, LYOPHILIZED, FOR SOLUTION INTRAMUSCULAR; INTRAVENOUS ONCE
Qty: 0 | Refills: 0 | Status: COMPLETED | OUTPATIENT
Start: 2017-11-06 | End: 2017-11-06

## 2017-11-06 RX ADMIN — Medication: at 17:36

## 2017-11-06 RX ADMIN — Medication 75 MICROGRAM(S): at 05:48

## 2017-11-06 RX ADMIN — Medication: at 09:43

## 2017-11-06 RX ADMIN — Medication 2 PUFF(S): at 09:43

## 2017-11-06 RX ADMIN — HEPARIN SODIUM 5000 UNIT(S): 5000 INJECTION INTRAVENOUS; SUBCUTANEOUS at 05:48

## 2017-11-06 RX ADMIN — Medication 81 MILLIGRAM(S): at 12:34

## 2017-11-06 RX ADMIN — MEMANTINE HYDROCHLORIDE 5 MILLIGRAM(S): 10 TABLET ORAL at 17:36

## 2017-11-06 RX ADMIN — ERTAPENEM SODIUM 120 MILLIGRAM(S): 1 INJECTION, POWDER, LYOPHILIZED, FOR SOLUTION INTRAMUSCULAR; INTRAVENOUS at 17:36

## 2017-11-06 RX ADMIN — MEMANTINE HYDROCHLORIDE 5 MILLIGRAM(S): 10 TABLET ORAL at 05:48

## 2017-11-06 RX ADMIN — HEPARIN SODIUM 5000 UNIT(S): 5000 INJECTION INTRAVENOUS; SUBCUTANEOUS at 21:18

## 2017-11-06 RX ADMIN — Medication 30 MILLIGRAM(S): at 17:36

## 2017-11-06 RX ADMIN — HEPARIN SODIUM 5000 UNIT(S): 5000 INJECTION INTRAVENOUS; SUBCUTANEOUS at 12:34

## 2017-11-06 RX ADMIN — Medication 2 PUFF(S): at 21:17

## 2017-11-06 RX ADMIN — Medication: at 12:34

## 2017-11-06 RX ADMIN — Medication 30 MILLIGRAM(S): at 05:48

## 2017-11-06 NOTE — PROGRESS NOTE ADULT - PROBLEM SELECTOR PLAN 3
Subjectively improved. Telemetry reviewed - no arrhythmias noted. Noted systolic murmur on exam. However, Echo done- findings suggestive of aortic sclerosis NOT aortic stenosis, EF 51%, and mild diastolic dysfunction. TSH mildly elevated 5.18 - unlikely cause. CT head unremarkable  No further inpatient neuro workup; as per neuro, no need for MRI/MRA  Dizziness maybe 2/2 BPPV/vasovagal episode--now improved  - continue cardiac monitoring with telemetry  - awaiting PT eval  - can consider meclizine if pt still symptomatic  -repeat orthostatics in AM

## 2017-11-06 NOTE — CONSULT NOTE ADULT - ASSESSMENT
75 y/o Wallisian speaking female with a PMHx of dementia, HTN, HLD, DM, CKD, hypothyroidism, and anemia presents to ED complaining of dizziness, nausea and vomiting, found to be in NIXON. She was also found to have a persistent LLL opacity for which CT chest ordered and showed focal patchy opacities in the left lingula and left lower lobe with nodular components which were unchanged compared to prior exam. 75 y/o Wallisian speaking female with a PMHx of HTN, HLD, DM, CKD, hypothyroidism, and anemia presents to ED complaining of dizziness, nausea and vomiting, found to be in NIXON.        +orthostatics --> rebolus 2nd liter, hold bp meds, f/u orthostatics, f/u tele  other issues as noted above  persistent LLL opacity - d/w pt/family --> check noncontrast chest CT  Pt's UA showed (+) LE, WBC 5-10.  Urine cultures are growing >100K ESBL e.coli.   She was initially started on rocephin for UTI.  ID consult was called for further antibiotic management.      Recommend:    - Agree with ertapenem for esbl uti.  Anticipate short 3 day course for uncomplicated UTI    - Monitor renal function.  If CrCl < 30, switch to Erta 500mg IV qdaily    Glo Rodriguez  [phone]

## 2017-11-06 NOTE — PROGRESS NOTE ADULT - SUBJECTIVE AND OBJECTIVE BOX
Patient is a 76y old  Female who presents with a chief complaint of Dizziness (03 Nov 2017 14:12)      SUBJECTIVE / OVERNIGHT EVENTS: Pt feels better no N/V afebrile +constipated BP elevated     MEDICATIONS  (STANDING):  aspirin enteric coated 81 milliGRAM(s) Oral daily  dextrose 5%. 1000 milliLiter(s) (50 mL/Hr) IV Continuous <Continuous>  dextrose 50% Injectable 12.5 Gram(s) IV Push once  dextrose 50% Injectable 25 Gram(s) IV Push once  dextrose 50% Injectable 25 Gram(s) IV Push once  ertapenem  IVPB      fluticasone propionate   220 MICROgram(s) HFA Inhaler 2 Puff(s) Inhalation two times a day  heparin  Injectable 5000 Unit(s) SubCutaneous every 8 hours  influenza   Vaccine 0.5 milliLiter(s) IntraMuscular once  insulin lispro (HumaLOG) corrective regimen sliding scale   SubCutaneous three times a day before meals  insulin lispro (HumaLOG) corrective regimen sliding scale   SubCutaneous at bedtime  levothyroxine 75 MICROGram(s) Oral daily  memantine 5 milliGRAM(s) Oral two times a day  NIFEdipine XL 30 milliGRAM(s) Oral daily    MEDICATIONS  (PRN):  bisacodyl Suppository 10 milliGRAM(s) Rectal once PRN Constipation  dextrose Gel 1 Dose(s) Oral once PRN Blood Glucose LESS THAN 70 milliGRAM(s)/deciliter  glucagon  Injectable 1 milliGRAM(s) IntraMuscular once PRN Glucose LESS THAN 70 milligrams/deciliter        CAPILLARY BLOOD GLUCOSE      POCT Blood Glucose.: 260 mg/dL (06 Nov 2017 12:24)  POCT Blood Glucose.: 166 mg/dL (06 Nov 2017 08:24)  POCT Blood Glucose.: 162 mg/dL (05 Nov 2017 22:13)  POCT Blood Glucose.: 174 mg/dL (05 Nov 2017 17:30)    I&O's Summary      T(C): 36.8 (11-06-17 @ 05:43), Max: 36.8 (11-06-17 @ 05:43)  HR: 70 (11-06-17 @ 07:25) (70 - 73)  BP: 168/77 (11-06-17 @ 07:25) (168/77 - 197/87)  RR: 18 (11-06-17 @ 05:43) (18 - 18)  SpO2: 99% (11-06-17 @ 05:43) (99% - 99%)    PHYSICAL EXAM:  GENERAL: NAD, well-developed  HEAD:  Atraumatic, Normocephalic  EYES: EOMI, PERRLA, conjunctiva and sclera clear  NECK: Supple, No JVD  CHEST/LUNG: Clear to auscultation bilaterally; No wheeze  HEART: Regular rate and rhythm; No murmurs, rubs, or gallops  ABDOMEN: Soft, Bowel sounds present  EXTREMITIES:  2+ Peripheral Pulses, No clubbing, cyanosis, or edema      LABS:                        9.7    8.19  )-----------( 306      ( 06 Nov 2017 06:30 )             29.6     11-06    139  |  105  |  28<H>  ----------------------------<  170<H>  4.7   |  24  |  1.41<H>    Ca    8.0<L>      06 Nov 2017 06:30  Mg     2.1     11-06          Culture - Urine (11.04.17 @ 02:16)    -  Amikacin: S <=16 TATIANNA    -  Ampicillin: R >16 TATIANNA    -  Ampicillin/Sulbactam: R >16/8 TATIANNA    -  Aztreonam: R >16 TATIANNA    -  Cefazolin: R >16 TATIANNA    -  Cefepime: R >16 TATIANNA    -  Cefoxitin: S <=8 TATIANNA    -  Ceftazidime: R >16 TATIANNA    -  Ciprofloxacin: R >2 TATIANNA    -  Ertapenem: S <=1 TATIANNA    -  Gentamicin: R >8 TATIANNA    -  Imipenem: S <=1 TATIANNA    -  Levofloxacin: R >4 TATIANNA    -  Meropenem: S <=1 TATIANNA    -  Nitrofurantoin: S <=32 TATIANNA    -  Piperacillin/Tazobactam: R <=16 TATIANNA    -  Tigecycline: S <=2 TATIANNA    -  Tobramycin: I 8 TATIANNA    -  Trimethoprim/Sulfamethoxazole: R >2/38 TATIANNA    Culture - Urine:   COLONY COUNT: > = 100,000 CFU/ML    Specimen Source: URINE MIDSTREAM    Organism Identification: E.COLI ESBL POSITIVE    Organism: E.COLI ESBL POSITIVE    Method Type: MICROSCAN NEG URINE COMBO 61            RADIOLOGY & ADDITIONAL TESTS:    Imaging Personally Reviewed:    Consultant(s) Notes Reviewed:      Care Discussed with Consultants/Other Providers:

## 2017-11-06 NOTE — CONSULT NOTE ADULT - SUBJECTIVE AND OBJECTIVE BOX
Patient is a 76y old  Female who presents with a chief complaint of Dizziness (03 Nov 2017 14:12)      HPI:    (Obtained from chart notes)  75 y/o Afghan speaking female with a PMHx of dementia, HTN, HLD, DM, CKD, hypothyroidism, and anemia presents to ED complaining of dizziness since last night. Pt is Afghan speaking and  services offered but son at bedside would like to translate for patient. Pt is a relatively independent female who ambulates with cane (intermittently); family is involved for help and pt does not have a HHA or VN. Last night at around 10:00PM when pt was in the bathroom on the toilet bowl, she started to experience sudden onset of dizziness, described as "the room spinning." Pt tried to get up on her feet but her legs felt too weak and she was unable to get up off the toilet bowl so she sat back down. During this time, pt felt nauseous and had one episode of non-bloody and non-bilious vomiting, associated with one episode of non-bloody diarrhea. Pt called to her daughter who was able to help her up and bring her back to the bedroom. Pt has continued to experience dizziness, worse with exertion and better with rest. Her symptoms have improved but are still there. Patient's finger stick was apparently 180 during this time as per son. Pt denies fever, chills, headache, visual deficits, chest pain, shortness of breath, palpitations, abdominal pain, constipation, hematochezia, melena, dysuria, hematuria, LOC, syncope, fall, seizures, convulsions, hemiparesis, paresthesias.    Upon arrival to ED, EKG: NSR with peaked T waves V2-V4. CT head: No acute intracranial hemorrhage, large cortical infarct or mass effect, given the extent of artifact. No significant interval change since 7/20/2017. WBC: 12.54. H/H: 10.0/30.6. BUN/Cr: 46/2.09. Glucose: 285. Pt was given 1L NS and admitted to telemetry. (03 Nov 2017 14:12)  Pt was found to have NIXON on admission.      75 y/o Afghan speaking female with a PMHx of dementia, HTN, HLD, DM, CKD, hypothyroidism, and anemia presents to ED complaining of dizziness, nausea and vomiting, found to be in NIXON. She was also found to have a persistent LLL opacity for which CT chest ordered and showed focal patchy opacities in the left lingula and left lower lobe with nodular components which were unchanged compared to prior exam. 75 y/o Afghan speaking female with a PMHx of HTN, HLD, DM, CKD, hypothyroidism, and anemia presents to ED complaining of dizziness, nausea and vomiting, found to be in NIXON.        +orthostatics --> rebolus 2nd liter, hold bp meds, f/u orthostatics, f/u tele  other issues as noted above  persistent LLL opacity - d/w pt/family --> check noncontrast chest CT  Pt's UA showed (+) LE, WBC 5-10.  Urine cultures are growing >100K ESBL e.coli.   She was initially started on rocephin for UTI.  ID consult was called for further antibiotic management.        REVIEW OF SYSTEMS:    CONSTITUTIONAL: No fever, weight loss, or fatigue  EYES: No eye pain, visual disturbances, or discharge  ENMT:  No sore throat  NECK: No pain or stiffness  RESPIRATORY: No cough, wheezing, chills or hemoptysis; No shortness of breath  CARDIOVASCULAR: No chest pain, palpitations, dizziness, or leg swelling  GASTROINTESTINAL: No abdominal or epigastric pain. No nausea, vomiting, or hematemesis; No diarrhea or constipation. No melena or hematochezia.  GENITOURINARY: No dysuria, frequency, hematuria, or incontinence  NEUROLOGICAL: No headaches, memory loss, loss of strength, numbness, or tremors  SKIN: No itching, burning, rashes, or lesions   LYMPH NODES: No enlarged glands  MUSCULOSKELETAL: No joint pain or swelling; No muscle, back, or extremity pain      PAST MEDICAL & SURGICAL HISTORY:  Dementia without behavioral disturbance, unspecified dementia type  Hypothyroidism  Anemia  CKD (chronic kidney disease)  DM (diabetes mellitus)  HLD (hyperlipidemia)  HTN (hypertension)  No significant past surgical history      Allergies    No Known Allergies    Intolerances        FAMILY HISTORY:  No pertinent family history in first degree relatives      SOCIAL HISTORY:    . Lives with . Independent with ambulation but requires some assistance with ADLs. No HHA or VN services. Denies etoh/smoking/substance abuse      MEDICATIONS  (STANDING):  aspirin enteric coated 81 milliGRAM(s) Oral daily  dextrose 5%. 1000 milliLiter(s) (50 mL/Hr) IV Continuous <Continuous>  dextrose 50% Injectable 12.5 Gram(s) IV Push once  dextrose 50% Injectable 25 Gram(s) IV Push once  dextrose 50% Injectable 25 Gram(s) IV Push once  ertapenem  IVPB      fluticasone propionate   220 MICROgram(s) HFA Inhaler 2 Puff(s) Inhalation two times a day  heparin  Injectable 5000 Unit(s) SubCutaneous every 8 hours  influenza   Vaccine 0.5 milliLiter(s) IntraMuscular once  insulin lispro (HumaLOG) corrective regimen sliding scale   SubCutaneous three times a day before meals  insulin lispro (HumaLOG) corrective regimen sliding scale   SubCutaneous at bedtime  levothyroxine 75 MICROGram(s) Oral daily  memantine 5 milliGRAM(s) Oral two times a day  NIFEdipine XL 30 milliGRAM(s) Oral once    MEDICATIONS  (PRN):  bisacodyl Suppository 10 milliGRAM(s) Rectal once PRN Constipation  dextrose Gel 1 Dose(s) Oral once PRN Blood Glucose LESS THAN 70 milliGRAM(s)/deciliter  glucagon  Injectable 1 milliGRAM(s) IntraMuscular once PRN Glucose LESS THAN 70 milligrams/deciliter      Vital Signs Last 24 Hrs  T(C): 36.8 (06 Nov 2017 05:43), Max: 36.8 (06 Nov 2017 05:43)  T(F): 98.3 (06 Nov 2017 05:43), Max: 98.3 (06 Nov 2017 05:43)  HR: 70 (06 Nov 2017 07:25) (70 - 73)  BP: 168/77 (06 Nov 2017 07:25) (168/77 - 197/87)  BP(mean): --  RR: 18 (06 Nov 2017 05:43) (18 - 18)  SpO2: 99% (06 Nov 2017 05:43) (99% - 99%)    PHYSICAL EXAM:    GENERAL: NAD, well-groomed, well-developed  HEAD:  Atraumatic, Normocephalic  EYES: EOMI, PERRLA, conjunctiva and sclera clear  ENMT: No tonsillar erythema, exudates, or enlargement; Moist mucous membranes, Good dentition, No lesions  NECK: Supple, No JVD, Normal thyroid  NERVOUS SYSTEM:  Alert & Oriented X3, Good concentration; Motor Strength 5/5 B/L upper and lower extremities; DTRs 2+ intact and symmetric  CHEST/LUNG: Clear to percussion bilaterally; No rales, rhonchi, wheezing, or rubs  HEART: Regular rate and rhythm; No murmurs, rubs, or gallops  ABDOMEN: Soft, Nontender, Nondistended; Bowel sounds present  EXTREMITIES:  2+ Peripheral Pulses, No clubbing, cyanosis, or edema  LYMPH: No lymphadenopathy noted  SKIN: No rashes or lesions    LABS:  CBC Full  -  ( 06 Nov 2017 06:30 )  WBC Count : 8.19 K/uL  Hemoglobin : 9.7 g/dL  Hematocrit : 29.6 %  Platelet Count - Automated : 306 K/uL  Mean Cell Volume : 89.2 fL  Mean Cell Hemoglobin : 29.2 pg  Mean Cell Hemoglobin Concentration : 32.8 %  Auto Neutrophil # : x  Auto Lymphocyte # : x  Auto Monocyte # : x  Auto Eosinophil # : x  Auto Basophil # : x  Auto Neutrophil % : x  Auto Lymphocyte % : x  Auto Monocyte % : x  Auto Eosinophil % : x  Auto Basophil % : x      11-06    139  |  105  |  28<H>  ----------------------------<  170<H>  4.7   |  24  |  1.41<H>    Ca    8.0<L>      06 Nov 2017 06:30  Mg     2.1     11-06      MICROBIOLOGY:    Culture - Urine (11.04.17 @ 02:16)    -  Amikacin: S <=16 TATIANNA    -  Ampicillin: R >16 TATIANNA    -  Ampicillin/Sulbactam: R >16/8 TATIANNA    -  Aztreonam: R >16 TATIANNA    -  Cefazolin: R >16 TATIANNA    -  Cefepime: R >16 TATIANNA    -  Cefoxitin: S <=8 TATIANNA    -  Ceftazidime: R >16 TATIANNA    -  Ciprofloxacin: R >2 TATIANNA    -  Ertapenem: S <=1 TATIANNA    -  Gentamicin: R >8 TATIANNA    -  Imipenem: S <=1 TATIANNA    -  Levofloxacin: R >4 TATIANNA    -  Meropenem: S <=1 TATIANNA    -  Nitrofurantoin: S <=32 TATIANNA    -  Piperacillin/Tazobactam: R <=16 TATIANNA    -  Tigecycline: S <=2 TATIANNA    -  Tobramycin: I 8 TATIANNA    -  Trimethoprim/Sulfamethoxazole: R >2/38 TATIANNA    Culture - Urine:   COLONY COUNT: > = 100,000 CFU/ML    Culture - Urine:   RESULT CALLED TO / READ BACK:KEVIN BASILIO RN/Y  DATE / TIME CALLED: 11/06/17 1526  CALLED BY: PACO CUTLER    Specimen Source: URINE MIDSTREAM    Organism Identification: E.COLI ESBL POSITIVE    Organism: E.COLI ESBL POSITIVE  COLONY COUNT: > = 100,000 CFU/ML    Method Type: MICROSCAN NEG URINE COMBO 61            RADIOLOGY:      < from: CT Chest No Cont (11.03.17 @ 18:35) >    FINDINGS:    CHEST:     LUNGS AND LARGE AIRWAYS: Patent central airways.  4 mm right upper lobe   pulmonary nodule (series 3, image 84). Focal patchy opacities in the left   lingula and left lower lobe with nodular components are grossly unchanged   compared to prior exam. Right basilar linear atelectasis.  PLEURA: No pleural effusion. No pneumothorax.  VESSELS: Atherosclerotic calcification of the aorta.  HEART: Heart size is normal. Small pericardial effusion. Aortic valvular   calcifications.  MEDIASTINUM AND AKASH: No lymphadenopathy.  CHEST WALL AND LOWER NECK: 1.1 cm right thyroid nodule is unchanged.  VISUALIZED UPPER ABDOMEN: Calcified granuloma in the spleen.  BONES: Within normal limits.    IMPRESSION:     Focal patchy opacities in the left lingula and left lower lobe with   nodular components are grossly unchanged compared to prior exam.  Small pericardial effusion.    < end of copied text >      < from: Xray Chest 1 View AP-PORTABLE IMMEDIATE (11.03.17 @ 14:49) >  FINDINGS:     Cardiac silhouette normal in size. Unchanged left lower lobe linear   opacity. Lungs otherwise clear.. No pleural effusion or pneumothorax.    IMPRESSION:   Unchanged left lower lobe linear opacity. Lungs otherwise clear..     < end of copied text >      < from: CT Head No Cont (11.03.17 @ 05:35) >  INTERPRETATION:  Clinical indication: Dizziness.    Technique: CT axial images of the head were obtained without intravenous   contrast. Computer-reconstructed coronal and sagittal images were   obtained.    Comparison:  MRI 7/23/2017. CT 7/20/2017.    Findings: Patient motion degrades images. There is no obvious acute   intracranial hemorrhage, large cortical infarct, mass effect or midline   shift, given the extent of artifact. Nonspecific mild to moderate   periventricular and subcortical white matter lucencies likely represent   chronic microvascular ischemic changes. Cortical sulci and ventricles are   appropriate for the patient's stated age. Bilateral basal ganglia   calcifications.    There is no depressed skull fracture. There is minimal mucosal thickening   with a right maxillary sinus mucous cyst. The tympanomastoid region is   clear. Again noted is an old fracture of the left medial orbital   walls/lamina papyracea with herniation of fat. There is no significant   interval change.     Impression:     No acute intracranial hemorrhage, large cortical infarct or mass effect,   given the extent of artifact. No significant interval change since   7/20/2017. If clinically indicated, a short-term follow-up or an MRI may   be obtained for further evaluation.    < end of copied text > Patient is a 76y old  Female who presents with a chief complaint of Dizziness (03 Nov 2017 14:12)      HPI:    (Obtained from chart notes)  77 y/o Uzbek speaking female with a PMHx of dementia, HTN, HLD, DM, CKD, hypothyroidism, and anemia presents to ED complaining of dizziness since last night. Pt is Uzbek speaking and  services offered but son at bedside would like to translate for patient. Pt is a relatively independent female who ambulates with cane (intermittently); family is involved for help and pt does not have a HHA or VN. Last night at around 10:00PM when pt was in the bathroom on the toilet bowl, she started to experience sudden onset of dizziness, described as "the room spinning." Pt tried to get up on her feet but her legs felt too weak and she was unable to get up off the toilet bowl so she sat back down. During this time, pt felt nauseous and had one episode of non-bloody and non-bilious vomiting, associated with one episode of non-bloody diarrhea. Pt called to her daughter who was able to help her up and bring her back to the bedroom. Pt has continued to experience dizziness, worse with exertion and better with rest. Her symptoms have improved but are still there. Patient's finger stick was apparently 180 during this time as per son. Pt denies fever, chills, headache, visual deficits, chest pain, shortness of breath, palpitations, abdominal pain, constipation, hematochezia, melena, dysuria, hematuria, LOC, syncope, fall, seizures, convulsions, hemiparesis, paresthesias.    Upon arrival to ED, EKG: NSR with peaked T waves V2-V4. CT head: No acute intracranial hemorrhage, large cortical infarct or mass effect, given the extent of artifact. No significant interval change since 7/20/2017. WBC: 12.54. H/H: 10.0/30.6. BUN/Cr: 46/2.09. Glucose: 285. Pt was given 1L NS and admitted to telemetry. (03 Nov 2017 14:12)  Pt was found to have NIXON on admission.          +orthostatics --> rebolus 2nd liter, hold bp meds, f/u orthostatics, f/u tele  other issues as noted above  persistent LLL opacity - d/w pt/family --> check noncontrast chest CT  Pt's UA showed (+) LE, WBC 5-10.  Urine cultures are growing >100K ESBL e.coli.   She was initially started on rocephin for UTI.  ID consult was called for further antibiotic management.        REVIEW OF SYSTEMS:    CONSTITUTIONAL: No fever, weight loss, or fatigue  EYES: No eye pain, visual disturbances, or discharge  ENMT:  No sore throat  NECK: No pain or stiffness  RESPIRATORY: No cough, wheezing, chills or hemoptysis; No shortness of breath  CARDIOVASCULAR: No chest pain, palpitations, or leg swelling.  (+) dizziness  GASTROINTESTINAL: No abdominal or epigastric pain. No nausea, vomiting, or hematemesis; No diarrhea or constipation. No melena or hematochezia.  GENITOURINARY: No dysuria, frequency, hematuria, or incontinence  NEUROLOGICAL: No headaches, memory loss, loss of strength, numbness, or tremors  SKIN: No itching, burning, rashes, or lesions   LYMPH NODES: No enlarged glands  MUSCULOSKELETAL: No joint pain or swelling; No muscle, back, or extremity pain      PAST MEDICAL & SURGICAL HISTORY:  Dementia without behavioral disturbance, unspecified dementia type  Hypothyroidism  Anemia  CKD (chronic kidney disease)  DM (diabetes mellitus)  HLD (hyperlipidemia)  HTN (hypertension)  No significant past surgical history      Allergies    No Known Allergies    Intolerances        FAMILY HISTORY:  No pertinent family history in first degree relatives      SOCIAL HISTORY:    . Lives with . Independent with ambulation but requires some assistance with ADLs. No HHA or VN services. Denies etoh/smoking/substance abuse      MEDICATIONS  (STANDING):  aspirin enteric coated 81 milliGRAM(s) Oral daily  dextrose 5%. 1000 milliLiter(s) (50 mL/Hr) IV Continuous <Continuous>  dextrose 50% Injectable 12.5 Gram(s) IV Push once  dextrose 50% Injectable 25 Gram(s) IV Push once  dextrose 50% Injectable 25 Gram(s) IV Push once  ertapenem  IVPB      fluticasone propionate   220 MICROgram(s) HFA Inhaler 2 Puff(s) Inhalation two times a day  heparin  Injectable 5000 Unit(s) SubCutaneous every 8 hours  influenza   Vaccine 0.5 milliLiter(s) IntraMuscular once  insulin lispro (HumaLOG) corrective regimen sliding scale   SubCutaneous three times a day before meals  insulin lispro (HumaLOG) corrective regimen sliding scale   SubCutaneous at bedtime  levothyroxine 75 MICROGram(s) Oral daily  memantine 5 milliGRAM(s) Oral two times a day  NIFEdipine XL 30 milliGRAM(s) Oral once    MEDICATIONS  (PRN):  bisacodyl Suppository 10 milliGRAM(s) Rectal once PRN Constipation  dextrose Gel 1 Dose(s) Oral once PRN Blood Glucose LESS THAN 70 milliGRAM(s)/deciliter  glucagon  Injectable 1 milliGRAM(s) IntraMuscular once PRN Glucose LESS THAN 70 milligrams/deciliter      Vital Signs Last 24 Hrs  T(C): 36.8 (06 Nov 2017 05:43), Max: 36.8 (06 Nov 2017 05:43)  T(F): 98.3 (06 Nov 2017 05:43), Max: 98.3 (06 Nov 2017 05:43)  HR: 70 (06 Nov 2017 07:25) (70 - 73)  BP: 168/77 (06 Nov 2017 07:25) (168/77 - 197/87)  BP(mean): --  RR: 18 (06 Nov 2017 05:43) (18 - 18)  SpO2: 99% (06 Nov 2017 05:43) (99% - 99%)    PHYSICAL EXAM:    GENERAL: NAD, well-groomed, well-developed  HEAD:  Atraumatic, Normocephalic  EYES: EOMI, PERRLA, conjunctiva and sclera clear  ENMT: No tonsillar erythema, exudates, or enlargement; Moist mucous membranes  NECK: Supple, No JVD, Normal thyroid  NERVOUS SYSTEM:  Alert & Oriented X3, Good concentration; Motor Strength 5/5 B/L upper and lower extremities; DTRs 2+ intact and symmetric  CHEST/LUNG: Clear to percussion bilaterally; No rales, rhonchi, wheezing, or rubs  HEART: Regular rate and rhythm; No murmurs, rubs, or gallops  ABDOMEN: Soft, Nontender, Nondistended; Bowel sounds present  EXTREMITIES:  2+ Peripheral Pulses, No clubbing, cyanosis, or edema  LYMPH: No lymphadenopathy noted  SKIN: No rashes or lesions    LABS:  CBC Full  -  ( 06 Nov 2017 06:30 )  WBC Count : 8.19 K/uL  Hemoglobin : 9.7 g/dL  Hematocrit : 29.6 %  Platelet Count - Automated : 306 K/uL  Mean Cell Volume : 89.2 fL  Mean Cell Hemoglobin : 29.2 pg  Mean Cell Hemoglobin Concentration : 32.8 %  Auto Neutrophil # : x  Auto Lymphocyte # : x  Auto Monocyte # : x  Auto Eosinophil # : x  Auto Basophil # : x  Auto Neutrophil % : x  Auto Lymphocyte % : x  Auto Monocyte % : x  Auto Eosinophil % : x  Auto Basophil % : x      11-06    139  |  105  |  28<H>  ----------------------------<  170<H>  4.7   |  24  |  1.41<H>    Ca    8.0<L>      06 Nov 2017 06:30  Mg     2.1     11-06      MICROBIOLOGY:    Culture - Urine (11.04.17 @ 02:16)    -  Amikacin: S <=16 TATIANNA    -  Ampicillin: R >16 TATIANNA    -  Ampicillin/Sulbactam: R >16/8 TATIANNA    -  Aztreonam: R >16 TATIANNA    -  Cefazolin: R >16 TATIANNA    -  Cefepime: R >16 TATIANNA    -  Cefoxitin: S <=8 TATIANNA    -  Ceftazidime: R >16 TATIANNA    -  Ciprofloxacin: R >2 TATIANNA    -  Ertapenem: S <=1 TATIANNA    -  Gentamicin: R >8 TATIANNA    -  Imipenem: S <=1 TATIANNA    -  Levofloxacin: R >4 TATIANNA    -  Meropenem: S <=1 TATIANNA    -  Nitrofurantoin: S <=32 TATIANNA    -  Piperacillin/Tazobactam: R <=16 TATIANNA    -  Tigecycline: S <=2 TATIANNA    -  Tobramycin: I 8 TATIANNA    -  Trimethoprim/Sulfamethoxazole: R >2/38 TATIANNA    Culture - Urine:   COLONY COUNT: > = 100,000 CFU/ML    Culture - Urine:   RESULT CALLED TO / READ BACK:KEVIN BASILIO RN/Y  DATE / TIME CALLED: 11/06/17 1526  CALLED BY: PACO CUTLER    Specimen Source: URINE MIDSTREAM    Organism Identification: E.COLI ESBL POSITIVE    Organism: E.COLI ESBL POSITIVE  COLONY COUNT: > = 100,000 CFU/ML    Method Type: MICROSCAN NEG URINE COMBO 61            RADIOLOGY:      < from: CT Chest No Cont (11.03.17 @ 18:35) >    FINDINGS:    CHEST:     LUNGS AND LARGE AIRWAYS: Patent central airways.  4 mm right upper lobe   pulmonary nodule (series 3, image 84). Focal patchy opacities in the left   lingula and left lower lobe with nodular components are grossly unchanged   compared to prior exam. Right basilar linear atelectasis.  PLEURA: No pleural effusion. No pneumothorax.  VESSELS: Atherosclerotic calcification of the aorta.  HEART: Heart size is normal. Small pericardial effusion. Aortic valvular   calcifications.  MEDIASTINUM AND AKASH: No lymphadenopathy.  CHEST WALL AND LOWER NECK: 1.1 cm right thyroid nodule is unchanged.  VISUALIZED UPPER ABDOMEN: Calcified granuloma in the spleen.  BONES: Within normal limits.    IMPRESSION:     Focal patchy opacities in the left lingula and left lower lobe with   nodular components are grossly unchanged compared to prior exam.  Small pericardial effusion.    < end of copied text >      < from: Xray Chest 1 View AP-PORTABLE IMMEDIATE (11.03.17 @ 14:49) >  FINDINGS:     Cardiac silhouette normal in size. Unchanged left lower lobe linear   opacity. Lungs otherwise clear.. No pleural effusion or pneumothorax.    IMPRESSION:   Unchanged left lower lobe linear opacity. Lungs otherwise clear..     < end of copied text >      < from: CT Head No Cont (11.03.17 @ 05:35) >  INTERPRETATION:  Clinical indication: Dizziness.    Technique: CT axial images of the head were obtained without intravenous   contrast. Computer-reconstructed coronal and sagittal images were   obtained.    Comparison:  MRI 7/23/2017. CT 7/20/2017.    Findings: Patient motion degrades images. There is no obvious acute   intracranial hemorrhage, large cortical infarct, mass effect or midline   shift, given the extent of artifact. Nonspecific mild to moderate   periventricular and subcortical white matter lucencies likely represent   chronic microvascular ischemic changes. Cortical sulci and ventricles are   appropriate for the patient's stated age. Bilateral basal ganglia   calcifications.    There is no depressed skull fracture. There is minimal mucosal thickening   with a right maxillary sinus mucous cyst. The tympanomastoid region is   clear. Again noted is an old fracture of the left medial orbital   walls/lamina papyracea with herniation of fat. There is no significant   interval change.     Impression:     No acute intracranial hemorrhage, large cortical infarct or mass effect,   given the extent of artifact. No significant interval change since   7/20/2017. If clinically indicated, a short-term follow-up or an MRI may   be obtained for further evaluation.    < end of copied text >

## 2017-11-06 NOTE — PROGRESS NOTE ADULT - ASSESSMENT
Patient is a 75 yo Hungarian speaking woman from [facility] with a PMHx of dementia, HTN, HLD, DM, CKD, hypothyroidism, and chronic anemia presenting with dizziness, nausea and vomiting, found to be in NIXON and relative orthostatic hypotension, now with confirmed symptomatic ESBL E.coli UTI.

## 2017-11-06 NOTE — PROGRESS NOTE ADULT - PROBLEM SELECTOR PLAN 7
HbA1c 8.3  - Continue basal/bolus insulin and insulin sliding scale for coverage  - Hold oral hypoglycemics    Diabetic diet

## 2017-11-06 NOTE — PROGRESS NOTE ADULT - PROBLEM SELECTOR PLAN 4
Improved s/p IV hydration. Suspect pre-renal etiology. UA positive for pyuria, hematuria, proteinuria, and +leukocyte esterase, and few bacteria with urine cx growing E. coli >100K cfu/mL. Patient with known underlying CKD stage 3  - Monitor renal function and electrolytes  -change antibiotics to ertapenem as Ucx ESBL  - continue to hold Lasix and Lisinopril   - Avoid NSAIDs and nephrotoxins

## 2017-11-07 LAB
ALBUMIN SERPL ELPH-MCNC: 3.3 G/DL — SIGNIFICANT CHANGE UP (ref 3.3–5)
ALP SERPL-CCNC: 70 U/L — SIGNIFICANT CHANGE UP (ref 40–120)
ALT FLD-CCNC: 11 U/L — SIGNIFICANT CHANGE UP (ref 4–33)
AST SERPL-CCNC: 12 U/L — SIGNIFICANT CHANGE UP (ref 4–32)
BASOPHILS # BLD AUTO: 0.02 K/UL — SIGNIFICANT CHANGE UP (ref 0–0.2)
BASOPHILS NFR BLD AUTO: 0.3 % — SIGNIFICANT CHANGE UP (ref 0–2)
BILIRUB SERPL-MCNC: < 0.2 MG/DL — LOW (ref 0.2–1.2)
BUN SERPL-MCNC: 42 MG/DL — HIGH (ref 7–23)
CALCIUM SERPL-MCNC: 8.1 MG/DL — LOW (ref 8.4–10.5)
CHLORIDE SERPL-SCNC: 104 MMOL/L — SIGNIFICANT CHANGE UP (ref 98–107)
CO2 SERPL-SCNC: 22 MMOL/L — SIGNIFICANT CHANGE UP (ref 22–31)
CREAT SERPL-MCNC: 1.79 MG/DL — HIGH (ref 0.5–1.3)
EOSINOPHIL # BLD AUTO: 0.88 K/UL — HIGH (ref 0–0.5)
EOSINOPHIL NFR BLD AUTO: 11.3 % — HIGH (ref 0–6)
GLUCOSE SERPL-MCNC: 195 MG/DL — HIGH (ref 70–99)
HCT VFR BLD CALC: 30.5 % — LOW (ref 34.5–45)
HGB BLD-MCNC: 10.1 G/DL — LOW (ref 11.5–15.5)
IMM GRANULOCYTES # BLD AUTO: 0.02 # — SIGNIFICANT CHANGE UP
IMM GRANULOCYTES NFR BLD AUTO: 0.3 % — SIGNIFICANT CHANGE UP (ref 0–1.5)
LYMPHOCYTES # BLD AUTO: 2.63 K/UL — SIGNIFICANT CHANGE UP (ref 1–3.3)
LYMPHOCYTES # BLD AUTO: 33.8 % — SIGNIFICANT CHANGE UP (ref 13–44)
MAGNESIUM SERPL-MCNC: 2.3 MG/DL — SIGNIFICANT CHANGE UP (ref 1.6–2.6)
MCHC RBC-ENTMCNC: 30.1 PG — SIGNIFICANT CHANGE UP (ref 27–34)
MCHC RBC-ENTMCNC: 33.1 % — SIGNIFICANT CHANGE UP (ref 32–36)
MCV RBC AUTO: 90.8 FL — SIGNIFICANT CHANGE UP (ref 80–100)
MONOCYTES # BLD AUTO: 0.48 K/UL — SIGNIFICANT CHANGE UP (ref 0–0.9)
MONOCYTES NFR BLD AUTO: 6.2 % — SIGNIFICANT CHANGE UP (ref 2–14)
NEUTROPHILS # BLD AUTO: 3.76 K/UL — SIGNIFICANT CHANGE UP (ref 1.8–7.4)
NEUTROPHILS NFR BLD AUTO: 48.1 % — SIGNIFICANT CHANGE UP (ref 43–77)
NRBC # FLD: 0 — SIGNIFICANT CHANGE UP
PHOSPHATE SERPL-MCNC: 5.6 MG/DL — HIGH (ref 2.5–4.5)
PLATELET # BLD AUTO: 286 K/UL — SIGNIFICANT CHANGE UP (ref 150–400)
PMV BLD: 11.2 FL — SIGNIFICANT CHANGE UP (ref 7–13)
POTASSIUM SERPL-MCNC: 4.8 MMOL/L — SIGNIFICANT CHANGE UP (ref 3.5–5.3)
POTASSIUM SERPL-SCNC: 4.8 MMOL/L — SIGNIFICANT CHANGE UP (ref 3.5–5.3)
PROT SERPL-MCNC: 6.2 G/DL — SIGNIFICANT CHANGE UP (ref 6–8.3)
RBC # BLD: 3.36 M/UL — LOW (ref 3.8–5.2)
RBC # FLD: 13.4 % — SIGNIFICANT CHANGE UP (ref 10.3–14.5)
SODIUM SERPL-SCNC: 138 MMOL/L — SIGNIFICANT CHANGE UP (ref 135–145)
WBC # BLD: 7.79 K/UL — SIGNIFICANT CHANGE UP (ref 3.8–10.5)
WBC # FLD AUTO: 7.79 K/UL — SIGNIFICANT CHANGE UP (ref 3.8–10.5)

## 2017-11-07 PROCEDURE — 99233 SBSQ HOSP IP/OBS HIGH 50: CPT

## 2017-11-07 RX ORDER — POLYETHYLENE GLYCOL 3350 17 G/17G
17 POWDER, FOR SOLUTION ORAL DAILY
Qty: 0 | Refills: 0 | Status: DISCONTINUED | OUTPATIENT
Start: 2017-11-07 | End: 2017-11-09

## 2017-11-07 RX ORDER — DOCUSATE SODIUM 100 MG
100 CAPSULE ORAL
Qty: 0 | Refills: 0 | Status: DISCONTINUED | OUTPATIENT
Start: 2017-11-07 | End: 2017-11-09

## 2017-11-07 RX ORDER — LACTULOSE 10 G/15ML
20 SOLUTION ORAL ONCE
Qty: 0 | Refills: 0 | Status: COMPLETED | OUTPATIENT
Start: 2017-11-07 | End: 2017-11-07

## 2017-11-07 RX ORDER — INSULIN GLARGINE 100 [IU]/ML
15 INJECTION, SOLUTION SUBCUTANEOUS AT BEDTIME
Qty: 0 | Refills: 0 | Status: DISCONTINUED | OUTPATIENT
Start: 2017-11-07 | End: 2017-11-09

## 2017-11-07 RX ORDER — SODIUM CHLORIDE 9 MG/ML
1000 INJECTION INTRAMUSCULAR; INTRAVENOUS; SUBCUTANEOUS
Qty: 0 | Refills: 0 | Status: DISCONTINUED | OUTPATIENT
Start: 2017-11-07 | End: 2017-11-09

## 2017-11-07 RX ORDER — SENNA PLUS 8.6 MG/1
2 TABLET ORAL AT BEDTIME
Qty: 0 | Refills: 0 | Status: DISCONTINUED | OUTPATIENT
Start: 2017-11-07 | End: 2017-11-09

## 2017-11-07 RX ADMIN — SENNA PLUS 2 TABLET(S): 8.6 TABLET ORAL at 21:12

## 2017-11-07 RX ADMIN — LACTULOSE 20 GRAM(S): 10 SOLUTION ORAL at 17:55

## 2017-11-07 RX ADMIN — Medication 1: at 17:55

## 2017-11-07 RX ADMIN — HEPARIN SODIUM 5000 UNIT(S): 5000 INJECTION INTRAVENOUS; SUBCUTANEOUS at 21:15

## 2017-11-07 RX ADMIN — Medication 4: at 13:02

## 2017-11-07 RX ADMIN — Medication 81 MILLIGRAM(S): at 13:02

## 2017-11-07 RX ADMIN — HEPARIN SODIUM 5000 UNIT(S): 5000 INJECTION INTRAVENOUS; SUBCUTANEOUS at 13:01

## 2017-11-07 RX ADMIN — MEMANTINE HYDROCHLORIDE 5 MILLIGRAM(S): 10 TABLET ORAL at 17:55

## 2017-11-07 RX ADMIN — INSULIN GLARGINE 15 UNIT(S): 100 INJECTION, SOLUTION SUBCUTANEOUS at 21:12

## 2017-11-07 RX ADMIN — Medication 2 PUFF(S): at 21:11

## 2017-11-07 RX ADMIN — Medication 100 MILLIGRAM(S): at 17:55

## 2017-11-07 RX ADMIN — Medication 2: at 09:03

## 2017-11-07 RX ADMIN — MEMANTINE HYDROCHLORIDE 5 MILLIGRAM(S): 10 TABLET ORAL at 05:59

## 2017-11-07 RX ADMIN — Medication 2 PUFF(S): at 09:04

## 2017-11-07 RX ADMIN — HEPARIN SODIUM 5000 UNIT(S): 5000 INJECTION INTRAVENOUS; SUBCUTANEOUS at 05:59

## 2017-11-07 RX ADMIN — Medication 75 MICROGRAM(S): at 05:59

## 2017-11-07 RX ADMIN — SODIUM CHLORIDE 50 MILLILITER(S): 9 INJECTION INTRAMUSCULAR; INTRAVENOUS; SUBCUTANEOUS at 17:56

## 2017-11-07 RX ADMIN — Medication 60 MILLIGRAM(S): at 06:01

## 2017-11-07 NOTE — PROGRESS NOTE ADULT - PROBLEM SELECTOR PLAN 6
Dizziness maybe 2/2 BPPV/vasovagal episode--now improved  - continue cardiac monitoring with telemetry  - awaiting PT eval  -+orthostatics--repeat orthostatic neg  - can consider meclizine if pt still symptomatic

## 2017-11-07 NOTE — PROGRESS NOTE ADULT - ASSESSMENT
Patient is a 75 yo Japanese speaking woman from [facility] with a PMHx of dementia, HTN, HLD, DM, CKD, hypothyroidism, and chronic anemia presenting with dizziness, nausea and vomiting, found to be in NIXON and relative orthostatic hypotension, now with confirmed symptomatic ESBL E.coli UTI.

## 2017-11-07 NOTE — PROGRESS NOTE ADULT - SUBJECTIVE AND OBJECTIVE BOX
Patient is a 76y old  Female who presents with a chief complaint of Dizziness (03 Nov 2017 14:12)      SUBJECTIVE / OVERNIGHT EVENTS: Pt NSR 80's. afebrile family at bedside no acute compliants    MEDICATIONS  (STANDING):  aspirin enteric coated 81 milliGRAM(s) Oral daily  dextrose 5%. 1000 milliLiter(s) (50 mL/Hr) IV Continuous <Continuous>  dextrose 50% Injectable 12.5 Gram(s) IV Push once  dextrose 50% Injectable 25 Gram(s) IV Push once  dextrose 50% Injectable 25 Gram(s) IV Push once  ertapenem  IVPB      fluticasone propionate   220 MICROgram(s) HFA Inhaler 2 Puff(s) Inhalation two times a day  heparin  Injectable 5000 Unit(s) SubCutaneous every 8 hours  influenza   Vaccine 0.5 milliLiter(s) IntraMuscular once  insulin lispro (HumaLOG) corrective regimen sliding scale   SubCutaneous three times a day before meals  insulin lispro (HumaLOG) corrective regimen sliding scale   SubCutaneous at bedtime  levothyroxine 75 MICROGram(s) Oral daily  memantine 5 milliGRAM(s) Oral two times a day    MEDICATIONS  (PRN):  bisacodyl Suppository 10 milliGRAM(s) Rectal once PRN Constipation  dextrose Gel 1 Dose(s) Oral once PRN Blood Glucose LESS THAN 70 milliGRAM(s)/deciliter  glucagon  Injectable 1 milliGRAM(s) IntraMuscular once PRN Glucose LESS THAN 70 milligrams/deciliter        CAPILLARY BLOOD GLUCOSE      POCT Blood Glucose.: 347 mg/dL (07 Nov 2017 12:20)  POCT Blood Glucose.: 202 mg/dL (07 Nov 2017 08:51)  POCT Blood Glucose.: 237 mg/dL (06 Nov 2017 21:28)  POCT Blood Glucose.: 153 mg/dL (06 Nov 2017 17:24)    I&O's Summary      T(C): 36.4 (11-07-17 @ 05:53), Max: 36.9 (11-06-17 @ 17:28)  HR: 72 (11-07-17 @ 05:53) (72 - 78)  BP: 152/75 (11-07-17 @ 05:53) (150/67 - 183/72)  RR: 18 (11-07-17 @ 05:53) (18 - 18)  SpO2: 96% (11-07-17 @ 05:53) (94% - 99%)    PHYSICAL EXAM:  GENERAL: NAD, well-developed  HEAD:  Atraumatic, Normocephalic  EYES: EOMI, PERRLA, conjunctiva and sclera clear  NECK: Supple, No JVD  CHEST/LUNG: Clear to auscultation bilaterally; No wheeze  HEART: Regular rate and rhythm; No murmurs, rubs, or gallops  ABDOMEN: Soft, Nontender, Nondistended; Bowel sounds present  EXTREMITIES:  2+ Peripheral Pulses, No clubbing, cyanosis, or edema  PSYCH: AAOx1-2  NEUROLOGY: non-focal  SKIN: No rashes or lesions    LABS:                        10.1   7.79  )-----------( 286      ( 07 Nov 2017 07:20 )             30.5     11-07    138  |  104  |  42<H>  ----------------------------<  195<H>  4.8   |  22  |  1.79<H>    Ca    8.1<L>      07 Nov 2017 08:00  Phos  5.6     11-07  Mg     2.3     11-07    TPro  6.2  /  Alb  3.3  /  TBili  < 0.2<L>  /  DBili  x   /  AST  12  /  ALT  11  /  AlkPhos  70  11-07        Culture - Urine (11.04.17 @ 02:16)    -  Amikacin: S <=16 TATIANNA    -  Ampicillin: R >16 TATIANNA    -  Ampicillin/Sulbactam: R >16/8 TATIANNA    -  Aztreonam: R >16 TATIANNA    -  Cefazolin: R >16 TATIANNA    -  Cefepime: R >16 TATIANNA    -  Cefoxitin: S <=8 TATIANNA    -  Ceftazidime: R >16 TATIANNA    -  Ciprofloxacin: R >2 TATIANNA    -  Ertapenem: S <=1 TATIANNA    -  Gentamicin: R >8 TATIANNA    -  Imipenem: S <=1 TATIANNA    -  Levofloxacin: R >4 TATIANNA    -  Meropenem: S <=1 TATIANNA    -  Nitrofurantoin: S <=32 TATIANNA    -  Piperacillin/Tazobactam: R <=16 TATIANNA    -  Tigecycline: S <=2 TATIANNA    -  Tobramycin: I 8 TATIANNA    -  Trimethoprim/Sulfamethoxazole: R >2/38 TATIANNA    Culture - Urine:   COLONY COUNT: > = 100,000 CFU/ML    Culture - Urine:   RESULT CALLED TO / READ BACK:KEVIN BASILIO RN/Y  DATE / TIME CALLED: 11/06/17 1526  CALLED BY: PACO CUTLER    Specimen Source: URINE MIDSTREAM    Organism Identification: E.COLI ESBL POSITIVE    Organism: E.COLI ESBL POSITIVE  COLONY COUNT: > = 100,000 CFU/ML    Method Type: MICROSCAN NEG URINE COMBO 61            RADIOLOGY & ADDITIONAL TESTS:    Imaging Personally Reviewed:< from: Xray Abdomen Multiple Views (11.06.17 @ 17:03) >  MPRESSION: Nonobstructive bowel gas pattern.    < end of copied text >      Consultant(s) Notes Reviewed:      Care Discussed with Consultants/Other Providers: Patient is a 76y old  Female who presents with a chief complaint of Dizziness (03 Nov 2017 14:12)      SUBJECTIVE / OVERNIGHT EVENTS: Pt NSR 80's. no BM. afebrile family at bedside no acute compliants    MEDICATIONS  (STANDING):  aspirin enteric coated 81 milliGRAM(s) Oral daily  dextrose 5%. 1000 milliLiter(s) (50 mL/Hr) IV Continuous <Continuous>  dextrose 50% Injectable 12.5 Gram(s) IV Push once  dextrose 50% Injectable 25 Gram(s) IV Push once  dextrose 50% Injectable 25 Gram(s) IV Push once  ertapenem  IVPB      fluticasone propionate   220 MICROgram(s) HFA Inhaler 2 Puff(s) Inhalation two times a day  heparin  Injectable 5000 Unit(s) SubCutaneous every 8 hours  influenza   Vaccine 0.5 milliLiter(s) IntraMuscular once  insulin lispro (HumaLOG) corrective regimen sliding scale   SubCutaneous three times a day before meals  insulin lispro (HumaLOG) corrective regimen sliding scale   SubCutaneous at bedtime  levothyroxine 75 MICROGram(s) Oral daily  memantine 5 milliGRAM(s) Oral two times a day    MEDICATIONS  (PRN):  bisacodyl Suppository 10 milliGRAM(s) Rectal once PRN Constipation  dextrose Gel 1 Dose(s) Oral once PRN Blood Glucose LESS THAN 70 milliGRAM(s)/deciliter  glucagon  Injectable 1 milliGRAM(s) IntraMuscular once PRN Glucose LESS THAN 70 milligrams/deciliter        CAPILLARY BLOOD GLUCOSE      POCT Blood Glucose.: 347 mg/dL (07 Nov 2017 12:20)  POCT Blood Glucose.: 202 mg/dL (07 Nov 2017 08:51)  POCT Blood Glucose.: 237 mg/dL (06 Nov 2017 21:28)  POCT Blood Glucose.: 153 mg/dL (06 Nov 2017 17:24)    I&O's Summary      T(C): 36.4 (11-07-17 @ 05:53), Max: 36.9 (11-06-17 @ 17:28)  HR: 72 (11-07-17 @ 05:53) (72 - 78)  BP: 152/75 (11-07-17 @ 05:53) (150/67 - 183/72)  RR: 18 (11-07-17 @ 05:53) (18 - 18)  SpO2: 96% (11-07-17 @ 05:53) (94% - 99%)    PHYSICAL EXAM:  GENERAL: NAD, well-developed  HEAD:  Atraumatic, Normocephalic  EYES: EOMI, PERRLA, conjunctiva and sclera clear  NECK: Supple, No JVD  CHEST/LUNG: Clear to auscultation bilaterally; No wheeze  HEART: Regular rate and rhythm; No murmurs, rubs, or gallops  ABDOMEN: Soft, Nontender, Nondistended; Bowel sounds present  EXTREMITIES:  2+ Peripheral Pulses, No clubbing, cyanosis, or edema  PSYCH: AAOx1-2  NEUROLOGY: non-focal  SKIN: No rashes or lesions    LABS:                        10.1   7.79  )-----------( 286      ( 07 Nov 2017 07:20 )             30.5     11-07    138  |  104  |  42<H>  ----------------------------<  195<H>  4.8   |  22  |  1.79<H>    Ca    8.1<L>      07 Nov 2017 08:00  Phos  5.6     11-07  Mg     2.3     11-07    TPro  6.2  /  Alb  3.3  /  TBili  < 0.2<L>  /  DBili  x   /  AST  12  /  ALT  11  /  AlkPhos  70  11-07        Culture - Urine (11.04.17 @ 02:16)    -  Amikacin: S <=16 TATIANNA    -  Ampicillin: R >16 TATIANNA    -  Ampicillin/Sulbactam: R >16/8 TATIANNA    -  Aztreonam: R >16 TATIANNA    -  Cefazolin: R >16 TATIANNA    -  Cefepime: R >16 TATIANNA    -  Cefoxitin: S <=8 TATIANNA    -  Ceftazidime: R >16 TATIANNA    -  Ciprofloxacin: R >2 TATIANNA    -  Ertapenem: S <=1 TATIANNA    -  Gentamicin: R >8 TATIANNA    -  Imipenem: S <=1 TATIANNA    -  Levofloxacin: R >4 TATIANNA    -  Meropenem: S <=1 TATIANNA    -  Nitrofurantoin: S <=32 TATIANNA    -  Piperacillin/Tazobactam: R <=16 TATIANNA    -  Tigecycline: S <=2 TATIANNA    -  Tobramycin: I 8 TATIANNA    -  Trimethoprim/Sulfamethoxazole: R >2/38 TATIANNA    Culture - Urine:   COLONY COUNT: > = 100,000 CFU/ML    Culture - Urine:   RESULT CALLED TO / READ BACK:KEVIN BASILIO RN/Y  DATE / TIME CALLED: 11/06/17 1526  CALLED BY: PACO CUTLER    Specimen Source: URINE MIDSTREAM    Organism Identification: E.COLI ESBL POSITIVE    Organism: E.COLI ESBL POSITIVE  COLONY COUNT: > = 100,000 CFU/ML    Method Type: MICROSCAN NEG URINE COMBO 61            RADIOLOGY & ADDITIONAL TESTS:    Imaging Personally Reviewed:< from: Xray Abdomen Multiple Views (11.06.17 @ 17:03) >  MPRESSION: Nonobstructive bowel gas pattern.    < end of copied text >      Consultant(s) Notes Reviewed:      Care Discussed with Consultants/Other Providers:

## 2017-11-07 NOTE — PROGRESS NOTE ADULT - ASSESSMENT
75 y/o [place] speaking female with a PMHx of dementia, HTN, HLD, DM, CKD, hypothyroidism, and anemia presents to ED complaining of dizziness, nausea and vomiting, found to be in [place]. She was also found to have a persistent LLL opacity for which CT chest ordered and showed focal patchy opacities in the left lingula and left lower lobe with nodular components which were unchanged compared to prior exam. 75 y/o [place] speaking female with a PMHx of HTN, HLD, DM, CKD, hypothyroidism, and anemia presents to ED complaining of dizziness, nausea and vomiting, found to be in [place].        +orthostatics --> rebolus 2nd liter, hold bp meds, f/u orthostatics, f/u tele  other issues as noted above  persistent LLL opacity - d/w pt/family --> check noncontrast chest CT  Pt's UA showed (+) LE, WBC 5-10.  Urine cultures are growing >100K ESBL e.coli.   She was initially started on rocephin for UTI.  ID consult was called for further antibiotic management.      Recommend:    - Agree with ertapenem for esbl uti.  Anticipate short 3 day course for uncomplicated UTI (day #2/3)    - Monitor renal function.  If CrCl < 30, switch to Erta 500mg IV qdaily.  Cr rising today.  Pt started on IVF.  Continuing to hold lasix.    Glo Rodriguez  [phone]

## 2017-11-07 NOTE — PROGRESS NOTE ADULT - PROBLEM SELECTOR PLAN 2
-dulcolax x 1 no BM as per family at bedside   -AXR-non obstructive pattern  -add senna/colace/lactulose x 1

## 2017-11-07 NOTE — PROGRESS NOTE ADULT - SUBJECTIVE AND OBJECTIVE BOX
Infectious Diseases progress note:    Subjective:  Appears comfortable, NAD.  Family member present at bedside.  Afebrile.  No burning on urination    ROS:  CONSTITUTIONAL:  No fever, chills, rigors  CARDIOVASCULAR:  No chest pain or palpitations  RESPIRATORY:   No SOB, cough, dyspnea on exertion.  No wheezing  GASTROINTESTINAL:  No abd pain, N/V, diarrhea/constipation  EXTREMITIES:  No swelling or joint pain  GENITOURINARY:  No burning on urination, increased frequency or urgency.  No flank pain  NEUROLOGIC:  No HA, visual disturbances  SKIN: No rashes    Allergies    No Known Allergies    Intolerances        ANTIBIOTICS/RELEVANT:  antimicrobials  ertapenem  IVPB        immunologic:  influenza   Vaccine 0.5 milliLiter(s) IntraMuscular once    OTHER:  aspirin enteric coated 81 milliGRAM(s) Oral daily  bisacodyl Suppository 10 milliGRAM(s) Rectal once PRN  dextrose 5%. 1000 milliLiter(s) IV Continuous <Continuous>  dextrose 50% Injectable 12.5 Gram(s) IV Push once  dextrose 50% Injectable 25 Gram(s) IV Push once  dextrose 50% Injectable 25 Gram(s) IV Push once  dextrose Gel 1 Dose(s) Oral once PRN  docusate sodium 100 milliGRAM(s) Oral two times a day  fluticasone propionate   220 MICROgram(s) HFA Inhaler 2 Puff(s) Inhalation two times a day  glucagon  Injectable 1 milliGRAM(s) IntraMuscular once PRN  heparin  Injectable 5000 Unit(s) SubCutaneous every 8 hours  insulin glargine Injectable (LANTUS) 15 Unit(s) SubCutaneous at bedtime  insulin lispro (HumaLOG) corrective regimen sliding scale   SubCutaneous three times a day before meals  insulin lispro (HumaLOG) corrective regimen sliding scale   SubCutaneous at bedtime  levothyroxine 75 MICROGram(s) Oral daily  memantine 5 milliGRAM(s) Oral two times a day  polyethylene glycol 3350 17 Gram(s) Oral daily PRN  senna 2 Tablet(s) Oral at bedtime  sodium chloride 0.9%. 1000 milliLiter(s) IV Continuous <Continuous>      Objective:  Vital Signs Last 24 Hrs  T(C): 36.9 (07 Nov 2017 21:08), Max: 36.9 (07 Nov 2017 21:08)  T(F): 98.5 (07 Nov 2017 21:08), Max: 98.5 (07 Nov 2017 21:08)  HR: 79 (07 Nov 2017 21:08) (72 - 79)  BP: 142/57 (07 Nov 2017 21:08) (142/57 - 154/67)  BP(mean): --  RR: 17 (07 Nov 2017 21:08) (16 - 18)  SpO2: 95% (07 Nov 2017 21:08) (91% - 96%)    PHYSICAL EXAM:  Constitutional:NAD  Eyes:ROMAIN, EOMI  Ear/Nose/Throat: no thrush, mucositis.  Moist mucous membranes	  Neck:no JVD, no lymphadenopathy, supple  Respiratory: CTA candi  Cardiovascular: S1S2 RRR, no murmurs  Gastrointestinal:soft, nontender,  nondistended (+) BS  Extremities:no e/e/c  Skin:  no rashes, open wounds or ulcerations        LABS:                        10.1   7.79  )-----------( 286      ( 07 Nov 2017 07:20 )             30.5     11-07    138  |  104  |  42<H>  ----------------------------<  195<H>  4.8   |  22  |  1.79<H>    Ca    8.1<L>      07 Nov 2017 08:00  Phos  5.6     11-07  Mg     2.3     11-07    TPro  6.2  /  Alb  3.3  /  TBili  < 0.2<L>  /  DBili  x   /  AST  12  /  ALT  11  /  AlkPhos  70  11-07        MICROBIOLOGY:    Culture - Urine (11.04.17 @ 02:16)    -  Amikacin: S <=16 TATIANNA    -  Ampicillin: R >16 TATIANNA    -  Ampicillin/Sulbactam: R >16/8 TATIANNA    -  Aztreonam: R >16 TATIANNA    -  Cefazolin: R >16 TATIANNA    -  Cefepime: R >16 TATIANNA    -  Cefoxitin: S <=8 TATIANNA    -  Ceftazidime: R >16 TATIANNA    -  Ciprofloxacin: R >2 TATIANNA    -  Ertapenem: S <=1 TATIANNA    -  Gentamicin: R >8 TATIANNA    -  Imipenem: S <=1 TATIANNA    -  Levofloxacin: R >4 TATIANNA    -  Meropenem: S <=1 TATIANNA    -  Nitrofurantoin: S <=32 TATIANNA    -  Piperacillin/Tazobactam: R <=16 TATIANNA    -  Tigecycline: S <=2 TATIANNA    -  Tobramycin: I 8 TATIANNA    -  Trimethoprim/Sulfamethoxazole: R >2/38 TATIANNA    Culture - Urine:   COLONY COUNT: > = 100,000 CFU/ML    Culture - Urine:   RESULT CALLED TO / READ BACK:KEVIN BASILIO RN/Y  DATE / TIME CALLED: 11/06/17 1526  CALLED BY: PACO CUTLER    Specimen Source: URINE MIDSTREAM    Organism Identification: E.COLI ESBL POSITIVE    Organism: E.COLI ESBL POSITIVE  COLONY COUNT: > = 100,000 CFU/ML    Method Type: MICROSCAN NEG URINE COMBO 61          RADIOLOGY & ADDITIONAL STUDIES:    < from: Xray Abdomen Multiple Views (11.06.17 @ 17:03) >  FINDINGS:   There is a nonobstructive bowel gas pattern. Moderate amount of stool   visualized in the colon.  There is no free air visualized.   Degenerative changes in the spine.    IMPRESSION: Nonobstructive bowel gas pattern.    < end of copied text >

## 2017-11-07 NOTE — PROGRESS NOTE ADULT - PROBLEM SELECTOR PLAN 3
gentle IVF.  Suspect pre-renal etiology. UA positive for pyuria, hematuria, proteinuria, and +leukocyte esterase, and few bacteria with urine cx growing E. coli >100K cfu/mL. Patient with known underlying CKD stage 3  - Cr elevated secondary to UTI and decreased PO intake will restart IVF and monitor Cr  - continue to hold Lasix and Lisinopril   - Avoid NSAIDs and nephrotoxins

## 2017-11-08 DIAGNOSIS — R91.8 OTHER NONSPECIFIC ABNORMAL FINDING OF LUNG FIELD: ICD-10-CM

## 2017-11-08 LAB
BUN SERPL-MCNC: 38 MG/DL — HIGH (ref 7–23)
CALCIUM SERPL-MCNC: 8.1 MG/DL — LOW (ref 8.4–10.5)
CHLORIDE SERPL-SCNC: 108 MMOL/L — HIGH (ref 98–107)
CO2 SERPL-SCNC: 21 MMOL/L — LOW (ref 22–31)
CREAT SERPL-MCNC: 1.49 MG/DL — HIGH (ref 0.5–1.3)
GLUCOSE SERPL-MCNC: 152 MG/DL — HIGH (ref 70–99)
HCT VFR BLD CALC: 29.5 % — LOW (ref 34.5–45)
HGB BLD-MCNC: 9.4 G/DL — LOW (ref 11.5–15.5)
MCHC RBC-ENTMCNC: 29.1 PG — SIGNIFICANT CHANGE UP (ref 27–34)
MCHC RBC-ENTMCNC: 31.9 % — LOW (ref 32–36)
MCV RBC AUTO: 91.3 FL — SIGNIFICANT CHANGE UP (ref 80–100)
NRBC # FLD: 0 — SIGNIFICANT CHANGE UP
PLATELET # BLD AUTO: 284 K/UL — SIGNIFICANT CHANGE UP (ref 150–400)
PMV BLD: 11.9 FL — SIGNIFICANT CHANGE UP (ref 7–13)
POTASSIUM SERPL-MCNC: 5.1 MMOL/L — SIGNIFICANT CHANGE UP (ref 3.5–5.3)
POTASSIUM SERPL-SCNC: 5.1 MMOL/L — SIGNIFICANT CHANGE UP (ref 3.5–5.3)
RBC # BLD: 3.23 M/UL — LOW (ref 3.8–5.2)
RBC # FLD: 13.6 % — SIGNIFICANT CHANGE UP (ref 10.3–14.5)
SODIUM SERPL-SCNC: 141 MMOL/L — SIGNIFICANT CHANGE UP (ref 135–145)
WBC # BLD: 7.95 K/UL — SIGNIFICANT CHANGE UP (ref 3.8–10.5)
WBC # FLD AUTO: 7.95 K/UL — SIGNIFICANT CHANGE UP (ref 3.8–10.5)

## 2017-11-08 PROCEDURE — 99223 1ST HOSP IP/OBS HIGH 75: CPT | Mod: GC

## 2017-11-08 PROCEDURE — 99233 SBSQ HOSP IP/OBS HIGH 50: CPT

## 2017-11-08 RX ORDER — NIFEDIPINE 30 MG
30 TABLET, EXTENDED RELEASE 24 HR ORAL ONCE
Qty: 0 | Refills: 0 | Status: COMPLETED | OUTPATIENT
Start: 2017-11-08 | End: 2017-11-08

## 2017-11-08 RX ORDER — AMLODIPINE BESYLATE 2.5 MG/1
5 TABLET ORAL ONCE
Qty: 0 | Refills: 0 | Status: DISCONTINUED | OUTPATIENT
Start: 2017-11-08 | End: 2017-11-08

## 2017-11-08 RX ORDER — HYDRALAZINE HCL 50 MG
10 TABLET ORAL ONCE
Qty: 0 | Refills: 0 | Status: COMPLETED | OUTPATIENT
Start: 2017-11-08 | End: 2017-11-08

## 2017-11-08 RX ADMIN — Medication 2: at 13:29

## 2017-11-08 RX ADMIN — SENNA PLUS 2 TABLET(S): 8.6 TABLET ORAL at 21:14

## 2017-11-08 RX ADMIN — Medication 100 MILLIGRAM(S): at 06:04

## 2017-11-08 RX ADMIN — Medication 100 MILLIGRAM(S): at 18:01

## 2017-11-08 RX ADMIN — MEMANTINE HYDROCHLORIDE 5 MILLIGRAM(S): 10 TABLET ORAL at 06:04

## 2017-11-08 RX ADMIN — SODIUM CHLORIDE 50 MILLILITER(S): 9 INJECTION INTRAMUSCULAR; INTRAVENOUS; SUBCUTANEOUS at 10:42

## 2017-11-08 RX ADMIN — HEPARIN SODIUM 5000 UNIT(S): 5000 INJECTION INTRAVENOUS; SUBCUTANEOUS at 14:44

## 2017-11-08 RX ADMIN — Medication 2 PUFF(S): at 10:42

## 2017-11-08 RX ADMIN — Medication 10 MILLIGRAM(S): at 22:50

## 2017-11-08 RX ADMIN — SODIUM CHLORIDE 50 MILLILITER(S): 9 INJECTION INTRAMUSCULAR; INTRAVENOUS; SUBCUTANEOUS at 21:14

## 2017-11-08 RX ADMIN — Medication 60 MILLIGRAM(S): at 06:04

## 2017-11-08 RX ADMIN — HEPARIN SODIUM 5000 UNIT(S): 5000 INJECTION INTRAVENOUS; SUBCUTANEOUS at 21:14

## 2017-11-08 RX ADMIN — Medication 75 MICROGRAM(S): at 06:04

## 2017-11-08 RX ADMIN — MEMANTINE HYDROCHLORIDE 5 MILLIGRAM(S): 10 TABLET ORAL at 18:02

## 2017-11-08 RX ADMIN — Medication 81 MILLIGRAM(S): at 14:45

## 2017-11-08 RX ADMIN — Medication 2 PUFF(S): at 21:14

## 2017-11-08 RX ADMIN — Medication 30 MILLIGRAM(S): at 18:58

## 2017-11-08 RX ADMIN — HEPARIN SODIUM 5000 UNIT(S): 5000 INJECTION INTRAVENOUS; SUBCUTANEOUS at 06:04

## 2017-11-08 RX ADMIN — Medication 3: at 18:00

## 2017-11-08 RX ADMIN — INSULIN GLARGINE 15 UNIT(S): 100 INJECTION, SOLUTION SUBCUTANEOUS at 22:18

## 2017-11-08 NOTE — PROGRESS NOTE ADULT - PROBLEM SELECTOR PLAN 2
-dulcolax x 1 no BM as per family at bedside   -AXR-non obstructive pattern  -add senna/colace/lactulose x 1 -AXR-non obstructive pattern moderate stool in colon   -add senna/colace/dulocolax  -stool count

## 2017-11-08 NOTE — PROGRESS NOTE ADULT - PROBLEM SELECTOR PLAN 3
gentle IVF.  Suspect pre-renal etiology. UA positive for pyuria, hematuria, proteinuria, and +leukocyte esterase, and few bacteria with urine cx growing E. coli >100K cfu/mL. Patient with known underlying CKD stage 3  - Cr elevated secondary to UTI and decreased PO intake will restart IVF and monitor Cr  - continue to hold Lasix and Lisinopril   - Avoid NSAIDs and nephrotoxins Ucx ESBL ecoli   -ertapenemday 3/3

## 2017-11-08 NOTE — PROGRESS NOTE ADULT - PROBLEM SELECTOR PLAN 1
Ucx ESBL ecoli   -ertapenem x 3 days -persistent infiltrate on CT Chest post treatment fo PNA in 7/2017 Pulmonary consult

## 2017-11-08 NOTE — PROGRESS NOTE ADULT - PROBLEM SELECTOR PLAN 6
Dizziness maybe 2/2 BPPV/vasovagal episode--now improved  - continue cardiac monitoring with telemetry  - awaiting PT eval  -+orthostatics--repeat orthostatic neg  - can consider meclizine if pt still symptomatic -monitor BP goal 's cont procardia  - Monitor BP serially  - DASH diet

## 2017-11-08 NOTE — CONSULT NOTE ADULT - SUBJECTIVE AND OBJECTIVE BOX
CHIEF COMPLAINT:    HPI:    PAST MEDICAL & SURGICAL HISTORY:  Dementia without behavioral disturbance, unspecified dementia type  Hypothyroidism  Anemia  CKD (chronic kidney disease)  DM (diabetes mellitus)  HLD (hyperlipidemia)  HTN (hypertension)  No significant past surgical history      FAMILY HISTORY:  No pertinent family history in first degree relatives      SOCIAL HISTORY:  Smoking: [ ] Never Smoked [ ] Former Smoker (__ packs x ___ years) [ ] Current Smoker  (__ packs x ___ years)  Substance Use: [ ] Never Used [ ] Used ____  EtOH Use:  Marital Status: [ ] Single [ ]  [ ]  [ ]   Sexual History:   Occupation:  Recent Travel:  Country of Birth:  Advance Directives:    Allergies    No Known Allergies    Intolerances        HOME MEDICATIONS:    REVIEW OF SYSTEMS:  Constitutional: [ ] fevers [ ] chills [ ] weight loss [ ] weight gain  HEENT: [ ] dry eyes [ ] eye irritation [ ] postnasal drip [ ] nasal congestion  CV: [ ] chest pain [ ] orthopnea [ ] palpitations [ ] murmur  Resp: [ ] cough [ ] shortness of breath [ ] dyspnea [ ] wheezing [ ] sputum [ ] hemoptysis  GI: [ ] nausea [ ] vomiting [ ] diarrhea [ ] constipation [ ] abd pain [ ] dysphagia   : [ ] dysuria [ ] nocturia [ ] hematuria [ ] increased urinary frequency  Musculoskeletal: [ ] back pain [ ] myalgias [ ] arthralgias [ ] fracture  Skin: [ ] rash [ ] itch  Neurological: [ ] headache [ ] dizziness [ ] syncope [ ] weakness [ ] numbness  Psychiatric: [ ] anxiety [ ] depression  Endocrine: [ ] diabetes [ ] thyroid problem  Hematologic/Lymphatic: [ ] anemia [ ] bleeding problem  Allergic/Immunologic: [ ] itchy eyes [ ] nasal discharge [ ] hives [ ] angioedema  [ ] All other systems negative  [ ] Unable to assess ROS because ________    OBJECTIVE:  ICU Vital Signs Last 24 Hrs  T(C): 36.6 (08 Nov 2017 05:55), Max: 36.9 (07 Nov 2017 21:08)  T(F): 97.9 (08 Nov 2017 05:55), Max: 98.5 (07 Nov 2017 21:08)  HR: 73 (08 Nov 2017 05:55) (73 - 79)  BP: 160/72 (08 Nov 2017 05:55) (142/57 - 160/72)  BP(mean): --  ABP: --  ABP(mean): --  RR: 17 (08 Nov 2017 05:55) (16 - 17)  SpO2: 97% (08 Nov 2017 05:55) (91% - 97%)        CAPILLARY BLOOD GLUCOSE  156 (07 Nov 2017 21:08)      POCT Blood Glucose.: 149 mg/dL (08 Nov 2017 08:50)      PHYSICAL EXAM:  General:   HEENT:   Lymph Nodes:  Neck:   Respiratory:   Cardiovascular:   Abdomen:   Extremities:   Skin:   Neurological:  Psychiatry:    HOSPITAL MEDICATIONS:  MEDICATIONS  (STANDING):  aspirin enteric coated 81 milliGRAM(s) Oral daily  dextrose 5%. 1000 milliLiter(s) (50 mL/Hr) IV Continuous <Continuous>  dextrose 50% Injectable 12.5 Gram(s) IV Push once  dextrose 50% Injectable 25 Gram(s) IV Push once  dextrose 50% Injectable 25 Gram(s) IV Push once  docusate sodium 100 milliGRAM(s) Oral two times a day  ertapenem  IVPB      fluticasone propionate   220 MICROgram(s) HFA Inhaler 2 Puff(s) Inhalation two times a day  heparin  Injectable 5000 Unit(s) SubCutaneous every 8 hours  influenza   Vaccine 0.5 milliLiter(s) IntraMuscular once  insulin glargine Injectable (LANTUS) 15 Unit(s) SubCutaneous at bedtime  insulin lispro (HumaLOG) corrective regimen sliding scale   SubCutaneous three times a day before meals  insulin lispro (HumaLOG) corrective regimen sliding scale   SubCutaneous at bedtime  levothyroxine 75 MICROGram(s) Oral daily  memantine 5 milliGRAM(s) Oral two times a day  senna 2 Tablet(s) Oral at bedtime  sodium chloride 0.9%. 1000 milliLiter(s) (50 mL/Hr) IV Continuous <Continuous>    MEDICATIONS  (PRN):  bisacodyl Suppository 10 milliGRAM(s) Rectal once PRN Constipation  dextrose Gel 1 Dose(s) Oral once PRN Blood Glucose LESS THAN 70 milliGRAM(s)/deciliter  glucagon  Injectable 1 milliGRAM(s) IntraMuscular once PRN Glucose LESS THAN 70 milligrams/deciliter  polyethylene glycol 3350 17 Gram(s) Oral daily PRN Constipation      LABS:                        9.4    7.95  )-----------( 284      ( 08 Nov 2017 07:30 )             29.5     Hgb Trend: 9.4<--, 10.1<--, 9.7<--, 9.5<--, 9.5<--  11-08    141  |  108<H>  |  38<H>  ----------------------------<  152<H>  5.1   |  21<L>  |  1.49<H>    Ca    8.1<L>      08 Nov 2017 07:30  Phos  5.6     11-07  Mg     2.3     11-07    TPro  6.2  /  Alb  3.3  /  TBili  < 0.2<L>  /  DBili  x   /  AST  12  /  ALT  11  /  AlkPhos  70  11-07    Creatinine Trend: 1.49<--, 1.79<--, 1.41<--, 1.29<--, 1.38<--, 1.80<--            MICROBIOLOGY:     RADIOLOGY:  [ ] Reviewed and interpreted by me    PULMONARY FUNCTION TESTS:    EKG: CHIEF COMPLAINT: SOB    HPI:  75 y/o German speaking female (son at bedside helping translate) with a PMHx of dementia, HTN, HLD, DM, CKD, hypothyroidism, and anemia originally presented to ED complaining of dizziness. Pulmonary consult was called for persistent left sided pulmonary infiltrate seen on CT in July and Nov.     Pt states she has been SOB for several weeks. Sxs occur when she lies flat, endorsing a feeling of choking, and resolve when she sits up. She reports that her presenting sx of dizziness is a feeling of imbalance and occurs when standing from sitting. She denies a sensation of the world spinning around her. She denies recurrence since admission. She also endorses LE swelling that has improved since taking a pill, not sure what. She also reports an episode of pna in March 2017 that was treated in [facility], unsure of laterality. She denies having been diagnosed with heart or pulmonary disease in the past. She states she has dysuria on admission that is now significantly improved. She currently denies CP, SOB, lightheadedness (other than as detailed), HA, vision changes, N/V.    PAST MEDICAL & SURGICAL HISTORY:  Dementia without behavioral disturbance, unspecified dementia type  Hypothyroidism  Anemia  CKD (chronic kidney disease)  DM (diabetes mellitus)  HLD (hyperlipidemia)  HTN (hypertension)  No significant past surgical history      FAMILY HISTORY:  No pertinent family history in first degree relatives      SOCIAL HISTORY:  Smoking: [ X ] Never Smoked [ ] Former Smoker (__ packs x ___ years) [ ] Current Smoker  (__ packs x ___ years)  Substance Use: [ X ] Never Used [ ] Used ____  EtOH Use: denies  Marital Status: [ ] Single [ ]  [ ]  [ ]   Sexual History:   Occupation:  Recent Travel:  Country of Birth: [facility]  Advance Directives:    Allergies    No Known Allergies    Intolerances        HOME MEDICATIONS:    REVIEW OF SYSTEMS:  Constitutional: [ ] fevers [ ] chills [ ] weight loss [ ] weight gain  HEENT: [ ] dry eyes [ ] eye irritation [ ] postnasal drip [ ] nasal congestion  CV: [ ] chest pain [ X ] orthopnea [ ] palpitations [ ] murmur  Resp: [ ] cough [ ] shortness of breath [ X ] dyspnea [ ] wheezing [ ] sputum [ ] hemoptysis  GI: [ ] nausea [ ] vomiting [ ] diarrhea [ ] constipation [ ] abd pain [ ] dysphagia   : [ X ] dysuria [ ] nocturia [ ] hematuria [ ] increased urinary frequency  Musculoskeletal: [ ] back pain [ ] myalgias [ ] arthralgias [ ] fracture  Skin: [ ] rash [ ] itch  Neurological: [ ] headache [ ] dizziness [ ] syncope [ ] weakness [ ] numbness  Psychiatric: [ ] anxiety [ ] depression  Endocrine: [ ] diabetes [ ] thyroid problem  Hematologic/Lymphatic: [ ] anemia [ ] bleeding problem  Allergic/Immunologic: [ ] itchy eyes [ ] nasal discharge [ ] hives [ ] angioedema  [X  ] All other systems negative  [ ] Unable to assess ROS because ________    OBJECTIVE:  ICU Vital Signs Last 24 Hrs  T(C): 36.6 (08 Nov 2017 05:55), Max: 36.9 (07 Nov 2017 21:08)  T(F): 97.9 (08 Nov 2017 05:55), Max: 98.5 (07 Nov 2017 21:08)  HR: 73 (08 Nov 2017 05:55) (73 - 79)  BP: 160/72 (08 Nov 2017 05:55) (142/57 - 160/72)  BP(mean): --  ABP: --  ABP(mean): --  RR: 17 (08 Nov 2017 05:55) (16 - 17)  SpO2: 97% (08 Nov 2017 05:55) (91% - 97%)        CAPILLARY BLOOD GLUCOSE  156 (07 Nov 2017 21:08)      POCT Blood Glucose.: 149 mg/dL (08 Nov 2017 08:50)      GENERAL: NAD  HEENT:  AT/NC; EOMI, PERRLA, conjunctiva and sclera clear; hearing grossly intact  NECK: Supple, non tender  CHEST/LUNG: Crackles in B/L bases, otherwise CTA  HEART: Heart sounds diminshed, crescendo-decrescendo systolic murmur at LLSB/apex; Normal rate, regular rhythm; mild pitting edema in B/L LEs, no JVD  ABDOMEN: Soft, Nontender, Nondistended, No masses; BS present   : Voiding freely  MSK/EXTREMITIES: 5/5 strength; Grossly normal movement, tone, and ROM; Palpable peripheral pulses; No clubbing or cyanosis  PSYCH: AAOx3; Appropriate judgment and insight; Congruent mood and affect  NEUROLOGY: CNs 2-12 grossly intact; No focal deficits  SKIN: No rashes or lesions      HOSPITAL MEDICATIONS:  MEDICATIONS  (STANDING):  aspirin enteric coated 81 milliGRAM(s) Oral daily  dextrose 5%. 1000 milliLiter(s) (50 mL/Hr) IV Continuous <Continuous>  dextrose 50% Injectable 12.5 Gram(s) IV Push once  dextrose 50% Injectable 25 Gram(s) IV Push once  dextrose 50% Injectable 25 Gram(s) IV Push once  docusate sodium 100 milliGRAM(s) Oral two times a day  ertapenem  IVPB      fluticasone propionate   220 MICROgram(s) HFA Inhaler 2 Puff(s) Inhalation two times a day  heparin  Injectable 5000 Unit(s) SubCutaneous every 8 hours  influenza   Vaccine 0.5 milliLiter(s) IntraMuscular once  insulin glargine Injectable (LANTUS) 15 Unit(s) SubCutaneous at bedtime  insulin lispro (HumaLOG) corrective regimen sliding scale   SubCutaneous three times a day before meals  insulin lispro (HumaLOG) corrective regimen sliding scale   SubCutaneous at bedtime  levothyroxine 75 MICROGram(s) Oral daily  memantine 5 milliGRAM(s) Oral two times a day  senna 2 Tablet(s) Oral at bedtime  sodium chloride 0.9%. 1000 milliLiter(s) (50 mL/Hr) IV Continuous <Continuous>    MEDICATIONS  (PRN):  bisacodyl Suppository 10 milliGRAM(s) Rectal once PRN Constipation  dextrose Gel 1 Dose(s) Oral once PRN Blood Glucose LESS THAN 70 milliGRAM(s)/deciliter  glucagon  Injectable 1 milliGRAM(s) IntraMuscular once PRN Glucose LESS THAN 70 milligrams/deciliter  polyethylene glycol 3350 17 Gram(s) Oral daily PRN Constipation      LABS:                        9.4    7.95  )-----------( 284      ( 08 Nov 2017 07:30 )             29.5     Hgb Trend: 9.4<--, 10.1<--, 9.7<--, 9.5<--, 9.5<--  11-08    141  |  108<H>  |  38<H>  ----------------------------<  152<H>  5.1   |  21<L>  |  1.49<H>    Ca    8.1<L>      08 Nov 2017 07:30  Phos  5.6     11-07  Mg     2.3     11-07    TPro  6.2  /  Alb  3.3  /  TBili  < 0.2<L>  /  DBili  x   /  AST  12  /  ALT  11  /  AlkPhos  70  11-07    Creatinine Trend: 1.49<--, 1.79<--, 1.41<--, 1.29<--, 1.38<--, 1.80<--            MICROBIOLOGY:     RADIOLOGY:  [ ] Reviewed and interpreted by me    < from: Transthoracic Echocardiogram (11.05.17 @ 10:34) >    Patient name: Anna Benites  YOB: 1940   Age: 76 (F)   MR#: [id number]  Study Date: 11/5/2017  [facility]  Study quality: Technically good  Referring Physician: Alessandra Mayorga MD  Blood Pressure: 149/78 mmHg  Height: 165 cm  Weight: 61 kg  BSA: 1.7 m2  ------------------------------------------------------------------------  PROCEDURE: Transthoracic echocardiogram with 2-D, M-Mode  and complete spectral and color flow Doppler.  INDICATION: Syncope and collapse (R55)  ------------------------------------------------------------------------  DIMENSIONS:  Dimensions:     Normal Values:  LA:     4.2 cm    2.0 - 4.0 cm  Ao:     3.0 cm    2.0 - 3.8 cm  SEPTUM: 0.7 cm    0.6 - 1.2 cm  PWT:   0.7 cm    0.6 - 1.1 cm  LVIDd:  4.3 cm    3.0 - 5.6 cm  LVIDs:  3.2 cm    1.8 - 4.0 cm  Derived Variables:  LVMI: 53 g/m2  RWT: 0.32  Fractional short: 26 %  Ejection Fraction (Teicholtz): 51 %  ------------------------------------------------------------------------  OBSERVATIONS:  Mitral Valve: Normal mitral valve. Minimal mitral  regurgitation.  Aortic Root: Normal aortic root.  Aortic Valve: Aortic valve not well visualized; appears  calcified.  Left Atrium: Normal left atrium.  LA volume index = 30  cc/m2.  Left Ventricle: Endocardium not well visualized; grossly  normal left ventricular systolic function. Normal left  ventricular internal dimensions and wall thicknesses. Mild  diastolic dysfunction (Stage I).  Right Heart: Normal right atrium. Normal right ventricular  size and function. Normal tricuspid valve. Minimal  tricuspid regurgitation. Normal pulmonic valve.  Pericardium/PleuraSmall pericardial effusion.  ------------------------------------------------------------------------  CONCLUSIONS:  1. Aortic valve not well visualized; appears calcified.  2. Endocardium not well visualized; grossly normal left  ventricular systolic function.  3. Mild diastolic dysfunction (Stage I).  4. Normal right ventricular size and function.  *** No previous Echo exam.  ------------------------------------------------------------------------  Confirmed on  11/5/2017 - 14:35:52 by Ceci Ny MD  ------------------------------------------------------------------------    < end of copied text >      < from: CT Chest No Cont (11.03.17 @ 18:35) >    EXAM:  CT CHEST        PROCEDURE DATE:  Nov  3 2017         INTERPRETATION:  CLINICAL INFORMATION: Left lower lobe opacity from July.    COMPARISON: CT chest 7/20/2017    PROCEDURE:   CT of the Chest was performed without intravenous contrast.  Sagittal and coronal reformats were performed.    FINDINGS:    CHEST:     LUNGS AND LARGE AIRWAYS: Patent central airways.  4 mm right upper lobe   pulmonary nodule (series 3, image 84). Focal patchy opacities in the left   lingula and left lower lobe with nodular components are grossly unchanged   compared to prior exam. Right basilar linear atelectasis.  PLEURA: No pleural effusion. No pneumothorax.  VESSELS: Atherosclerotic calcification of the aorta.  HEART: Heart size is normal. Small pericardial effusion. Aortic valvular   calcifications.  MEDIASTINUM AND AKASH: No lymphadenopathy.  CHEST WALL AND LOWER NECK: 1.1 cm right thyroid nodule is unchanged.  VISUALIZED UPPER ABDOMEN: Calcified granuloma in the spleen.  BONES: Within normal limits.    IMPRESSION:     Focal patchy opacities in the left lingula and left lower lobe with   nodular components are grossly unchanged compared to prior exam.  Small pericardial effusion.                  RERE RAIN M.D., RADIOLOGY RESIDENT  This document has been electronically signed.  MARIANGEL MENDOZA M.D., ATTENDING RADIOLOGIST  This document has been electronically signed. Nov 4 2017 11:14AM                  < end of copied text >      < from: Xray Chest 1 View AP-PORTABLE IMMEDIATE (11.03.17 @ 14:49) >    EXAM:  RAD CHEST PORTABLE IMMEDIATE        PROCEDURE DATE:  Nov  3 2017         INTERPRETATION:  EXAMINATION: RAD CHEST PORTABLE IMMEDIATE    CLINICAL INDICATION: Syncope, leukocytosis    TECHNIQUE: Frontal radiograph of the chest was obtained.    COMPARISON: 7/23/2017.    FINDINGS:     Cardiac silhouette normal in size. Unchanged left lower lobe linear   opacity. Lungs otherwise clear.. No pleural effusion or pneumothorax.    IMPRESSION:   Unchanged left lower lobe linear opacity. Lungs otherwise clear..                   DARRIAN GONZALEZ M.D., ATTENDING RADIOLOGIST  This document has been electronically signed. Nov  3 2017  4:02PM                  < end of copied text >        PULMONARY FUNCTION TESTS:    EKG: CHIEF COMPLAINT: SOB    HPI:  75 y/o Mauritian speaking female (son at bedside helping translate) with a PMHx of dementia, HTN, HLD, DM, CKD, hypothyroidism, and anemia originally presented to ED complaining of dizziness. Pulmonary consult was called for persistent left sided pulmonary infiltrate seen on CT in July and Nov.     Pt states she has been SOB for several weeks. She endorses orthopnea (sxs occur when she lies flat, feeling of choking, resolve when she sits up). She reports that her presenting sx of dizziness is a feeling of imbalance and occurs when standing from sitting. She denies a sensation of the world spinning around her. She denies recurrence since admission. She also endorses LE swelling that has improved since taking a pill, not sure what. She also reports an episode of pna in March 2017 that was treated in [facility], unsure of laterality. She denies having been diagnosed with heart or pulmonary disease in the past. She states she has dysuria on admission that is now significantly improved. She currently denies CP, SOB, lightheadedness (other than as detailed), HA, vision changes, N/V.    PAST MEDICAL & SURGICAL HISTORY:  Dementia without behavioral disturbance, unspecified dementia type  Hypothyroidism  Anemia  CKD (chronic kidney disease)  DM (diabetes mellitus)  HLD (hyperlipidemia)  HTN (hypertension)  No significant past surgical history      FAMILY HISTORY:  No pertinent family history in first degree relatives      SOCIAL HISTORY:  Smoking: [ X ] Never Smoked [ ] Former Smoker (__ packs x ___ years) [ ] Current Smoker  (__ packs x ___ years)  Substance Use: [ X ] Never Used [ ] Used ____  EtOH Use: denies  Marital Status: [ ] Single [ ]  [ ]  [ ]   Sexual History:   Occupation:  Recent Travel:  Country of Birth: [facility]  Advance Directives:    Allergies    No Known Allergies    Intolerances        HOME MEDICATIONS:    REVIEW OF SYSTEMS:  Constitutional: [ ] fevers [ ] chills [ ] weight loss [ ] weight gain  HEENT: [ ] dry eyes [ ] eye irritation [ ] postnasal drip [ ] nasal congestion  CV: [ ] chest pain [ X ] orthopnea [ ] palpitations [ ] murmur  Resp: [ ] cough [ ] shortness of breath [ X ] dyspnea [ ] wheezing [ ] sputum [ ] hemoptysis  GI: [ ] nausea [ ] vomiting [ ] diarrhea [ ] constipation [ ] abd pain [ ] dysphagia   : [ X ] dysuria [ ] nocturia [ ] hematuria [ ] increased urinary frequency  Musculoskeletal: [ ] back pain [ ] myalgias [ ] arthralgias [ ] fracture  Skin: [ ] rash [ ] itch  Neurological: [ ] headache [ ] dizziness [ ] syncope [ ] weakness [ ] numbness  Psychiatric: [ ] anxiety [ ] depression  Endocrine: [ ] diabetes [ ] thyroid problem  Hematologic/Lymphatic: [ ] anemia [ ] bleeding problem  Allergic/Immunologic: [ ] itchy eyes [ ] nasal discharge [ ] hives [ ] angioedema  [X  ] All other systems negative  [ ] Unable to assess ROS because ________    OBJECTIVE:  ICU Vital Signs Last 24 Hrs  T(C): 36.6 (08 Nov 2017 05:55), Max: 36.9 (07 Nov 2017 21:08)  T(F): 97.9 (08 Nov 2017 05:55), Max: 98.5 (07 Nov 2017 21:08)  HR: 73 (08 Nov 2017 05:55) (73 - 79)  BP: 160/72 (08 Nov 2017 05:55) (142/57 - 160/72)  BP(mean): --  ABP: --  ABP(mean): --  RR: 17 (08 Nov 2017 05:55) (16 - 17)  SpO2: 97% (08 Nov 2017 05:55) (91% - 97%)        CAPILLARY BLOOD GLUCOSE  156 (07 Nov 2017 21:08)      POCT Blood Glucose.: 149 mg/dL (08 Nov 2017 08:50)      GENERAL: NAD  HEENT:  AT/NC; EOMI, PERRLA, conjunctiva and sclera clear; hearing grossly intact  NECK: Supple, non tender  CHEST/LUNG: Crackles in B/L bases, otherwise CTA  HEART: Heart sounds diminshed, crescendo-decrescendo systolic murmur at LLSB/apex; Normal rate, regular rhythm; mild pitting edema in B/L LEs, no JVD  ABDOMEN: Soft, Nontender, Nondistended, No masses; BS present   : Voiding freely  MSK/EXTREMITIES: 5/5 strength; Grossly normal movement, tone, and ROM; Palpable peripheral pulses; No clubbing or cyanosis  PSYCH: AAOx3; Appropriate judgment and insight; Congruent mood and affect  NEUROLOGY: CNs 2-12 grossly intact; No focal deficits  SKIN: No rashes or lesions      HOSPITAL MEDICATIONS:  MEDICATIONS  (STANDING):  aspirin enteric coated 81 milliGRAM(s) Oral daily  dextrose 5%. 1000 milliLiter(s) (50 mL/Hr) IV Continuous <Continuous>  dextrose 50% Injectable 12.5 Gram(s) IV Push once  dextrose 50% Injectable 25 Gram(s) IV Push once  dextrose 50% Injectable 25 Gram(s) IV Push once  docusate sodium 100 milliGRAM(s) Oral two times a day  ertapenem  IVPB      fluticasone propionate   220 MICROgram(s) HFA Inhaler 2 Puff(s) Inhalation two times a day  heparin  Injectable 5000 Unit(s) SubCutaneous every 8 hours  influenza   Vaccine 0.5 milliLiter(s) IntraMuscular once  insulin glargine Injectable (LANTUS) 15 Unit(s) SubCutaneous at bedtime  insulin lispro (HumaLOG) corrective regimen sliding scale   SubCutaneous three times a day before meals  insulin lispro (HumaLOG) corrective regimen sliding scale   SubCutaneous at bedtime  levothyroxine 75 MICROGram(s) Oral daily  memantine 5 milliGRAM(s) Oral two times a day  senna 2 Tablet(s) Oral at bedtime  sodium chloride 0.9%. 1000 milliLiter(s) (50 mL/Hr) IV Continuous <Continuous>    MEDICATIONS  (PRN):  bisacodyl Suppository 10 milliGRAM(s) Rectal once PRN Constipation  dextrose Gel 1 Dose(s) Oral once PRN Blood Glucose LESS THAN 70 milliGRAM(s)/deciliter  glucagon  Injectable 1 milliGRAM(s) IntraMuscular once PRN Glucose LESS THAN 70 milligrams/deciliter  polyethylene glycol 3350 17 Gram(s) Oral daily PRN Constipation      LABS:                        9.4    7.95  )-----------( 284      ( 08 Nov 2017 07:30 )             29.5     Hgb Trend: 9.4<--, 10.1<--, 9.7<--, 9.5<--, 9.5<--  11-08    141  |  108<H>  |  38<H>  ----------------------------<  152<H>  5.1   |  21<L>  |  1.49<H>    Ca    8.1<L>      08 Nov 2017 07:30  Phos  5.6     11-07  Mg     2.3     11-07    TPro  6.2  /  Alb  3.3  /  TBili  < 0.2<L>  /  DBili  x   /  AST  12  /  ALT  11  /  AlkPhos  70  11-07    Creatinine Trend: 1.49<--, 1.79<--, 1.41<--, 1.29<--, 1.38<--, 1.80<--            MICROBIOLOGY:     RADIOLOGY:  [ ] Reviewed and interpreted by me    < from: Transthoracic Echocardiogram (11.05.17 @ 10:34) >    Patient name: Anna Benites  YOB: 1940   Age: 76 (F)   MR#: [id number]  Study Date: 11/5/2017  [facility]  Study quality: Technically good  Referring Physician: Alessandra Mayorga MD  Blood Pressure: 149/78 mmHg  Height: 165 cm  Weight: 61 kg  BSA: 1.7 m2  ------------------------------------------------------------------------  PROCEDURE: Transthoracic echocardiogram with 2-D, M-Mode  and complete spectral and color flow Doppler.  INDICATION: Syncope and collapse (R55)  ------------------------------------------------------------------------  DIMENSIONS:  Dimensions:     Normal Values:  LA:     4.2 cm    2.0 - 4.0 cm  Ao:     3.0 cm    2.0 - 3.8 cm  SEPTUM: 0.7 cm    0.6 - 1.2 cm  PWT:   0.7 cm    0.6 - 1.1 cm  LVIDd:  4.3 cm    3.0 - 5.6 cm  LVIDs:  3.2 cm    1.8 - 4.0 cm  Derived Variables:  LVMI: 53 g/m2  RWT: 0.32  Fractional short: 26 %  Ejection Fraction (Teicholtz): 51 %  ------------------------------------------------------------------------  OBSERVATIONS:  Mitral Valve: Normal mitral valve. Minimal mitral  regurgitation.  Aortic Root: Normal aortic root.  Aortic Valve: Aortic valve not well visualized; appears  calcified.  Left Atrium: Normal left atrium.  LA volume index = 30  cc/m2.  Left Ventricle: Endocardium not well visualized; grossly  normal left ventricular systolic function. Normal left  ventricular internal dimensions and wall thicknesses. Mild  diastolic dysfunction (Stage I).  Right Heart: Normal right atrium. Normal right ventricular  size and function. Normal tricuspid valve. Minimal  tricuspid regurgitation. Normal pulmonic valve.  Pericardium/PleuraSmall pericardial effusion.  ------------------------------------------------------------------------  CONCLUSIONS:  1. Aortic valve not well visualized; appears calcified.  2. Endocardium not well visualized; grossly normal left  ventricular systolic function.  3. Mild diastolic dysfunction (Stage I).  4. Normal right ventricular size and function.  *** No previous Echo exam.  ------------------------------------------------------------------------  Confirmed on  11/5/2017 - 14:35:52 by Ceci Ny MD  ------------------------------------------------------------------------    < end of copied text >      < from: CT Chest No Cont (11.03.17 @ 18:35) >    EXAM:  CT CHEST        PROCEDURE DATE:  Nov  3 2017         INTERPRETATION:  CLINICAL INFORMATION: Left lower lobe opacity from July.    COMPARISON: CT chest 7/20/2017    PROCEDURE:   CT of the Chest was performed without intravenous contrast.  Sagittal and coronal reformats were performed.    FINDINGS:    CHEST:     LUNGS AND LARGE AIRWAYS: Patent central airways.  4 mm right upper lobe   pulmonary nodule (series 3, image 84). Focal patchy opacities in the left   lingula and left lower lobe with nodular components are grossly unchanged   compared to prior exam. Right basilar linear atelectasis.  PLEURA: No pleural effusion. No pneumothorax.  VESSELS: Atherosclerotic calcification of the aorta.  HEART: Heart size is normal. Small pericardial effusion. Aortic valvular   calcifications.  MEDIASTINUM AND AKASH: No lymphadenopathy.  CHEST WALL AND LOWER NECK: 1.1 cm right thyroid nodule is unchanged.  VISUALIZED UPPER ABDOMEN: Calcified granuloma in the spleen.  BONES: Within normal limits.    IMPRESSION:     Focal patchy opacities in the left lingula and left lower lobe with   nodular components are grossly unchanged compared to prior exam.  Small pericardial effusion.                  RERE RAIN M.D., RADIOLOGY RESIDENT  This document has been electronically signed.  MARIANGEL MENDOZA M.D., ATTENDING RADIOLOGIST  This document has been electronically signed. Nov 4 2017 11:14AM                  < end of copied text >      < from: Xray Chest 1 View AP-PORTABLE IMMEDIATE (11.03.17 @ 14:49) >    EXAM:  RAD CHEST PORTABLE IMMEDIATE        PROCEDURE DATE:  Nov  3 2017         INTERPRETATION:  EXAMINATION: RAD CHEST PORTABLE IMMEDIATE    CLINICAL INDICATION: Syncope, leukocytosis    TECHNIQUE: Frontal radiograph of the chest was obtained.    COMPARISON: 7/23/2017.    FINDINGS:     Cardiac silhouette normal in size. Unchanged left lower lobe linear   opacity. Lungs otherwise clear.. No pleural effusion or pneumothorax.    IMPRESSION:   Unchanged left lower lobe linear opacity. Lungs otherwise clear..                   DARRIAN GONZALEZ M.D., ATTENDING RADIOLOGIST  This document has been electronically signed. Nov  3 2017  4:02PM                  < end of copied text >        PULMONARY FUNCTION TESTS:    EKG:

## 2017-11-08 NOTE — PROGRESS NOTE ADULT - SUBJECTIVE AND OBJECTIVE BOX
Infectious Diseases progress note:    Subjective:  Urinary symptoms improved.  No burning, frequency, urgency.  No abd pain.  Has been constipated.  Several family members present at bedside.      ROS:  CONSTITUTIONAL:  No fever, chills, rigors  CARDIOVASCULAR:  No chest pain or palpitations  RESPIRATORY:   No SOB, cough, dyspnea on exertion.  No wheezing  GASTROINTESTINAL:  No abd pain, N/V, diarrhea/constipation  EXTREMITIES:  No swelling or joint pain  GENITOURINARY:  No burning on urination, increased frequency or urgency.  No flank pain  NEUROLOGIC:  No HA, visual disturbances  SKIN: No rashes    Allergies    No Known Allergies    Intolerances        ANTIBIOTICS/RELEVANT:  antimicrobials  ertapenem  IVPB        immunologic:  influenza   Vaccine 0.5 milliLiter(s) IntraMuscular once    OTHER:  aspirin enteric coated 81 milliGRAM(s) Oral daily  bisacodyl Suppository 10 milliGRAM(s) Rectal once PRN  bisacodyl Suppository 10 milliGRAM(s) Rectal daily PRN  dextrose 5%. 1000 milliLiter(s) IV Continuous <Continuous>  dextrose 50% Injectable 12.5 Gram(s) IV Push once  dextrose 50% Injectable 25 Gram(s) IV Push once  dextrose 50% Injectable 25 Gram(s) IV Push once  dextrose Gel 1 Dose(s) Oral once PRN  docusate sodium 100 milliGRAM(s) Oral two times a day  fluticasone propionate   220 MICROgram(s) HFA Inhaler 2 Puff(s) Inhalation two times a day  glucagon  Injectable 1 milliGRAM(s) IntraMuscular once PRN  heparin  Injectable 5000 Unit(s) SubCutaneous every 8 hours  insulin glargine Injectable (LANTUS) 15 Unit(s) SubCutaneous at bedtime  insulin lispro (HumaLOG) corrective regimen sliding scale   SubCutaneous three times a day before meals  insulin lispro (HumaLOG) corrective regimen sliding scale   SubCutaneous at bedtime  levothyroxine 75 MICROGram(s) Oral daily  memantine 5 milliGRAM(s) Oral two times a day  polyethylene glycol 3350 17 Gram(s) Oral daily PRN  senna 2 Tablet(s) Oral at bedtime  sodium chloride 0.9%. 1000 milliLiter(s) IV Continuous <Continuous>      Objective:  Vital Signs Last 24 Hrs  T(C): 36.9 (08 Nov 2017 21:11), Max: 36.9 (08 Nov 2017 21:11)  T(F): 98.5 (08 Nov 2017 21:11), Max: 98.5 (08 Nov 2017 21:11)  HR: 76 (08 Nov 2017 22:49) (73 - 80)  BP: 190/87 (08 Nov 2017 22:49) (160/72 - 197/88)  BP(mean): --  RR: 18 (08 Nov 2017 21:11) (17 - 18)  SpO2: 98% (08 Nov 2017 21:11) (97% - 98%)    PHYSICAL EXAM:  Constitutional:NAD  Eyes:ROMAIN, EOMI  Ear/Nose/Throat: no thrush, mucositis.  Moist mucous membranes	  Neck:no JVD, no lymphadenopathy, supple  Respiratory: CTA candi  Cardiovascular: S1S2 RRR, no murmurs  Gastrointestinal:soft, nontender,  nondistended (+) BS  Extremities:no e/e/c  Skin:  no rashes, open wounds or ulcerations        LABS:                        9.4    7.95  )-----------( 284      ( 08 Nov 2017 07:30 )             29.5     11-08    141  |  108<H>  |  38<H>  ----------------------------<  152<H>  5.1   |  21<L>  |  1.49<H>    Ca    8.1<L>      08 Nov 2017 07:30  Phos  5.6     11-07  Mg     2.3     11-07    TPro  6.2  /  Alb  3.3  /  TBili  < 0.2<L>  /  DBili  x   /  AST  12  /  ALT  11  /  AlkPhos  70  11-07          MICROBIOLOGY:    No new culture data      RADIOLOGY & ADDITIONAL STUDIES:    < from: CT Chest No Cont (11.03.17 @ 18:35) >  CHEST:     LUNGS AND LARGE AIRWAYS: Patent central airways.  4 mm right upper lobe   pulmonary nodule (series 3, image 84). Focal patchy opacities in the left   lingula and left lower lobe with nodular components are grossly unchanged   compared to prior exam. Right basilar linear atelectasis.  PLEURA: No pleural effusion. No pneumothorax.  VESSELS: Atherosclerotic calcification of the aorta.  HEART: Heart size is normal. Small pericardial effusion. Aortic valvular   calcifications.  MEDIASTINUM AND AKASH: No lymphadenopathy.  CHEST WALL AND LOWER NECK: 1.1 cm right thyroid nodule is unchanged.  VISUALIZED UPPER ABDOMEN: Calcified granuloma in the spleen.  BONES: Within normal limits.    IMPRESSION:     Focal patchy opacities in the left lingula and left lower lobe with   nodular components are grossly unchanged compared to prior exam.  Small pericardial effusion.    < end of copied text >

## 2017-11-08 NOTE — PROGRESS NOTE ADULT - PROBLEM SELECTOR PLAN 8
TSH 5.18  - continue home dose of levothyroxine 75mcg daily  - outpatient f/u Continue Lipitor  DASH diet

## 2017-11-08 NOTE — PROGRESS NOTE ADULT - ASSESSMENT
75 y/o [place]ian speaking female with a PMHx of dementia, HTN, HLD, DM, CKD, hypothyroidism, and anemia presents to ED complaining of dizziness, nausea and vomiting, found to be in NIXON. She was also found to have a persistent LLL opacity for which CT chest ordered and showed focal patchy opacities in the left lingula and left lower lobe with nodular components which were unchanged compared to prior exam. 75 y/o [place]ian speaking female with a PMHx of HTN, HLD, DM, CKD, hypothyroidism, and anemia presents to ED complaining of dizziness, nausea and vomiting, found to be in NIXON.        +orthostatics --> rebolus 2nd liter, hold bp meds, f/u orthostatics, f/u tele  other issues as noted above  persistent LLL opacity - d/w pt/family --> check noncontrast chest CT  Pt's UA showed (+) LE, WBC 5-10.  Urine cultures are growing >100K ESBL e.coli.   She was initially started on rocephin for UTI.  ID consult was called for further antibiotic management.      Recommend:    - Agree with ertapenem for esbl uti.  Anticipate short 3 day course for uncomplicated UTI (day #3/3)    - Pulmonary consult called by primary team to evaluate CT chest findings of persistent pulmonary infiltrates.  (Stable).  No fever, purulent sputum, cough, respiratory distress to suggest active infection at this time.  Pt to f/u as outpatient and f/u CT scan.      - Stable from ID standpoint.    Glo Rodriguez  [phone]

## 2017-11-08 NOTE — PROGRESS NOTE ADULT - SUBJECTIVE AND OBJECTIVE BOX
Patient is a 76y old  Female who presents with a chief complaint of Dizziness (03 Nov 2017 14:12)      SUBJECTIVE / OVERNIGHT EVENTS:    MEDICATIONS  (STANDING):  aspirin enteric coated 81 milliGRAM(s) Oral daily  dextrose 5%. 1000 milliLiter(s) (50 mL/Hr) IV Continuous <Continuous>  dextrose 50% Injectable 12.5 Gram(s) IV Push once  dextrose 50% Injectable 25 Gram(s) IV Push once  dextrose 50% Injectable 25 Gram(s) IV Push once  docusate sodium 100 milliGRAM(s) Oral two times a day  ertapenem  IVPB      fluticasone propionate   220 MICROgram(s) HFA Inhaler 2 Puff(s) Inhalation two times a day  heparin  Injectable 5000 Unit(s) SubCutaneous every 8 hours  influenza   Vaccine 0.5 milliLiter(s) IntraMuscular once  insulin glargine Injectable (LANTUS) 15 Unit(s) SubCutaneous at bedtime  insulin lispro (HumaLOG) corrective regimen sliding scale   SubCutaneous three times a day before meals  insulin lispro (HumaLOG) corrective regimen sliding scale   SubCutaneous at bedtime  levothyroxine 75 MICROGram(s) Oral daily  memantine 5 milliGRAM(s) Oral two times a day  senna 2 Tablet(s) Oral at bedtime  sodium chloride 0.9%. 1000 milliLiter(s) (50 mL/Hr) IV Continuous <Continuous>    MEDICATIONS  (PRN):  bisacodyl Suppository 10 milliGRAM(s) Rectal once PRN Constipation  dextrose Gel 1 Dose(s) Oral once PRN Blood Glucose LESS THAN 70 milliGRAM(s)/deciliter  glucagon  Injectable 1 milliGRAM(s) IntraMuscular once PRN Glucose LESS THAN 70 milligrams/deciliter  polyethylene glycol 3350 17 Gram(s) Oral daily PRN Constipation        CAPILLARY BLOOD GLUCOSE  156 (07 Nov 2017 21:08)      POCT Blood Glucose.: 149 mg/dL (08 Nov 2017 08:50)  POCT Blood Glucose.: 156 mg/dL (07 Nov 2017 21:05)  POCT Blood Glucose.: 152 mg/dL (07 Nov 2017 17:13)  POCT Blood Glucose.: 347 mg/dL (07 Nov 2017 12:20)    I&O's Summary      T(C): 36.6 (11-08-17 @ 05:55), Max: 36.9 (11-07-17 @ 21:08)  HR: 73 (11-08-17 @ 05:55) (73 - 79)  BP: 160/72 (11-08-17 @ 05:55) (142/57 - 160/72)  RR: 17 (11-08-17 @ 05:55) (16 - 17)  SpO2: 97% (11-08-17 @ 05:55) (91% - 97%)    PHYSICAL EXAM:  GENERAL: NAD, well-developed  HEAD:  Atraumatic, Normocephalic  EYES: EOMI, PERRLA, conjunctiva and sclera clear  NECK: Supple, No JVD  CHEST/LUNG: Clear to auscultation bilaterally; No wheeze  HEART: Regular rate and rhythm; No murmurs, rubs, or gallops  ABDOMEN: Soft, Nontender, Nondistended; Bowel sounds present  EXTREMITIES:  2+ Peripheral Pulses, No clubbing, cyanosis, or edema  PSYCH: AAOx3  NEUROLOGY: non-focal  SKIN: No rashes or lesions    LABS:                        9.4    7.95  )-----------( 284      ( 08 Nov 2017 07:30 )             29.5     11-08    141  |  108<H>  |  38<H>  ----------------------------<  152<H>  5.1   |  21<L>  |  1.49<H>    Ca    8.1<L>      08 Nov 2017 07:30  Phos  5.6     11-07  Mg     2.3     11-07    TPro  6.2  /  Alb  3.3  /  TBili  < 0.2<L>  /  DBili  x   /  AST  12  /  ALT  11  /  AlkPhos  70  11-07                RADIOLOGY & ADDITIONAL TESTS:    Imaging Personally Reviewed:    Consultant(s) Notes Reviewed:      Care Discussed with Consultants/Other Providers: Patient is a 76y old  Female who presents with a chief complaint of Dizziness (03 Nov 2017 14:12)      SUBJECTIVE / OVERNIGHT EVENTS: Pt in NAD no dizziness repeat orthostatics neg. tele NSR. +passing gas no BM as per pt/family at bedside    MEDICATIONS  (STANDING):  aspirin enteric coated 81 milliGRAM(s) Oral daily  dextrose 5%. 1000 milliLiter(s) (50 mL/Hr) IV Continuous <Continuous>  dextrose 50% Injectable 12.5 Gram(s) IV Push once  dextrose 50% Injectable 25 Gram(s) IV Push once  dextrose 50% Injectable 25 Gram(s) IV Push once  docusate sodium 100 milliGRAM(s) Oral two times a day  ertapenem  IVPB      fluticasone propionate   220 MICROgram(s) HFA Inhaler 2 Puff(s) Inhalation two times a day  heparin  Injectable 5000 Unit(s) SubCutaneous every 8 hours  influenza   Vaccine 0.5 milliLiter(s) IntraMuscular once  insulin glargine Injectable (LANTUS) 15 Unit(s) SubCutaneous at bedtime  insulin lispro (HumaLOG) corrective regimen sliding scale   SubCutaneous three times a day before meals  insulin lispro (HumaLOG) corrective regimen sliding scale   SubCutaneous at bedtime  levothyroxine 75 MICROGram(s) Oral daily  memantine 5 milliGRAM(s) Oral two times a day  senna 2 Tablet(s) Oral at bedtime  sodium chloride 0.9%. 1000 milliLiter(s) (50 mL/Hr) IV Continuous <Continuous>    MEDICATIONS  (PRN):  bisacodyl Suppository 10 milliGRAM(s) Rectal once PRN Constipation  dextrose Gel 1 Dose(s) Oral once PRN Blood Glucose LESS THAN 70 milliGRAM(s)/deciliter  glucagon  Injectable 1 milliGRAM(s) IntraMuscular once PRN Glucose LESS THAN 70 milligrams/deciliter  polyethylene glycol 3350 17 Gram(s) Oral daily PRN Constipation        CAPILLARY BLOOD GLUCOSE  156 (07 Nov 2017 21:08)      POCT Blood Glucose.: 149 mg/dL (08 Nov 2017 08:50)  POCT Blood Glucose.: 156 mg/dL (07 Nov 2017 21:05)  POCT Blood Glucose.: 152 mg/dL (07 Nov 2017 17:13)  POCT Blood Glucose.: 347 mg/dL (07 Nov 2017 12:20)    I&O's Summary      T(C): 36.6 (11-08-17 @ 05:55), Max: 36.9 (11-07-17 @ 21:08)  HR: 73 (11-08-17 @ 05:55) (73 - 79)  BP: 160/72 (11-08-17 @ 05:55) (142/57 - 160/72)  RR: 17 (11-08-17 @ 05:55) (16 - 17)  SpO2: 97% (11-08-17 @ 05:55) (91% - 97%)    PHYSICAL EXAM:  GENERAL: NAD, well-developed  HEAD:  Atraumatic, Normocephalic  EYES: EOMI, PERRLA, conjunctiva and sclera clear  NECK: Supple, No JVD  CHEST/LUNG: LT base crackles  HEART: Regular rate and rhythm; No murmurs, rubs, or gallops  ABDOMEN: Soft, Nontender, Nondistended; Bowel sounds present  EXTREMITIES:  2+ Peripheral Pulses, No clubbing, cyanosis, or edema  PSYCH: AAOx3  NEUROLOGY: non-focal  SKIN: No rashes or lesions    LABS:                        9.4    7.95  )-----------( 284      ( 08 Nov 2017 07:30 )             29.5     11-08    141  |  108<H>  |  38<H>  ----------------------------<  152<H>  5.1   |  21<L>  |  1.49<H>    Ca    8.1<L>      08 Nov 2017 07:30  Phos  5.6     11-07  Mg     2.3     11-07    TPro  6.2  /  Alb  3.3  /  TBili  < 0.2<L>  /  DBili  x   /  AST  12  /  ALT  11  /  AlkPhos  70  11-07                RADIOLOGY & ADDITIONAL TESTS:    Imaging Personally Reviewed:    Consultant(s) Notes Reviewed:      Care Discussed with Consultants/Other Providers: Patient is a 76y old  Female who presents with a chief complaint of Dizziness (03 Nov 2017 14:12)      SUBJECTIVE / OVERNIGHT EVENTS: Pt in NAD no dizziness repeat orthostatics neg. tele NSR. +passing gas no BM as per pt/family at bedside    MEDICATIONS  (STANDING):  aspirin enteric coated 81 milliGRAM(s) Oral daily  dextrose 5%. 1000 milliLiter(s) (50 mL/Hr) IV Continuous <Continuous>  dextrose 50% Injectable 12.5 Gram(s) IV Push once  dextrose 50% Injectable 25 Gram(s) IV Push once  dextrose 50% Injectable 25 Gram(s) IV Push once  docusate sodium 100 milliGRAM(s) Oral two times a day  ertapenem  IVPB      fluticasone propionate   220 MICROgram(s) HFA Inhaler 2 Puff(s) Inhalation two times a day  heparin  Injectable 5000 Unit(s) SubCutaneous every 8 hours  influenza   Vaccine 0.5 milliLiter(s) IntraMuscular once  insulin glargine Injectable (LANTUS) 15 Unit(s) SubCutaneous at bedtime  insulin lispro (HumaLOG) corrective regimen sliding scale   SubCutaneous three times a day before meals  insulin lispro (HumaLOG) corrective regimen sliding scale   SubCutaneous at bedtime  levothyroxine 75 MICROGram(s) Oral daily  memantine 5 milliGRAM(s) Oral two times a day  senna 2 Tablet(s) Oral at bedtime  sodium chloride 0.9%. 1000 milliLiter(s) (50 mL/Hr) IV Continuous <Continuous>    MEDICATIONS  (PRN):  bisacodyl Suppository 10 milliGRAM(s) Rectal once PRN Constipation  dextrose Gel 1 Dose(s) Oral once PRN Blood Glucose LESS THAN 70 milliGRAM(s)/deciliter  glucagon  Injectable 1 milliGRAM(s) IntraMuscular once PRN Glucose LESS THAN 70 milligrams/deciliter  polyethylene glycol 3350 17 Gram(s) Oral daily PRN Constipation        CAPILLARY BLOOD GLUCOSE  156 (07 Nov 2017 21:08)      POCT Blood Glucose.: 149 mg/dL (08 Nov 2017 08:50)  POCT Blood Glucose.: 156 mg/dL (07 Nov 2017 21:05)  POCT Blood Glucose.: 152 mg/dL (07 Nov 2017 17:13)  POCT Blood Glucose.: 347 mg/dL (07 Nov 2017 12:20)    I&O's Summary      T(C): 36.6 (11-08-17 @ 05:55), Max: 36.9 (11-07-17 @ 21:08)  HR: 73 (11-08-17 @ 05:55) (73 - 79)  BP: 160/72 (11-08-17 @ 05:55) (142/57 - 160/72)  RR: 17 (11-08-17 @ 05:55) (16 - 17)  SpO2: 97% (11-08-17 @ 05:55) (91% - 97%)    PHYSICAL EXAM:  GENERAL: NAD, well-developed  HEAD:  Atraumatic, Normocephalic  EYES: EOMI, PERRLA, conjunctiva and sclera clear  NECK: Supple, No JVD  CHEST/LUNG: faint LT base crackles  HEART: Regular rate and rhythm; No murmurs, rubs, or gallops  ABDOMEN: Soft, Nontender, Nondistended; Bowel sounds present  EXTREMITIES:  2+ Peripheral Pulses, No clubbing, cyanosis, or edema  PSYCH: AAOx1-2  NEUROLOGY: non-focal  SKIN: No rashes or lesions    LABS:                        9.4    7.95  )-----------( 284      ( 08 Nov 2017 07:30 )             29.5     11-08    141  |  108<H>  |  38<H>  ----------------------------<  152<H>  5.1   |  21<L>  |  1.49<H>    Ca    8.1<L>      08 Nov 2017 07:30  Phos  5.6     11-07  Mg     2.3     11-07    TPro  6.2  /  Alb  3.3  /  TBili  < 0.2<L>  /  DBili  x   /  AST  12  /  ALT  11  /  AlkPhos  70  11-07                RADIOLOGY & ADDITIONAL TESTS:    Imaging Personally Reviewed:    Consultant(s) Notes Reviewed:      Care Discussed with Consultants/Other Providers:

## 2017-11-08 NOTE — CONSULT NOTE ADULT - ATTENDING COMMENTS
Patient seen and examined with neurology team and above note reviewed and I agree with assessment and plan as outlined. Patient with transient dizziness with postural changes and in setting of dehydration and nausea and vomiting. Exam is currently nonfocal and CT head was negative for acute ischemia. We will proceed with supportive care and medical management and no further neurologic inpatient workup but should check orthostatics every 12 hours and encourage hydration.
77 [facility] for "dizz[facility]."  Pt found to have atelectasis in lingula and LLL.  Atelectasis has been relatively stable since JULY 2017. Pt apparently had pneumonia in March of this year. Pt  has no pulmonary symptoms. Recommend Following  lesions in lung with CT in ^-8 months. These lesions are likely atelectasis.

## 2017-11-08 NOTE — CONSULT NOTE ADULT - ASSESSMENT
77 y/o [place] speaking female with a PMHx of HTN, HLD, DM, CKD, hypothyroidism, and anemia presenting with s/s suggestive of aortic stenosis and possibly heart failure. Aortic valve appeared calcified on 11/05 TTE but valve otherwise not well visualized. 77 y/o Sinhala speaking female with a PMHx of HTN, HLD, DM, CKD, hypothyroidism, and anemia presenting with persistent pulmonary infiltrate seen on July and November CT and s/s suggestive of heart failure and aortic stenosis. 11/05 TTE shows Stage I diastolic dysfunction and calcified-appearing aortic valve, though AV otherwise not well visualized.    - O2 saturation WNL on room air -- supplemental O2 as needed  - Persistent pulmonary infiltrate possibly 2/2 March pna  - ? consider repeat TTE or NARAYAN to assess aortic valve ?    Will discuss with fellow and attending.      Andre Kruger MD  PGY-1 | Internal Medicine  [phone] / [phone] 77 y/o [place]vian speaking female with a PMHx of HTN, HLD, DM, CKD, hypothyroidism, and anemia presenting with persistent pulmonary infiltrate seen on July and November CT and s/s suggestive of heart failure and aortic stenosis. 11/05 TTE shows Stage I diastolic dysfunction and calcified-appearing aortic valve, though AV otherwise not well visualized.    - O2 saturation WNL on room air -- supplemental O2 as needed  - Persistent pulmonary infiltrate stable since July CT, unlikely to be source of pt's current sxs -- would have pt follow up outpatient with repeat CT in 6-8 months. If no changes at that time, pulmonary would not need to continue following. N  - ? consider repeat TTE or NARAYAN to assess aortic valve ?    Discussed with fellow and attending.      Andre Kruger MD  PGY-1 | Internal Medicine  [phone] / [phone] 75 y/o Lithuanian speaking female with a PMHx of HTN, HLD, DM, CKD, hypothyroidism, and anemia presenting with persistent pulmonary infiltrate seen on July and November CT and s/s suggestive of heart failure and aortic stenosis. 11/05 TTE shows Stage I diastolic dysfunction and calcified-appearing aortic valve, though AV otherwise not well visualized.    - Consider repeat TTE or NARAYAN to assess aortic valve  - O2 saturation WNL on room air -- supplemental O2 as needed  - Persistent pulmonary infiltrate stable since July CT, unlikely to be source of pt's current sxs.  - Would like pt to follow up outpatient with repeat CT in 6-8 months. If no changes at that time, pulmonary would not need to continue following.   - Pulmonary team will sign off. Please call with questions or concerns.    Discussed with fellow and attending.      Andre Kruger MD  PGY-1 | Internal Medicine  [phone] / [phone]

## 2017-11-08 NOTE — PROGRESS NOTE ADULT - PROBLEM SELECTOR PLAN 5
-monitor BP goal 's cont procardia  - Monitor BP serially  - DASH diet HbA1c 8.3  - increase lantus to 15 U FS <180 on current regimen  - Hold oral hypoglycemics  Diabetic diet

## 2017-11-08 NOTE — PROGRESS NOTE ADULT - PROBLEM SELECTOR PLAN 4
HbA1c 8.3  - increase lantus to 15 U   - Hold oral hypoglycemics  Diabetic diet gentle IVF.  Suspect pre-renal etiology. UA positive for pyuria, hematuria, proteinuria, and +leukocyte esterase, and few bacteria with urine cx growing E. coli >100K cfu/mL. Patient with known underlying CKD stage 3  - Cr elevated secondary to UTI and decreased PO intake will restart IVF and monitor Cr improved  - continue to hold Lasix and Lisinopril   - Avoid NSAIDs and nephrotoxins

## 2017-11-08 NOTE — PROGRESS NOTE ADULT - PROBLEM SELECTOR PLAN 7
Continue Lipitor  DASH diet Dizziness maybe 2/2 vasovagal episode/prerenal--now improved with IVF  - continue cardiac monitoring with telemetry  - Pt-home with PT  -+orthostatics--repeat orthostatic neg  - can consider meclizine if pt still symptomatic

## 2017-11-09 VITALS
SYSTOLIC BLOOD PRESSURE: 141 MMHG | DIASTOLIC BLOOD PRESSURE: 63 MMHG | HEART RATE: 78 BPM | OXYGEN SATURATION: 98 % | RESPIRATION RATE: 16 BRPM | TEMPERATURE: 97 F

## 2017-11-09 LAB
BUN SERPL-MCNC: 22 MG/DL — SIGNIFICANT CHANGE UP (ref 7–23)
CALCIUM SERPL-MCNC: 8.8 MG/DL — SIGNIFICANT CHANGE UP (ref 8.4–10.5)
CHLORIDE SERPL-SCNC: 106 MMOL/L — SIGNIFICANT CHANGE UP (ref 98–107)
CO2 SERPL-SCNC: 25 MMOL/L — SIGNIFICANT CHANGE UP (ref 22–31)
CREAT SERPL-MCNC: 1.35 MG/DL — HIGH (ref 0.5–1.3)
GLUCOSE SERPL-MCNC: 90 MG/DL — SIGNIFICANT CHANGE UP (ref 70–99)
HCT VFR BLD CALC: 29.8 % — LOW (ref 34.5–45)
HGB BLD-MCNC: 9.6 G/DL — LOW (ref 11.5–15.5)
MAGNESIUM SERPL-MCNC: 1.8 MG/DL — SIGNIFICANT CHANGE UP (ref 1.6–2.6)
MCHC RBC-ENTMCNC: 29.6 PG — SIGNIFICANT CHANGE UP (ref 27–34)
MCHC RBC-ENTMCNC: 32.2 % — SIGNIFICANT CHANGE UP (ref 32–36)
MCV RBC AUTO: 92 FL — SIGNIFICANT CHANGE UP (ref 80–100)
NRBC # FLD: 0 — SIGNIFICANT CHANGE UP
PLATELET # BLD AUTO: 310 K/UL — SIGNIFICANT CHANGE UP (ref 150–400)
PMV BLD: 11.3 FL — SIGNIFICANT CHANGE UP (ref 7–13)
POTASSIUM SERPL-MCNC: 3.9 MMOL/L — SIGNIFICANT CHANGE UP (ref 3.5–5.3)
POTASSIUM SERPL-SCNC: 3.9 MMOL/L — SIGNIFICANT CHANGE UP (ref 3.5–5.3)
RBC # BLD: 3.24 M/UL — LOW (ref 3.8–5.2)
RBC # FLD: 13.7 % — SIGNIFICANT CHANGE UP (ref 10.3–14.5)
SODIUM SERPL-SCNC: 142 MMOL/L — SIGNIFICANT CHANGE UP (ref 135–145)
WBC # BLD: 9.39 K/UL — SIGNIFICANT CHANGE UP (ref 3.8–10.5)
WBC # FLD AUTO: 9.39 K/UL — SIGNIFICANT CHANGE UP (ref 3.8–10.5)

## 2017-11-09 PROCEDURE — 99239 HOSP IP/OBS DSCHRG MGMT >30: CPT

## 2017-11-09 RX ORDER — FUROSEMIDE 40 MG
1 TABLET ORAL
Qty: 0 | Refills: 0 | COMMUNITY

## 2017-11-09 RX ORDER — LISINOPRIL 2.5 MG/1
1 TABLET ORAL
Qty: 0 | Refills: 0 | COMMUNITY

## 2017-11-09 RX ORDER — AMLODIPINE BESYLATE 2.5 MG/1
1 TABLET ORAL
Qty: 0 | Refills: 0 | COMMUNITY

## 2017-11-09 RX ORDER — DOCUSATE SODIUM 100 MG
1 CAPSULE ORAL
Qty: 0 | Refills: 0 | COMMUNITY
Start: 2017-11-09

## 2017-11-09 RX ORDER — INSULIN GLARGINE 100 [IU]/ML
16 INJECTION, SOLUTION SUBCUTANEOUS
Qty: 0 | Refills: 0 | COMMUNITY

## 2017-11-09 RX ORDER — MEMANTINE HYDROCHLORIDE 10 MG/1
1 TABLET ORAL
Qty: 0 | Refills: 0 | COMMUNITY
Start: 2017-11-09

## 2017-11-09 RX ORDER — LEVOTHYROXINE SODIUM 125 MCG
1 TABLET ORAL
Qty: 0 | Refills: 0 | COMMUNITY

## 2017-11-09 RX ORDER — NIFEDIPINE 30 MG
1 TABLET, EXTENDED RELEASE 24 HR ORAL
Qty: 0 | Refills: 0 | COMMUNITY
Start: 2017-11-09

## 2017-11-09 RX ORDER — FLUTICASONE PROPIONATE 220 MCG
2 AEROSOL WITH ADAPTER (GRAM) INHALATION
Qty: 0 | Refills: 0 | COMMUNITY
Start: 2017-11-09

## 2017-11-09 RX ORDER — LEVOTHYROXINE SODIUM 125 MCG
1 TABLET ORAL
Qty: 0 | Refills: 0 | COMMUNITY
Start: 2017-11-09

## 2017-11-09 RX ORDER — METFORMIN HYDROCHLORIDE 850 MG/1
1 TABLET ORAL
Qty: 0 | Refills: 0 | COMMUNITY

## 2017-11-09 RX ORDER — SENNA PLUS 8.6 MG/1
2 TABLET ORAL
Qty: 0 | Refills: 0 | COMMUNITY
Start: 2017-11-09

## 2017-11-09 RX ORDER — NIFEDIPINE 30 MG
1 TABLET, EXTENDED RELEASE 24 HR ORAL
Qty: 30 | Refills: 0 | OUTPATIENT
Start: 2017-11-09 | End: 2017-12-09

## 2017-11-09 RX ORDER — ASPIRIN/CALCIUM CARB/MAGNESIUM 324 MG
1 TABLET ORAL
Qty: 0 | Refills: 0 | COMMUNITY
Start: 2017-11-09

## 2017-11-09 RX ORDER — MEMANTINE HYDROCHLORIDE 10 MG/1
1 TABLET ORAL
Qty: 0 | Refills: 0 | COMMUNITY

## 2017-11-09 RX ORDER — FLUTICASONE PROPIONATE 220 MCG
2 AEROSOL WITH ADAPTER (GRAM) INHALATION
Qty: 0 | Refills: 0 | COMMUNITY

## 2017-11-09 RX ADMIN — Medication 2: at 13:12

## 2017-11-09 RX ADMIN — POLYETHYLENE GLYCOL 3350 17 GRAM(S): 17 POWDER, FOR SOLUTION ORAL at 05:35

## 2017-11-09 RX ADMIN — INFLUENZA VIRUS VACCINE 0.5 MILLILITER(S): 15; 15; 15; 15 SUSPENSION INTRAMUSCULAR at 14:56

## 2017-11-09 RX ADMIN — SODIUM CHLORIDE 50 MILLILITER(S): 9 INJECTION INTRAMUSCULAR; INTRAVENOUS; SUBCUTANEOUS at 05:34

## 2017-11-09 RX ADMIN — Medication 10 MILLIGRAM(S): at 05:35

## 2017-11-09 RX ADMIN — Medication 2: at 09:28

## 2017-11-09 RX ADMIN — HEPARIN SODIUM 5000 UNIT(S): 5000 INJECTION INTRAVENOUS; SUBCUTANEOUS at 05:35

## 2017-11-09 RX ADMIN — Medication 75 MICROGRAM(S): at 05:35

## 2017-11-09 RX ADMIN — MEMANTINE HYDROCHLORIDE 5 MILLIGRAM(S): 10 TABLET ORAL at 05:35

## 2017-11-09 RX ADMIN — Medication 81 MILLIGRAM(S): at 12:31

## 2017-11-09 RX ADMIN — HEPARIN SODIUM 5000 UNIT(S): 5000 INJECTION INTRAVENOUS; SUBCUTANEOUS at 12:31

## 2017-11-09 RX ADMIN — Medication 60 MILLIGRAM(S): at 05:35

## 2017-11-09 RX ADMIN — Medication 100 MILLIGRAM(S): at 05:35

## 2017-11-09 NOTE — PROGRESS NOTE ADULT - PROBLEM SELECTOR PROBLEM 2
E. coli UTI
NIXON (acute kidney injury)
NIXON (acute kidney injury)
Slow transit constipation

## 2017-11-09 NOTE — DISCHARGE NOTE ADULT - MEDICATION SUMMARY - MEDICATIONS TO CHANGE
I will SWITCH the dose or number of times a day I take the medications listed below when I get home from the hospital:    furosemide 40 mg oral tablet  -- 1 tab(s) by mouth once a day    lisinopril 20 mg oral tablet  -- 1 tab(s) by mouth once a day    metFORMIN 850 mg oral tablet  -- 1 tab(s) by mouth 2 times a day    amLODIPine 5 mg oral tablet  -- 1 tab(s) by mouth once a day    Lantus 100 units/mL subcutaneous solution  -- 16 unit(s) subcutaneous once a day

## 2017-11-09 NOTE — PROGRESS NOTE ADULT - PROBLEM SELECTOR PLAN 2
-AXR-non obstructive pattern moderate stool in colon   -add senna/colace/dulocolax  -stool count -AXR-non obstructive pattern moderate stool in colon   -add senna/colace/dulocolax  -s/p BM abdomen better

## 2017-11-09 NOTE — PROGRESS NOTE ADULT - SUBJECTIVE AND OBJECTIVE BOX
Patient is a 76y old  Female who presents with a chief complaint of Dizziness (03 Nov 2017 14:12)      SUBJECTIVE / OVERNIGHT EVENTS:    MEDICATIONS  (STANDING):  aspirin enteric coated 81 milliGRAM(s) Oral daily  dextrose 5%. 1000 milliLiter(s) (50 mL/Hr) IV Continuous <Continuous>  dextrose 50% Injectable 12.5 Gram(s) IV Push once  dextrose 50% Injectable 25 Gram(s) IV Push once  dextrose 50% Injectable 25 Gram(s) IV Push once  docusate sodium 100 milliGRAM(s) Oral two times a day  fluticasone propionate   220 MICROgram(s) HFA Inhaler 2 Puff(s) Inhalation two times a day  heparin  Injectable 5000 Unit(s) SubCutaneous every 8 hours  influenza   Vaccine 0.5 milliLiter(s) IntraMuscular once  insulin glargine Injectable (LANTUS) 15 Unit(s) SubCutaneous at bedtime  insulin lispro (HumaLOG) corrective regimen sliding scale   SubCutaneous three times a day before meals  insulin lispro (HumaLOG) corrective regimen sliding scale   SubCutaneous at bedtime  levothyroxine 75 MICROGram(s) Oral daily  memantine 5 milliGRAM(s) Oral two times a day  senna 2 Tablet(s) Oral at bedtime  sodium chloride 0.9%. 1000 milliLiter(s) (50 mL/Hr) IV Continuous <Continuous>    MEDICATIONS  (PRN):  bisacodyl Suppository 10 milliGRAM(s) Rectal once PRN Constipation  bisacodyl Suppository 10 milliGRAM(s) Rectal daily PRN Constipation  dextrose Gel 1 Dose(s) Oral once PRN Blood Glucose LESS THAN 70 milliGRAM(s)/deciliter  glucagon  Injectable 1 milliGRAM(s) IntraMuscular once PRN Glucose LESS THAN 70 milligrams/deciliter  polyethylene glycol 3350 17 Gram(s) Oral daily PRN Constipation        CAPILLARY BLOOD GLUCOSE      POCT Blood Glucose.: 204 mg/dL (09 Nov 2017 08:58)  POCT Blood Glucose.: 235 mg/dL (08 Nov 2017 21:38)  POCT Blood Glucose.: 258 mg/dL (08 Nov 2017 16:54)  POCT Blood Glucose.: 212 mg/dL (08 Nov 2017 13:02)    I&O's Summary      T(C): 36.8 (11-09-17 @ 05:33), Max: 36.9 (11-08-17 @ 21:11)  HR: 80 (11-09-17 @ 05:33) (76 - 80)  BP: 157/65 (11-09-17 @ 05:33) (157/65 - 197/88)  RR: 18 (11-09-17 @ 05:33) (18 - 18)  SpO2: 99% (11-09-17 @ 05:33) (98% - 99%)    PHYSICAL EXAM:  GENERAL: NAD, well-developed  HEAD:  Atraumatic, Normocephalic  EYES: EOMI, PERRLA, conjunctiva and sclera clear  NECK: Supple, No JVD  CHEST/LUNG: Clear to auscultation bilaterally; No wheeze  HEART: Regular rate and rhythm; No murmurs, rubs, or gallops  ABDOMEN: Soft, Nontender, Nondistended; Bowel sounds present  EXTREMITIES:  2+ Peripheral Pulses, No clubbing, cyanosis, or edema  PSYCH: AAOx3  NEUROLOGY: non-focal  SKIN: No rashes or lesions    LABS:                        9.6    9.39  )-----------( 310      ( 09 Nov 2017 07:45 )             29.8     11-09    142  |  106  |  22  ----------------------------<  90  3.9   |  25  |  1.35<H>    Ca    8.8      09 Nov 2017 06:45  Mg     1.8     11-09                  RADIOLOGY & ADDITIONAL TESTS:    Imaging Personally Reviewed:    Consultant(s) Notes Reviewed:      Care Discussed with Consultants/Other Providers: Patient is a 76y old  Female who presents with a chief complaint of Dizziness (03 Nov 2017 14:12)      SUBJECTIVE / OVERNIGHT EVENTS: s/p BM. afebrile no acute complaints denies CP/SOB/N/V     MEDICATIONS  (STANDING):  aspirin enteric coated 81 milliGRAM(s) Oral daily  dextrose 5%. 1000 milliLiter(s) (50 mL/Hr) IV Continuous <Continuous>  dextrose 50% Injectable 12.5 Gram(s) IV Push once  dextrose 50% Injectable 25 Gram(s) IV Push once  dextrose 50% Injectable 25 Gram(s) IV Push once  docusate sodium 100 milliGRAM(s) Oral two times a day  fluticasone propionate   220 MICROgram(s) HFA Inhaler 2 Puff(s) Inhalation two times a day  heparin  Injectable 5000 Unit(s) SubCutaneous every 8 hours  influenza   Vaccine 0.5 milliLiter(s) IntraMuscular once  insulin glargine Injectable (LANTUS) 15 Unit(s) SubCutaneous at bedtime  insulin lispro (HumaLOG) corrective regimen sliding scale   SubCutaneous three times a day before meals  insulin lispro (HumaLOG) corrective regimen sliding scale   SubCutaneous at bedtime  levothyroxine 75 MICROGram(s) Oral daily  memantine 5 milliGRAM(s) Oral two times a day  senna 2 Tablet(s) Oral at bedtime  sodium chloride 0.9%. 1000 milliLiter(s) (50 mL/Hr) IV Continuous <Continuous>    MEDICATIONS  (PRN):  bisacodyl Suppository 10 milliGRAM(s) Rectal once PRN Constipation  bisacodyl Suppository 10 milliGRAM(s) Rectal daily PRN Constipation  dextrose Gel 1 Dose(s) Oral once PRN Blood Glucose LESS THAN 70 milliGRAM(s)/deciliter  glucagon  Injectable 1 milliGRAM(s) IntraMuscular once PRN Glucose LESS THAN 70 milligrams/deciliter  polyethylene glycol 3350 17 Gram(s) Oral daily PRN Constipation        CAPILLARY BLOOD GLUCOSE      POCT Blood Glucose.: 204 mg/dL (09 Nov 2017 08:58)  POCT Blood Glucose.: 235 mg/dL (08 Nov 2017 21:38)  POCT Blood Glucose.: 258 mg/dL (08 Nov 2017 16:54)  POCT Blood Glucose.: 212 mg/dL (08 Nov 2017 13:02)    I&O's Summary      T(C): 36.8 (11-09-17 @ 05:33), Max: 36.9 (11-08-17 @ 21:11)  HR: 80 (11-09-17 @ 05:33) (76 - 80)  BP: 157/65 (11-09-17 @ 05:33) (157/65 - 197/88)  RR: 18 (11-09-17 @ 05:33) (18 - 18)  SpO2: 99% (11-09-17 @ 05:33) (98% - 99%)    PHYSICAL EXAM:  GENERAL: NAD, well-developed  HEAD:  Atraumatic, Normocephalic  EYES: EOMI, PERRLA, conjunctiva and sclera clear  NECK: Supple, No JVD  CHEST/LUNG: Clear to auscultation bilaterally; No wheeze  HEART: Regular rate and rhythm; No murmurs, rubs, or gallops  ABDOMEN: Soft, Nontender, Nondistended; Bowel sounds present  EXTREMITIES:  2+ Peripheral Pulses, No clubbing, cyanosis, or edema      LABS:                        9.6    9.39  )-----------( 310      ( 09 Nov 2017 07:45 )             29.8     11-09    142  |  106  |  22  ----------------------------<  90  3.9   |  25  |  1.35<H>    Ca    8.8      09 Nov 2017 06:45  Mg     1.8     11-09                  RADIOLOGY & ADDITIONAL TESTS:    Imaging Personally Reviewed:    Consultant(s) Notes Reviewed:      Care Discussed with Consultants/Other Providers:

## 2017-11-09 NOTE — PROGRESS NOTE ADULT - PROBLEM SELECTOR PLAN 4
gentle IVF.  Suspect pre-renal etiology. UA positive for pyuria, hematuria, proteinuria, and +leukocyte esterase, and few bacteria with urine cx growing E. coli >100K cfu/mL. Patient with known underlying CKD stage 3  - Cr elevated secondary to UTI and decreased PO intake will restart IVF and monitor Cr improved  - continue to hold Lasix and Lisinopril   - Avoid NSAIDs and nephrotoxins

## 2017-11-09 NOTE — PROGRESS NOTE ADULT - PROBLEM SELECTOR PLAN 5
HbA1c 8.3  - increase lantus to 15 U FS <180 on current regimen  - Hold oral hypoglycemics  Diabetic diet HbA1c 8.3  -  lantus to 15 U FS <180 on current regimen  - Hold oral hypoglycemics--d/c on glipizide and basaglar  Diabetic diet

## 2017-11-09 NOTE — DISCHARGE NOTE ADULT - CARE PLAN
Principal Discharge DX:	Orthostatic syncope  Goal:	resolved  Instructions for follow-up, activity and diet:	Continue current meds. Hydrate well. Follow up with medical doctor within 1 week.  Secondary Diagnosis:	E. coli UTI  Instructions for follow-up, activity and diet:	resolved. Follow with your medical doctor for repeat urine studies in 1 week.  Secondary Diagnosis:	HTN (hypertension)  Instructions for follow-up, activity and diet:	Follow up with PCP and/or cardiologist for ongoing medical management of your hypertension. Continue medications as prescribed. Low salt diet.   Lisinopril. Lasix, and Norvasc on hold. Please follow up with your medical doctor to resume these meds if indicated.  Secondary Diagnosis:	Hypothyroidism  Instructions for follow-up, activity and diet:	Continue current meds. Follow up with your medical doctor.  Secondary Diagnosis:	CKD (chronic kidney disease)  Instructions for follow-up, activity and diet:	Monitor your renal function carefully and prevent worsening renal function. Avoid NSAIDs and nephrotoxic agents (that may negatively affect your kidneys).  Secondary Diagnosis:	Abnormal chest CT  Instructions for follow-up, activity and diet:	Please follow with your medical doctor or pulmonary clinic for repeat chest imaging in 8 months to assess for persistent opacities seen on current chest CT.  Secondary Diagnosis:	DM (diabetes mellitus)  Instructions for follow-up, activity and diet:	Follow up with PCP and/or endocrinologist for ongoing medical management of your diabetes. Continue your medications as prescribed. Low carb diet. Please review blood sugars and medications with your medical doctor. You have been started on Glipizide, continue monitoring blood sugars and follow up with medical doctor.

## 2017-11-09 NOTE — PROGRESS NOTE ADULT - PROBLEM SELECTOR PLAN 7
Dizziness maybe 2/2 vasovagal episode/prerenal--now improved with IVF  - continue cardiac monitoring with telemetry  - Pt-home with PT  -+orthostatics--repeat orthostatic neg  - can consider meclizine if pt still symptomatic

## 2017-11-09 NOTE — DISCHARGE NOTE ADULT - CARE PROVIDER_API CALL
Your medical doctor or [facility] medical clinic in 1 week,   Phone: [phone]  Fax: (   )    -    Pulmonary Clinic,   Please call for appointmwent in 6-8 months for repeat Chest ct  Phone: [phone]  Fax: (   )    -

## 2017-11-09 NOTE — DISCHARGE NOTE ADULT - PROVIDER TOKENS
FREE:[LAST:[Your medical doctor or [facility] medical clinic in 1 week],PHONE:[phone]],FAX:[(   )    -]],FREE:[LAST:[Pulmonary Clinic],PHONE:[phone]],FAX:[(   )    -],ADDRESS:[Please call for appointmwent in 6-8 months for repeat Chest ct]]

## 2017-11-09 NOTE — PROGRESS NOTE ADULT - ASSESSMENT
Patient is a 77 yo Ukrainian speaking woman from [facility] with a PMHx of dementia, HTN, HLD, DM, CKD, hypothyroidism, and chronic anemia presenting with dizziness, nausea and vomiting, found to be in NIXON and relative orthostatic hypotension, now with confirmed symptomatic ESBL E.coli UTI.

## 2017-11-09 NOTE — DISCHARGE NOTE ADULT - MEDICATION SUMMARY - MEDICATIONS TO TAKE
I will START or STAY ON the medications listed below when I get home from the hospital:    aspirin 81 mg oral delayed release tablet  -- 1 tab(s) by mouth once a day  -- Indication: For Heart    glipiZIDE 2.5 mg oral tablet, extended release  -- 1 tab(s) by mouth once a day  -- Indication: For DM    Basaglar KwikPen 100 units/mL subcutaneous solution  -- 10 unit(s) subcutaneous once a day  -- Indication: For DM (diabetes mellitus)    NIFEdipine 90 mg oral tablet, extended release  -- 1 tab(s) by mouth once a day  -- Indication: For HTN (hypertension)    docusate sodium 100 mg oral capsule  -- 1 cap(s) by mouth 2 times a day  -- Indication: For constipation    senna oral tablet  -- 2 tab(s) by mouth once a day (at bedtime)  -- Indication: For constipation    memantine 5 mg oral tablet  -- 1 tab(s) by mouth 2 times a day  -- Indication: For Dementia without behavioral disturbance, unspecified dementia type    fluticasone CFC free 220 mcg/inh inhalation aerosol  -- 2 puff(s) inhaled 2 times a day  -- Indication: For Nasal congestion    levothyroxine 75 mcg (0.075 mg) oral tablet  -- 1 tab(s) by mouth once a day  -- Indication: For Hypothyroidism

## 2017-11-09 NOTE — PROGRESS NOTE ADULT - PROBLEM SELECTOR PLAN 6
-monitor BP goal 's cont procardia  - Monitor BP serially  - DASH diet -monitor BP goal 's increase to 90  procardia   - Monitor BP serially  - DASH diet

## 2017-11-09 NOTE — DISCHARGE NOTE ADULT - PATIENT PORTAL LINK FT
“You can access the Follow[facility] Patient Portal, offered by [facility], by registering with the following website: [email or web address]”

## 2017-11-09 NOTE — PROGRESS NOTE ADULT - PROBLEM SELECTOR PLAN 1
-persistent infiltrate on CT Chest post treatment fo PNA in 7/2017 Pulmonary consult -persistent infiltrate on CT Chest post treatment fo PNA in 7/2017 Pulmonary consult-likely chronic atelectasis as per pulm outpt f/u

## 2017-11-09 NOTE — DISCHARGE NOTE ADULT - PLAN OF CARE
resolved Continue current meds. Hydrate well. Follow up with medical doctor within 1 week. resolved. Follow with your medical doctor for repeat urine studies in 1 week. Follow up with PCP and/or cardiologist for ongoing medical management of your hypertension. Continue medications as prescribed. Low salt diet.   Lisinopril. Lasix, and Norvasc on hold. Please follow up with your medical doctor to resume these meds if indicated. Continue current meds. Follow up with your medical doctor. Monitor your renal function carefully and prevent worsening renal function. Avoid NSAIDs and nephrotoxic agents (that may negatively affect your kidneys). Please follow with your medical doctor or pulmonary clinic for repeat chest imaging in 8 months to assess for persistent opacities seen on current chest CT. Follow up with PCP and/or endocrinologist for ongoing medical management of your diabetes. Continue your medications as prescribed. Low carb diet. Please review blood sugars and medications with your medical doctor. You have been started on Glipizide, continue monitoring blood sugars and follow up with medical doctor.

## 2017-11-09 NOTE — DISCHARGE NOTE ADULT - HOSPITAL COURSE
77 y/o Malay speaking female with a PMHx of HTN, HLD, DM, CKD, hypothyroidism, and anemia presents to ED complaining of dizziness, nausea and vomiting, found to be in NIXON.     Pt was found to be orthostatic, resolved with IVF. NIXON likely prerenal, improved with IVF, holding antihypertensives and diuretics. UA  also done revealing ESBL UTI--> treated with 3 day course of Ertapenem.    CT head: No acute intracranial hemorrhage, large cortical infarct or mass effect, given the extent of artifact. No significant interval change since 7/20/2017    Tele without events, MI ruled out    Chest imagine revealed -CT chest: Focal patchy opacities in the left lingula and left lower lobe with   nodular components are grossly unchanged compared to prior exam.   Small pericardial effusion.. Pulm consulted for persistent opacities, recc repeat CT in 8 months.   Current sx likely not due to CT chest findings.  Echo-11/5 Echo: EF 51% 1. Aortic valve not well visualized; appears calcified. 2. Endocardium not well visualized; grossly normal left ventricular systolic function.3. Mild diastolic dysfunction (Stage I). 4. Normal right ventricular size and function.    pt with clinical improvement, will dc home.

## 2017-11-09 NOTE — DISCHARGE NOTE ADULT - SECONDARY DIAGNOSIS.
E. coli UTI HTN (hypertension) Hypothyroidism CKD (chronic kidney disease) Abnormal chest CT DM (diabetes mellitus)

## 2018-01-22 NOTE — PROGRESS NOTE ADULT - ATTENDING COMMENTS
Patient's daughter called re: patient and  took LifeVest off the other night and they do not have any recollection of this. She also reports patient is having increase swelling in hands, however they are unable to weigh her because she cannot stand right now. She states that the patient has an appointment later in the week, but she feels she needs to be seen sooner. Patient to be seen in office tomorrow at 2:00.    Discharged time 32min

## 2018-07-16 PROBLEM — J18.9 PNEUMONIA, UNSPECIFIED ORGANISM: Chronic | Status: INACTIVE | Noted: 2017-07-20 | Resolved: 2017-11-03

## 2018-07-16 PROBLEM — E03.9 HYPOTHYROIDISM, UNSPECIFIED: Chronic | Status: INACTIVE | Noted: 2017-07-20 | Resolved: 2017-11-03

## 2020-05-26 NOTE — ED SUB INTERN NOTE - SKIN, MLM
Skin normal color for race, warm, dry and intact. No evidence of rash. CONST: Well appearing in NAD  EYES:  Sclera and conjunctiva clear.  CARD: Normal S1 S2; Normal rate and rhythm  RESP: Equal BS B/L, No wheezes, rhonchi or rales. No distress  GI: Soft, non-tender, non-distended.  MS: + TTP chest wall and w/ ROM L shoulder, no rashes, Normal ROM in all extremities. No edema of lower extremities, no calf pain, radial pulses 2+ bilaterally  SKIN: Warm, dry, no acute rashes. Good turgor  NEURO: A&Ox3, No focal deficits. Strength 5/5 with no sensory deficits.

## 2022-04-21 NOTE — H&P ADULT - LYMPH NODES
No lymphadedenopathy Opioid Pregnancy And Lactation Text: These medications can lead to premature delivery and should be avoided during pregnancy. These medications are also present in breast milk in small amounts.

## 2022-07-31 NOTE — H&P ADULT - PROBLEM SELECTOR PLAN 4
Problem: At Risk for Falls  Goal: # Patient does not fall  Outcome: Outcome Not Met, Continue to Monitor  Goal: # Takes action to control fall-related risks  Outcome: Outcome Not Met, Continue to Monitor  Goal: # Verbalizes understanding of fall risk/precautions  Description: Document education using the patient education activity  Outcome: Outcome Not Met, Continue to Monitor     Problem: Diabetes  Goal: Glycemic balance achieved/maintained  Description: Goal is to maintain blood sugar within range with no episodes of hypoglycemia  Outcome: Outcome Not Met, Continue to Monitor  Goal: Verbalizes/demonstrates understanding of Diabetes  Description: Document on Patient Education Activity  Outcome: Outcome Not Met, Continue to Monitor  Goal: Demonstrates ability to self-administer insulin  Description: Document on Patient Education Activity  Outcome: Outcome Not Met, Continue to Monitor     Problem: VTE, Risk for  Goal: # No s/s of VTE  Outcome: Outcome Not Met, Continue to Monitor  Goal: # Verbalizes understanding of VTE risk factors and prevention  Description: Document education using the patient education activity.   Outcome: Outcome Not Met, Continue to Monitor  Goal: Demonstrates ability to administer injectable anticoagulants if ordered for d/c  Description: Document education using the patient education activity.  Outcome: Outcome Not Met, Continue to Monitor      Continue Lipitor  Check FLP  DASH diet

## 2023-03-31 NOTE — PROGRESS NOTE ADULT - ASSESSMENT
"Please see \"Imaging\" tab under \"Chart Review\" for details of today's visit.    Gregoria Marie    " Patient is a 77 yo Bulgarian speaking woman from [facility] with a PMHx of dementia, HTN, HLD, DM, CKD, hypothyroidism, and chronic anemia presenting with dizziness, nausea and vomiting, found to be in NIXON and relative orthostatic hypotension, now with confirmed symptomatic ESBL E.coli UTI.

## 2023-09-07 NOTE — PATIENT PROFILE ADULT. - VISION (WITH CORRECTIVE LENSES IF THE PATIENT USUALLY WEARS THEM):
patient taken to [facility] Viral Guidry RN  09/07/23 017
Partially impaired: cannot see medication labels or newsprint, but can see obstacles in path, and the surrounding layout; can count fingers at arm's length